# Patient Record
Sex: FEMALE | Race: BLACK OR AFRICAN AMERICAN | NOT HISPANIC OR LATINO | Employment: UNEMPLOYED | URBAN - METROPOLITAN AREA
[De-identification: names, ages, dates, MRNs, and addresses within clinical notes are randomized per-mention and may not be internally consistent; named-entity substitution may affect disease eponyms.]

---

## 2018-06-23 ENCOUNTER — HOSPITAL ENCOUNTER (EMERGENCY)
Facility: HOSPITAL | Age: 33
Discharge: HOME | End: 2018-06-23
Payer: MEDICAID

## 2018-06-23 VITALS
OXYGEN SATURATION: 99 % | BODY MASS INDEX: 31.29 KG/M2 | RESPIRATION RATE: 18 BRPM | WEIGHT: 176.56 LBS | HEART RATE: 77 BPM | DIASTOLIC BLOOD PRESSURE: 87 MMHG | HEIGHT: 63 IN | TEMPERATURE: 99 F | SYSTOLIC BLOOD PRESSURE: 120 MMHG

## 2018-06-23 DIAGNOSIS — N76.4 ABSCESS OF LABIA MAJORA: Primary | ICD-10-CM

## 2018-06-23 DIAGNOSIS — N94.89 LABIAL PAIN: ICD-10-CM

## 2018-06-23 PROCEDURE — 25000003 PHARM REV CODE 250: Performed by: REGISTERED NURSE

## 2018-06-23 PROCEDURE — 56405 I&D VULVA/PERINEAL ABSCESS: CPT

## 2018-06-23 PROCEDURE — 99284 EMERGENCY DEPT VISIT MOD MDM: CPT | Mod: 25

## 2018-06-23 RX ORDER — TRAMADOL HYDROCHLORIDE 50 MG/1
50 TABLET ORAL EVERY 6 HOURS PRN
Qty: 12 TABLET | Refills: 0 | Status: SHIPPED | OUTPATIENT
Start: 2018-06-23 | End: 2018-07-03

## 2018-06-23 RX ORDER — LIDOCAINE HYDROCHLORIDE 10 MG/ML
10 INJECTION, SOLUTION EPIDURAL; INFILTRATION; INTRACAUDAL; PERINEURAL
Status: COMPLETED | OUTPATIENT
Start: 2018-06-23 | End: 2018-06-23

## 2018-06-23 RX ORDER — CLINDAMYCIN HYDROCHLORIDE 300 MG/1
300 CAPSULE ORAL EVERY 6 HOURS
Qty: 28 CAPSULE | Refills: 0 | Status: SHIPPED | OUTPATIENT
Start: 2018-06-23 | End: 2018-06-30

## 2018-06-23 RX ADMIN — LIDOCAINE HYDROCHLORIDE 100 MG: 10 INJECTION, SOLUTION EPIDURAL; INFILTRATION; INTRACAUDAL; PERINEURAL at 08:06

## 2018-06-24 NOTE — ED PROVIDER NOTES
SCRIBE #1 NOTE: I, Manuel Bose, am scribing for, and in the presence of, Cory Grady Jr, NP. I have scribed the entire note.      History      Chief Complaint   Patient presents with    Abscess     vaginal abscess reported       Review of patient's allergies indicates:  No Known Allergies     HPI   HPI    6/23/2018, 8:08 PM   History obtained from the patient      History of Present Illness: Izzy Horner is a 32 y.o. female patient who presents to the Emergency Department for an evaluation of an abscess to her labia which onset gradually x2 days ago. Pt reports she has had abscesses in the past after being waxed. Symptoms are constant and moderate in severity. Exacerbated by palpation and relieved by nothing. No associated sxs. Patient denies any fever, chills, redness, swelling, warmth, drainage, and all other sxs at this time. Pt denies tx PTA. No further complaints or concerns at this time.         Arrival mode: Personal vehicle    PCP: Vincent Esquivel MD       Past Medical History:  Past Medical History:   Diagnosis Date    Encounter for blood transfusion        Past Surgical History:  Past surgical history reviewed not relevant      Family History:  Family history reviewed not relevant      Social History:  Social History     Social History Main Topics    Smoking status: Never Smoker    Smokeless tobacco: Never Used    Alcohol use No    Drug use: No    Sexual activity: Yes     Partners: Male     Birth control/ protection: None       ROS   Review of Systems   Constitutional: Negative for chills and fever.   HENT: Negative for congestion and sore throat.    Respiratory: Negative for cough and shortness of breath.    Cardiovascular: Negative for chest pain.   Gastrointestinal: Negative for nausea and vomiting.   Genitourinary: Negative for dysuria and hematuria.   Musculoskeletal: Negative for back pain.   Skin: Negative for pallor and rash.        (+) Abscess  (-) Warmth  (-)  Swelling  (-) Redness  (-) Drainage   Neurological: Negative for dizziness, weakness and headaches.   Hematological: Does not bruise/bleed easily.     Physical Exam      Initial Vitals [06/23/18 2002]   BP Pulse Resp Temp SpO2   129/86 66 18 98.3 °F (36.8 °C) 100 %      MAP       --          Physical Exam  Nursing Notes and Vital Signs Reviewed.  Constitutional: Patient is in no acute distress. Well-developed and well-nourished.  Head: Atraumatic. Normocephalic.  Eyes: PERRL. EOM intact. Conjunctivae are not pale. No scleral icterus.  ENT: Mucous membranes are moist. Oropharynx is clear and symmetric.    Neck: Supple. Full ROM. No lymphadenopathy.  Cardiovascular: Regular rate. Regular rhythm. No murmurs, rubs, or gallops. Distal pulses are 2+ and symmetric.  Pulmonary/Chest: No respiratory distress. Clear to auscultation bilaterally. No wheezing or rales.  Abdominal: Soft and non-distended.  There is no tenderness.   Pelvic: A female chaperone was present for this examination. Nl external inspection. x2cm area of erythema and fluctuance noted to the right labia majora, tender to touch  Musculoskeletal: Moves all extremities. No obvious deformities.  Skin: Warm and dry.  Neurological:  Alert, awake, and appropriate.  Normal speech.  No acute focal neurological deficits are appreciated.  Psychiatric: Normal affect. Good eye contact. Appropriate in content.    ED Course    I & D - Incision and Drainage  Date/Time: 6/23/2018 8:31 PM  Performed by: CARISSA PAZ JR  Authorized by: CARISSA PAZ JR   Type: abscess  Body area: anogenital (Right lateral aspect of labia majora)  Anesthesia: local infiltration    Anesthesia:  Local Anesthetic: lidocaine 1% with epinephrine  Patient sedated: no  Scalpel size: 11  Incision type: single straight  Complexity: simple  Drainage: pus  Drainage amount: moderate  Wound treatment: incision,  wound left open,  deloculation,  expression of material and  wound  "packed  Packing material: 1/4 in gauze  Complications: No  Patient tolerance: Patient tolerated the procedure well with no immediate complications  Comments: A maxi pad was placed over site as a dressing.         ED Vital Signs:  Vitals:    06/23/18 2002   BP: 129/86   Pulse: 66   Resp: 18   Temp: 98.3 °F (36.8 °C)   TempSrc: Oral   SpO2: 100%   Weight: 80.1 kg (176 lb 9.4 oz)   Height: 5' 3" (1.6 m)            The Emergency Provider reviewed the vital signs and test results, which are outlined above.    ED Discussion     8:40 PM: Reassessed pt at this time. Pt is awake, alert, and in NAD. Pt states her condition has improved at this time. Discussed with pt all pertinent ED information. Discussed pt dx and plan of tx. Gave pt all f/u and return to the ED instructions. All questions and concerns were addressed at this time. Pt expresses understanding of information and instructions, and is comfortable with plan to discharge. Pt is stable for discharge.    I discussed with patient and/or family/caretaker that evaluation in the ED does not suggest any emergent or life threatening medical conditions requiring immediate intervention beyond what was provided in the ED, and I believe patient is safe for discharge.  Regardless, an unremarkable evaluation in the ED does not preclude the development or presence of a serious of life threatening condition. As such, patient was instructed to return immediately for any worsening or change in current symptoms.    I discussed wound care precautions with patient and/or family/caretaker; specifically that all wounds have risk of infection despite efforts to cleanse and debride the wound; and there is a risk of an occult foreign body (and thus increased risk of infection) despite a negative examination.  I discussed with patient need to return for any signs of infection, specifically redness, increased pain, fever, drainage of pus, or any concern, immediately.      ED " Medication(s):  Medications   lidocaine (PF) 10 mg/ml (1%) injection 100 mg (not administered)       New Prescriptions    CLINDAMYCIN (CLEOCIN) 300 MG CAPSULE    Take 1 capsule (300 mg total) by mouth every 6 (six) hours. for 7 days    TRAMADOL (ULTRAM) 50 MG TABLET    Take 1 tablet (50 mg total) by mouth every 6 (six) hours as needed.       Follow-up Information     Vincent Esquivel MD In 1 week.    Specialty:  Family Medicine  Why:  As needed  Contact information:  [address] [facility] [id number] [phone]             [facility] - .    Specialty:  Emergency Medicine  Why:  If symptoms worsen  Contact information:  [address] [facility] [address] [phone]                   Medical Decision Making              Scribe Attestation:   Scribe #1: I performed the above scribed service and the documentation accurately describes the services I performed. I attest to the accuracy of the note.    Attending:   Physician Attestation Statement for Scribe #1: I, Cory Grady Jr, NP, personally performed the services described in this documentation, as scribed by Manuel Bose, in my presence, and it is both accurate and complete.          Clinical Impression       ICD-10-CM ICD-9-CM   1. Abscess of labia majora N76.4 616.4   2. Labial pain N94.89 625.9       Disposition:   Disposition: Discharged  Condition: Stable         Cory Grady Jr., [facility]  06/23/18 7359

## 2018-10-22 ENCOUNTER — HOSPITAL ENCOUNTER (EMERGENCY)
Facility: HOSPITAL | Age: 33
Discharge: HOME | End: 2018-10-22
Attending: EMERGENCY MEDICINE
Payer: MEDICAID

## 2018-10-22 VITALS
RESPIRATION RATE: 18 BRPM | TEMPERATURE: 98 F | SYSTOLIC BLOOD PRESSURE: 130 MMHG | HEART RATE: 72 BPM | WEIGHT: 177.38 LBS | OXYGEN SATURATION: 99 % | HEIGHT: 63 IN | BODY MASS INDEX: 31.43 KG/M2 | DIASTOLIC BLOOD PRESSURE: 78 MMHG

## 2018-10-22 DIAGNOSIS — L03.115 CELLULITIS OF RIGHT LOWER EXTREMITY: Primary | ICD-10-CM

## 2018-10-22 PROCEDURE — 99284 EMERGENCY DEPT VISIT MOD MDM: CPT | Mod: 25

## 2018-10-22 PROCEDURE — 25000003 PHARM REV CODE 250: Performed by: EMERGENCY MEDICINE

## 2018-10-22 PROCEDURE — 96372 THER/PROPH/DIAG INJ SC/IM: CPT

## 2018-10-22 PROCEDURE — S0077 INJECTION, CLINDAMYCIN PHOSP: HCPCS | Performed by: EMERGENCY MEDICINE

## 2018-10-22 RX ORDER — SULFAMETHOXAZOLE AND TRIMETHOPRIM 800; 160 MG/1; MG/1
2 TABLET ORAL 2 TIMES DAILY
Qty: 40 TABLET | Refills: 0 | Status: SHIPPED | OUTPATIENT
Start: 2018-10-22 | End: 2018-11-01

## 2018-10-22 RX ORDER — CLINDAMYCIN PHOSPHATE 150 MG/ML
600 INJECTION, SOLUTION INTRAVENOUS
Status: COMPLETED | OUTPATIENT
Start: 2018-10-22 | End: 2018-10-22

## 2018-10-22 RX ORDER — CLINDAMYCIN HYDROCHLORIDE 150 MG/1
450 CAPSULE ORAL EVERY 8 HOURS
Qty: 90 CAPSULE | Refills: 0 | Status: SHIPPED | OUTPATIENT
Start: 2018-10-22 | End: 2018-11-01

## 2018-10-22 RX ADMIN — CLINDAMYCIN PHOSPHATE 600 MG: 150 INJECTION, SOLUTION INTRAMUSCULAR; INTRAVENOUS at 09:10

## 2018-10-23 NOTE — ED PROVIDER NOTES
SCRIBE #1 NOTE: I, Grace Keira, am scribing for, and in the presence of, Thor Flores MD. I have scribed the entire note.      History      Chief Complaint   Patient presents with    Insect Bite     insect bite to R ankle with redness and swelling; pt states she was seen at after hours and taking antibiotics with no change for 2 days       Review of patient's allergies indicates:  No Known Allergies     HPI   HPI    10/22/2018, 8:41 PM   History obtained from the patient      History of Present Illness: Izzy Horner is a 33 y.o. female patient who presents to the Emergency Department for erythema and swelling to the R ankle which onset gradually after an insect bite x1 week ago. Pt reports the swelling and erythema began worsening x2 days ago. She was evaluated at urgent care and prescribed an abx, possibly Clindamycin 300mg TID (pt is unsure but states that sounded familiar). She reports no improvement with abx. Symptoms are constant and moderate in severity. No mitigating or exacerbating factors reported. No other associated sxs. Patient denies any fever, chills, extremity weakness/numbness, drainage, falls, trauma, and all other sxs at this time. She has also been applying warm compresses. No further complaints or concerns at this time.       Arrival mode: Personal vehicle      PCP: Vincent Esquivel MD       Past Medical History:  Past Medical History:   Diagnosis Date    Encounter for blood transfusion        Past Surgical History:  History reviewed. No pertinent surgical history.      Family History:  History reviewed. No pertinent family history.    Social History:  Social History     Tobacco Use    Smoking status: Never Smoker    Smokeless tobacco: Never Used   Substance and Sexual Activity    Alcohol use: No    Drug use: No    Sexual activity: Yes     Partners: Male     Birth control/protection: None       ROS   Review of Systems   Constitutional: Negative for chills and fever.   HENT:  Negative for sore throat.    Respiratory: Negative for shortness of breath.    Cardiovascular: Negative for chest pain.   Gastrointestinal: Negative for nausea and vomiting.   Genitourinary: Negative for dysuria.   Musculoskeletal: Positive for joint swelling (R ankle). Negative for back pain.   Skin: Positive for color change (erythema, R ankle).        (-) drainage   Neurological: Negative for weakness and numbness.   Hematological: Does not bruise/bleed easily.   All other systems reviewed and are negative.      Physical Exam      Initial Vitals [10/22/18 1951]   BP Pulse Resp Temp SpO2   124/82 64 20 98.6 °F (37 °C) 100 %      MAP       --          Physical Exam  Nursing Notes and Vital Signs Reviewed.  Constitutional: Patient is in no acute distress. Well-developed and well-nourished.  Head: Atraumatic. Normocephalic.  Eyes: PERRL. EOM intact. Conjunctivae are not pale. No scleral icterus.  ENT: Mucous membranes are moist. Oropharynx is clear and symmetric.    Neck: Supple. Full ROM. No lymphadenopathy.  Cardiovascular: Regular rate. Regular rhythm. No murmurs, rubs, or gallops. Distal pulses are 2+ and symmetric.  Pulmonary/Chest: No respiratory distress. Clear to auscultation bilaterally. No wheezing or rales.  Abdominal: Soft and non-distended.  There is no tenderness.  No rebound, guarding, or rigidity.   Musculoskeletal: Moves all extremities. No obvious deformities.No calf tenderness.  RLE: no evident deformity. 6cm round area of erythema with minimal amount of fluctuance to the center to R ankle. Able to express a small amount of purulent discharge upon exam, no significant induration. ROM is normal. Cap refill distally is <2 seconds. DP and PT pulses are equal and 2+ bilaterally. No motor deficit. No distal sensory deficit. NVI distally.   Skin: Warm and dry.  Neurological:  Alert, awake, and appropriate.  Normal speech.  No acute focal neurological deficits are appreciated.  Psychiatric: Normal affect.  "Good eye contact. Appropriate in content.    ED Course    Procedures  ED Vital Signs:  Vitals:    10/22/18 1951 10/22/18 2143   BP: 124/82 130/78   Pulse: 64 72   Resp: 20 18   Temp: 98.6 °F (37 °C) 98.2 °F (36.8 °C)   TempSrc: Oral Oral   SpO2: 100% 99%   Weight: 80.4 kg (177 lb 5.8 oz)    Height: 5' 3" (1.6 m)             The Emergency Provider reviewed the vital signs and test results, which are outlined above.    ED Discussion     9:11 PM: Initial assessment of pt.  Pt is awake, alert, and in NAD at this time. Discussed with pt all pertinent ED information. Discussed pt dx and plan of tx with Bactrim and Clindamycin. Will give first dose of clindamycin in the ED. Advised pt to return if sxs do not begin improving in 2 days. Gave pt all f/u and return to the ED instructions. All questions and concerns were addressed at this time. Pt expresses understanding of information and instructions, and is comfortable with plan to discharge. Pt is stable for discharge.    I discussed with patient and/or family/caretaker that evaluation in the ED does not suggest any emergent or life threatening medical conditions requiring immediate intervention beyond what was provided in the ED, and I believe patient is safe for discharge.  Regardless, an unremarkable evaluation in the ED does not preclude the development or presence of a serious of life threatening condition. As such, patient was instructed to return immediately for any worsening or change in current symptoms.      ED Medication(s):  Medications   clindamycin injection 600 mg (600 mg Intramuscular Given 10/22/18 2131)     Current Discharge Medication List      START taking these medications    Details   clindamycin (CLEOCIN) 150 MG capsule Take 3 capsules (450 mg total) by mouth every 8 (eight) hours. for 10 days  Qty: 90 capsule, Refills: 0      sulfamethoxazole-trimethoprim 800-160mg (BACTRIM DS) 800-160 mg Tab Take 2 tablets by mouth 2 (two) times daily. for 10 " days  Qty: 40 tablet, Refills: 0             Follow-up Information     Vincent Esquivel MD In 2 days.    Specialty:  Family Medicine  Contact information:  [address] [facility] [address]  [phone]             [facility] - .    Specialty:  Emergency Medicine  Why:  If symptoms worsen  Contact information:  [address]  [facility] [id number] [phone]                   Medical Decision Making              Scribe Attestation:   Scribe #1: I performed the above scribed service and the documentation accurately describes the services I performed. I attest to the accuracy of the note.    Attending:   Physician Attestation Statement for Scribe #1: I, Thor Flores MD, personally performed the services described in this documentation, as scribed by Grace Crockett, in my presence, and it is both accurate and complete.          Clinical Impression       ICD-10-CM ICD-9-CM   1. Cellulitis of right lower extremity L03.115 682.6       Disposition:   Disposition: Discharged  Condition: Stable         Thor Flores MD  10/30/18 9129

## 2024-08-18 ENCOUNTER — HOSPITAL ENCOUNTER (EMERGENCY)
Facility: HOSPITAL | Age: 39
Discharge: LEFT BEFORE DISCHARGE | End: 2024-08-18
Payer: COMMERCIAL

## 2024-08-18 VITALS
HEART RATE: 87 BPM | SYSTOLIC BLOOD PRESSURE: 183 MMHG | TEMPERATURE: 98 F | RESPIRATION RATE: 18 BRPM | HEIGHT: 63 IN | BODY MASS INDEX: 30.72 KG/M2 | DIASTOLIC BLOOD PRESSURE: 98 MMHG | OXYGEN SATURATION: 100 % | WEIGHT: 173.38 LBS

## 2024-08-18 PROCEDURE — 99900041 HC LEFT WITHOUT BEING SEEN- EMERGENCY

## 2024-08-20 ENCOUNTER — OFFICE VISIT (OUTPATIENT)
Dept: INTERNAL MEDICINE | Facility: CLINIC | Age: 39
End: 2024-08-20
Payer: COMMERCIAL

## 2024-08-20 VITALS
WEIGHT: 172.63 LBS | SYSTOLIC BLOOD PRESSURE: 124 MMHG | OXYGEN SATURATION: 99 % | BODY MASS INDEX: 30.59 KG/M2 | TEMPERATURE: 97 F | DIASTOLIC BLOOD PRESSURE: 82 MMHG | HEIGHT: 63 IN | HEART RATE: 86 BPM | RESPIRATION RATE: 20 BRPM

## 2024-08-20 DIAGNOSIS — Z11.4 SCREENING FOR HIV (HUMAN IMMUNODEFICIENCY VIRUS): ICD-10-CM

## 2024-08-20 DIAGNOSIS — Z12.31 ENCOUNTER FOR SCREENING MAMMOGRAM FOR MALIGNANT NEOPLASM OF BREAST: ICD-10-CM

## 2024-08-20 DIAGNOSIS — Z12.4 CERVICAL CANCER SCREENING: ICD-10-CM

## 2024-08-20 DIAGNOSIS — S09.90XA INJURY OF HEAD, INITIAL ENCOUNTER: ICD-10-CM

## 2024-08-20 DIAGNOSIS — Z00.00 ANNUAL PHYSICAL EXAM: Primary | ICD-10-CM

## 2024-08-20 DIAGNOSIS — Z11.59 NEED FOR HEPATITIS C SCREENING TEST: ICD-10-CM

## 2024-08-20 PROCEDURE — 3074F SYST BP LT 130 MM HG: CPT | Mod: CPTII,S$GLB,, | Performed by: INTERNAL MEDICINE

## 2024-08-20 PROCEDURE — 99999 PR PBB SHADOW E&M-EST. PATIENT-LVL V: CPT | Mod: PBBFAC,,, | Performed by: INTERNAL MEDICINE

## 2024-08-20 PROCEDURE — 3008F BODY MASS INDEX DOCD: CPT | Mod: CPTII,S$GLB,, | Performed by: INTERNAL MEDICINE

## 2024-08-20 PROCEDURE — 1160F RVW MEDS BY RX/DR IN RCRD: CPT | Mod: CPTII,S$GLB,, | Performed by: INTERNAL MEDICINE

## 2024-08-20 PROCEDURE — 3079F DIAST BP 80-89 MM HG: CPT | Mod: CPTII,S$GLB,, | Performed by: INTERNAL MEDICINE

## 2024-08-20 PROCEDURE — 1159F MED LIST DOCD IN RCRD: CPT | Mod: CPTII,S$GLB,, | Performed by: INTERNAL MEDICINE

## 2024-08-20 PROCEDURE — 99386 PREV VISIT NEW AGE 40-64: CPT | Mod: S$GLB,,, | Performed by: INTERNAL MEDICINE

## 2024-08-20 PROCEDURE — 3044F HG A1C LEVEL LT 7.0%: CPT | Mod: CPTII,S$GLB,, | Performed by: INTERNAL MEDICINE

## 2024-08-20 RX ORDER — IBUPROFEN 800 MG/1
800 TABLET ORAL EVERY 8 HOURS PRN
COMMUNITY
Start: 2024-07-23

## 2024-08-20 NOTE — PROGRESS NOTES
"Izzy Horner  38 y.o. Black or  female  [id number]    Chief Complaint:  Chief Complaint   Patient presents with    [facility]    Annual Exam       History of Present Illness:  History of Present Illness    CHIEF COMPLAINT:  Ms. Horner presents today to [facility], an annual exam and for evaluation of head injury sustained at work.    HISTORY OF PRESENT ILLNESS:  On July 20th, she sustained a head injury at her [employer] workplace when a coworker pushed a paper roll ("dungeon roll") with excessive force, causing the black knob to strike her head. She lost consciousness briefly and experienced immediate swelling. Since then, she has been experiencing dizzy spells, blurry vision in her left eye, and headaches. Currently, she rates her headache as mild (5/10 severity). She reports increased fatigue, intermittent numbness and tingling in her fingers, and occasional dizziness, particularly when following objects with her eyes. Her symptoms vary day to day. Initially, headaches worsened with light exposure, but this is no longer the case. She denies taking ibuprofen regularly, only using it for severe headaches.    MEDICAL CARE RECEIVED:  She visited [facility] on the day of injury for initial evaluation. Two weeks post-injury, she had a CT at [facility]. A follow-up visit on July 29th resulted in an MRI order.    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 Her employer is accommodating her condition by allowing rest during dizzy spells.    PAST MEDICAL HISTORY:  She denies any significant medical conditions or regular medications prior to this incident.    SOCIAL HISTORY:  She recently started working at [employer], with the injury occurring on her second day of work (July 20th).    GYNECOLOGICAL HISTORY:  She is due for a Pap smear but currently lacks a gynecologist. She previously saw a Chinese-speaking " gynecologist but experienced communication difficulties.    ALLERGIES:  She denies any known allergies.    FAMILY HISTORY:  She denies any relevant family history.    SUBSTANCE USE:  She denies any substance use.    SURGICAL HISTORY:  She denies any surgical history.    ROS:  General: -fever, +fatigue, -weight gain, -weight loss  Eyes: +vision changes, +blurry vision  ENT: -ear pain, -hearing loss  Cardiovascular: -chest pain, -palpitations, -lower extremity edema  Respiratory: -cough, -shortness of breath  Gastrointestinal: -abdominal pain, -diarrhea, -constipation, -blood in stool  Genitourinary: -dysuria, -hematuria, -frequency  Musculoskeletal: -joint pain, -muscle pain, -back pain  Skin: -rash, -lesion  Neurological: +headache, +dizziness, -numbness, -tingling  Psychiatric: -anxiety, -depression, -sleep difficulty       The following were reviewed: Active problem list, medication list, allergies, family history, social history, and Health Maintenance.     History:  Past Medical History:   Diagnosis Date    Anemia     Encounter for blood transfusion      History reviewed. No pertinent surgical history.  Family History   Problem Relation Name Age of Onset    Cancer Neg Hx      Diabetes Neg Hx      Heart disease Neg Hx      Hypertension Neg Hx      Kidney disease Neg Hx      Stroke Neg Hx       Social History     Socioeconomic History    Marital status: Single   Tobacco Use    Smoking status: Never    Smokeless tobacco: Never   Substance and Sexual Activity    Alcohol use: No    Drug use: No    Sexual activity: Yes     Partners: Male     Birth control/protection: None     Patient Active Problem List   Diagnosis   (none) - all problems resolved or deleted     Review of patient's allergies indicates:  No Known Allergies    Health Maintenance  Health Maintenance Topics with due status: Not Due       Topic Last Completion Date    TETANUS VACCINE 10/30/2015    Hemoglobin A1c (Diabetic Prevention Screening) 08/21/2024     Influenza Vaccine Not Due     Health Maintenance Due   Topic Date Due    Cervical Cancer Screening  Never done    Pneumococcal Vaccines (Age 0-64) (1 of 2 - PCV) Never done    Mammogram  Never done    COVID-19 Vaccine (1 - 2023-24 season) Never done       Medications:  Current Outpatient Medications on File Prior to Visit   Medication Sig Dispense Refill    ibuprofen (ADVIL,MOTRIN) 800 MG tablet Take 800 mg by mouth every 8 (eight) hours as needed.       No current facility-administered medications on file prior to visit.       Medications have been reviewed and reconciled with patient at visit today.    Exam:  Vitals:    08/20/24 1048   BP: 124/82   Pulse: 86   Resp: 20   Temp: 96.7 °F (35.9 °C)     Weight: 78.3 kg (172 lb 9.9 oz)   Body mass index is 30.58 kg/m².      Physical Exam    Vitals: Reviewed.  Constitutional: No acute distress. Well-developed. Not ill-appearing.  Eyes: No scleral icterus. PERRL, EOMI  Neck: No cervical lymphadenopathy  Cardiovascular: Normal rate and regular rhythm. Normal heart sounds.  Pulmonary: Pulmonary effort is normal. No respiratory distress. Normal breath sounds.  Abdomen: Soft. Nontender. Nondistended. Normoactive bowel sounds.  Extremities: No edema.  Skin: Warm. Dry.  Neurological: Alert and oriented to person, place, and time. Bilateral upper and lower extremity strength and sensation intact.  Psychiatric: Behavior normal.         Assessment:  The primary encounter diagnosis was Annual physical exam. Diagnoses of Injury of head, initial encounter, Need for hepatitis C screening test, Screening for HIV (human immunodeficiency virus), Encounter for screening mammogram for malignant neoplasm of breast, and Cervical cancer screening were also pertinent to this visit.    Assessment & Plan    ANNUAL PHYSICAL EXAM:  - Counseled regarding age appropriate screenings and immunizations  - Counseled regarding lifestyle modifications  HEAD INJURY:  - Assessed patient with recent head  injury, likely concussion, based on reported symptoms including headaches, dizziness, blurred vision, and increased sleepiness.  - Reviewed history of injury occurring at work approximately 3 weeks ago.  - Performed neurological exam, noting some dizziness with eye movement.  - Determined need to review previous CT results and MRI order from [facility] before considering neurology referral.  - Considered possibility of prolonged concussion symptoms.  - Evaluated patient's ability to return to work, noting current placement in wellness center with accommodations.  - Explained that concussion symptoms can persist for several weeks to months.  - Discussed importance of rest and gradual return to activities.  - Ms. Horner to continue current work arrangement in wellness center with accommodations as needed.  LABS:  - Ordered fasting labs including cholesterol and diabetes screening.  - Ordered HIV and Hepatitis C testing.  - Follow up in the morning for fasting labs.  GYNECOLOGY REFERRAL:  - Will refer patient to gynecologist for Pap smear after obtaining previous medical records.  FOLLOW UP:  - Request medical records from [facility] for CT results and MRI order.         Addendum:   Reviewed CT of Head without contrast done [facility] on 8/7/24. CT of the head is unremarkable.

## 2024-08-21 ENCOUNTER — LAB VISIT (OUTPATIENT)
Dept: LAB | Facility: HOSPITAL | Age: 39
End: 2024-08-21
Attending: INTERNAL MEDICINE
Payer: COMMERCIAL

## 2024-08-21 DIAGNOSIS — Z11.4 SCREENING FOR HIV (HUMAN IMMUNODEFICIENCY VIRUS): ICD-10-CM

## 2024-08-21 DIAGNOSIS — Z11.59 NEED FOR HEPATITIS C SCREENING TEST: ICD-10-CM

## 2024-08-21 DIAGNOSIS — Z00.00 ANNUAL PHYSICAL EXAM: ICD-10-CM

## 2024-08-21 LAB
ALBUMIN SERPL BCP-MCNC: 3.1 G/DL (ref 3.5–5.2)
ALP SERPL-CCNC: 74 U/L (ref 55–135)
ALT SERPL W/O P-5'-P-CCNC: 21 U/L (ref 10–44)
ANION GAP SERPL CALC-SCNC: 5 MMOL/L (ref 8–16)
AST SERPL-CCNC: 19 U/L (ref 10–40)
BASOPHILS # BLD AUTO: 0.04 K/UL (ref 0–0.2)
BASOPHILS NFR BLD: 0.8 % (ref 0–1.9)
BILIRUB SERPL-MCNC: 0.3 MG/DL (ref 0.1–1)
BUN SERPL-MCNC: 11 MG/DL (ref 6–20)
CALCIUM SERPL-MCNC: 9.1 MG/DL (ref 8.7–10.5)
CHLORIDE SERPL-SCNC: 108 MMOL/L (ref 95–110)
CHOLEST SERPL-MCNC: 207 MG/DL (ref 120–199)
CHOLEST/HDLC SERPL: 4.6 {RATIO} (ref 2–5)
CO2 SERPL-SCNC: 25 MMOL/L (ref 23–29)
CREAT SERPL-MCNC: 0.9 MG/DL (ref 0.5–1.4)
DIFFERENTIAL METHOD BLD: ABNORMAL
EOSINOPHIL # BLD AUTO: 0.5 K/UL (ref 0–0.5)
EOSINOPHIL NFR BLD: 9.8 % (ref 0–8)
ERYTHROCYTE [DISTWIDTH] IN BLOOD BY AUTOMATED COUNT: 16.2 % (ref 11.5–14.5)
EST. GFR  (NO RACE VARIABLE): >60 ML/MIN/1.73 M^2
ESTIMATED AVG GLUCOSE: 108 MG/DL (ref 68–131)
GLUCOSE SERPL-MCNC: 93 MG/DL (ref 70–110)
HBA1C MFR BLD: 5.4 % (ref 4–5.6)
HCT VFR BLD AUTO: 30.2 % (ref 37–48.5)
HCV AB SERPL QL IA: NORMAL
HDLC SERPL-MCNC: 45 MG/DL (ref 40–75)
HDLC SERPL: 21.7 % (ref 20–50)
HGB BLD-MCNC: 9.3 G/DL (ref 12–16)
HIV 1+2 AB+HIV1 P24 AG SERPL QL IA: NORMAL
IMM GRANULOCYTES # BLD AUTO: 0.01 K/UL (ref 0–0.04)
IMM GRANULOCYTES NFR BLD AUTO: 0.2 % (ref 0–0.5)
LDLC SERPL CALC-MCNC: 136.6 MG/DL (ref 63–159)
LYMPHOCYTES # BLD AUTO: 1.6 K/UL (ref 1–4.8)
LYMPHOCYTES NFR BLD: 32.6 % (ref 18–48)
MCH RBC QN AUTO: 23.9 PG (ref 27–31)
MCHC RBC AUTO-ENTMCNC: 30.8 G/DL (ref 32–36)
MCV RBC AUTO: 78 FL (ref 82–98)
MONOCYTES # BLD AUTO: 0.7 K/UL (ref 0.3–1)
MONOCYTES NFR BLD: 13.2 % (ref 4–15)
NEUTROPHILS # BLD AUTO: 2.1 K/UL (ref 1.8–7.7)
NEUTROPHILS NFR BLD: 43.4 % (ref 38–73)
NONHDLC SERPL-MCNC: 162 MG/DL
NRBC BLD-RTO: 0 /100 WBC
PLATELET # BLD AUTO: 469 K/UL (ref 150–450)
PMV BLD AUTO: 10.4 FL (ref 9.2–12.9)
POTASSIUM SERPL-SCNC: 4.3 MMOL/L (ref 3.5–5.1)
PROT SERPL-MCNC: 6.1 G/DL (ref 6–8.4)
RBC # BLD AUTO: 3.89 M/UL (ref 4–5.4)
SODIUM SERPL-SCNC: 138 MMOL/L (ref 136–145)
TRIGL SERPL-MCNC: 127 MG/DL (ref 30–150)
TSH SERPL DL<=0.005 MIU/L-ACNC: 0.94 UIU/ML (ref 0.4–4)
WBC # BLD AUTO: 4.91 K/UL (ref 3.9–12.7)

## 2024-08-21 PROCEDURE — 87389 HIV-1 AG W/HIV-1&-2 AB AG IA: CPT | Performed by: INTERNAL MEDICINE

## 2024-08-21 PROCEDURE — 85025 COMPLETE CBC W/AUTO DIFF WBC: CPT | Performed by: INTERNAL MEDICINE

## 2024-08-21 PROCEDURE — 36415 COLL VENOUS BLD VENIPUNCTURE: CPT | Performed by: INTERNAL MEDICINE

## 2024-08-21 PROCEDURE — 83036 HEMOGLOBIN GLYCOSYLATED A1C: CPT | Performed by: INTERNAL MEDICINE

## 2024-08-21 PROCEDURE — 84443 ASSAY THYROID STIM HORMONE: CPT | Performed by: INTERNAL MEDICINE

## 2024-08-21 PROCEDURE — 80053 COMPREHEN METABOLIC PANEL: CPT | Performed by: INTERNAL MEDICINE

## 2024-08-21 PROCEDURE — 80061 LIPID PANEL: CPT | Performed by: INTERNAL MEDICINE

## 2024-08-21 PROCEDURE — 86803 HEPATITIS C AB TEST: CPT | Performed by: INTERNAL MEDICINE

## 2024-08-28 ENCOUNTER — TELEPHONE (OUTPATIENT)
Dept: INTERNAL MEDICINE | Facility: CLINIC | Age: 39
End: 2024-08-28
Payer: COMMERCIAL

## 2024-08-28 DIAGNOSIS — D50.9 MICROCYTIC ANEMIA: Primary | ICD-10-CM

## 2024-08-28 NOTE — PROGRESS NOTES
Discussed with patient and patient verbalized understanding she will come in tomorrow to do additional test

## 2024-08-29 ENCOUNTER — E-VISIT (OUTPATIENT)
Dept: INTERNAL MEDICINE | Facility: CLINIC | Age: 39
End: 2024-08-29
Payer: COMMERCIAL

## 2024-08-29 ENCOUNTER — LAB VISIT (OUTPATIENT)
Dept: LAB | Facility: HOSPITAL | Age: 39
End: 2024-08-29
Attending: INTERNAL MEDICINE
Payer: COMMERCIAL

## 2024-08-29 DIAGNOSIS — R51.9 RECURRENT HEADACHE: Primary | ICD-10-CM

## 2024-08-29 DIAGNOSIS — D50.9 MICROCYTIC ANEMIA: ICD-10-CM

## 2024-08-29 DIAGNOSIS — J06.9 VIRAL URI WITH COUGH: ICD-10-CM

## 2024-08-29 LAB
FERRITIN SERPL-MCNC: 14 NG/ML (ref 20–300)
IRON SERPL-MCNC: 18 UG/DL (ref 30–160)
SATURATED IRON: 4 % (ref 20–50)
TOTAL IRON BINDING CAPACITY: 451 UG/DL (ref 250–450)
TRANSFERRIN SERPL-MCNC: 305 MG/DL (ref 200–375)

## 2024-08-29 PROCEDURE — 83540 ASSAY OF IRON: CPT | Performed by: INTERNAL MEDICINE

## 2024-08-29 PROCEDURE — 36415 COLL VENOUS BLD VENIPUNCTURE: CPT | Performed by: INTERNAL MEDICINE

## 2024-08-29 PROCEDURE — 82728 ASSAY OF FERRITIN: CPT | Performed by: INTERNAL MEDICINE

## 2024-08-29 NOTE — PROGRESS NOTES
Patient ID: Izzy Horner is a 38 y.o. female.    Chief Complaint: General Illness (Entered automatically based on patient selection in [employer].)    The patient initiated a request through [employer] on 8/29/2024 for evaluation and management with a chief complaint of General Illness (Entered automatically based on patient selection in [employer].)     I evaluated the questionnaire submission on 8/29/2024.    [facility] Peq Evisit Supergroup-Cough And Cold    8/29/2024 11:19 AM CDT - Filed by Patient   What do you need help with? Other Concern   Do you agree to participate in an E-Visit? Yes   If you have any of the following symptoms, please present to your local emergency room or call 911:  I acknowledge   Are you pregnant, could you be pregnant, or are you breast feeding? None of the above   What is the main issue you would like addressed today? Nose,sore throat , headache   Please describe your symptoms Coughing ,running nose really bad headache the light nakes it hurt worst   Where is your problem located? Nose chest head   How severe are your symptoms? Moderate   Have you had these symptoms before? Yes   How long have you been having these symptoms? For a few days   Please list any medications or treatments you have used for your condition and indicate if it was effective or not. None   What makes this feel better? Nothing   What makes this feel worse? The light makes my eyes and head hurt worst   Are these symptoms related to a condition that you currently have? I am not sure   What is the condition?    When were you last seen for this condition? 8/20/2024   Please describe any probable cause for these symptoms My headache probably coming from a head injury that happen at work a month ago ... if its not a problem can i please have an work excuse for tiday   Provide any additional information you feel is important.    Please attach any relevant images or files    Are you able to take your vital signs? No          Encounter Diagnoses   Name Primary?    Recurrent headache Yes    Viral URI with cough         No orders of the defined types were placed in this encounter.     Medications Ordered This Encounter   Medications    promethazine-dextromethorphan (PROMETHAZINE-DM) 6.25-15 mg/5 mL Syrp     Sig: Take 5 mLs by mouth every 8 (eight) hours as needed.     Dispense:  118 mL     Refill:  0        No follow-ups on file.      E-Visit Time Tracking:    Day 1 Time (in minutes): 5  Day 2 Time (in minutes): 3    Total Time (in minutes): 8

## 2024-08-29 NOTE — LETTER
August 29, 2024    zIzy Horner  [address]  [place] [address]             [facility]  Internal Medicine  [address] [place] [address] [address] [place] [address]  Phone: [phone]  Fax: [phone]   August 29, 2024     Patient: Izzy Horner   YOB: 1985   Date of Visit: 8/29/2024       To Whom it May Concern:    Izzy Horner was seen in my clinic on 8/29/2024. She may return to work on 8/30/2024 .    Please excuse her from any classes or work missed.    If you have any questions or concerns, please don't hesitate to call.    Sincerely,         Valentine Payne, DO

## 2024-08-30 RX ORDER — PROMETHAZINE HYDROCHLORIDE AND DEXTROMETHORPHAN HYDROBROMIDE 6.25; 15 MG/5ML; MG/5ML
5 SYRUP ORAL EVERY 8 HOURS PRN
Qty: 118 ML | Refills: 0 | Status: SHIPPED | OUTPATIENT
Start: 2024-08-30 | End: 2024-09-09

## 2024-09-05 ENCOUNTER — E-VISIT (OUTPATIENT)
Dept: FAMILY MEDICINE | Facility: CLINIC | Age: 39
End: 2024-09-05
Payer: COMMERCIAL

## 2024-09-05 DIAGNOSIS — G43.001 MIGRAINE WITHOUT AURA AND WITH STATUS MIGRAINOSUS, NOT INTRACTABLE: Primary | ICD-10-CM

## 2024-09-05 RX ORDER — RIZATRIPTAN BENZOATE 10 MG/1
10 TABLET ORAL DAILY PRN
Qty: 4 TABLET | Refills: 0 | Status: SHIPPED | OUTPATIENT
Start: 2024-09-05 | End: 2024-10-05
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
## 2024-09-07 ENCOUNTER — TELEPHONE (OUTPATIENT)
Dept: INTERNAL MEDICINE | Facility: CLINIC | Age: 39
End: 2024-09-07
Payer: COMMERCIAL

## 2024-09-07 DIAGNOSIS — D50.9 IRON DEFICIENCY ANEMIA, UNSPECIFIED IRON DEFICIENCY ANEMIA TYPE: Primary | ICD-10-CM

## 2024-09-07 RX ORDER — FERROUS SULFATE 325(65) MG
325 TABLET, DELAYED RELEASE (ENTERIC COATED) ORAL DAILY
Qty: 30 TABLET | Refills: 5 | Status: SHIPPED | OUTPATIENT
Start: 2024-09-07

## 2024-09-12 ENCOUNTER — E-VISIT (OUTPATIENT)
Dept: INTERNAL MEDICINE | Facility: CLINIC | Age: 39
End: 2024-09-12
Payer: COMMERCIAL

## 2024-09-12 DIAGNOSIS — R51.9 PERSISTENT HEADACHES: Primary | ICD-10-CM

## 2024-09-18 ENCOUNTER — OFFICE VISIT (OUTPATIENT)
Dept: INTERNAL MEDICINE | Facility: CLINIC | Age: 39
End: 2024-09-18
Payer: COMMERCIAL

## 2024-09-18 VITALS
DIASTOLIC BLOOD PRESSURE: 82 MMHG | BODY MASS INDEX: 30.93 KG/M2 | WEIGHT: 174.63 LBS | RESPIRATION RATE: 20 BRPM | SYSTOLIC BLOOD PRESSURE: 126 MMHG | HEART RATE: 90 BPM | TEMPERATURE: 97 F | OXYGEN SATURATION: 99 %

## 2024-09-18 DIAGNOSIS — S09.90XD INJURY OF HEAD, SUBSEQUENT ENCOUNTER: Primary | ICD-10-CM

## 2024-09-18 DIAGNOSIS — R51.9 NONINTRACTABLE HEADACHE, UNSPECIFIED CHRONICITY PATTERN, UNSPECIFIED HEADACHE TYPE: ICD-10-CM

## 2024-09-18 DIAGNOSIS — G43.001 MIGRAINE WITHOUT AURA AND WITH STATUS MIGRAINOSUS, NOT INTRACTABLE: ICD-10-CM

## 2024-09-18 PROCEDURE — G2211 COMPLEX E/M VISIT ADD ON: HCPCS | Mod: S$GLB,,, | Performed by: PHYSICIAN ASSISTANT

## 2024-09-18 PROCEDURE — 3079F DIAST BP 80-89 MM HG: CPT | Mod: CPTII,S$GLB,, | Performed by: PHYSICIAN ASSISTANT

## 2024-09-18 PROCEDURE — 3074F SYST BP LT 130 MM HG: CPT | Mod: CPTII,S$GLB,, | Performed by: PHYSICIAN ASSISTANT

## 2024-09-18 PROCEDURE — 99214 OFFICE O/P EST MOD 30 MIN: CPT | Mod: S$GLB,,, | Performed by: PHYSICIAN ASSISTANT

## 2024-09-18 PROCEDURE — 3044F HG A1C LEVEL LT 7.0%: CPT | Mod: CPTII,S$GLB,, | Performed by: PHYSICIAN ASSISTANT

## 2024-09-18 PROCEDURE — 3008F BODY MASS INDEX DOCD: CPT | Mod: CPTII,S$GLB,, | Performed by: PHYSICIAN ASSISTANT

## 2024-09-18 PROCEDURE — 99999 PR PBB SHADOW E&M-EST. PATIENT-LVL IV: CPT | Mod: PBBFAC,,, | Performed by: PHYSICIAN ASSISTANT

## 2024-09-18 RX ORDER — RIZATRIPTAN BENZOATE 10 MG/1
10 TABLET ORAL DAILY PRN
Qty: 10 TABLET | Refills: 0 | Status: SHIPPED | OUTPATIENT
Start: 2024-09-18 | End: 2024-10-18

## 2024-09-18 NOTE — LETTER
September 19, 2024      [facility]  [address] [place] [address] [place] [address]  Phone: [phone]  Fax: [phone]       Patient: Izzy Horner   YOB: 1985  Date of Visit: 09/19/2024    To Whom It May Concern:    Hali Horner  was at [facility] on 09/19/2024. The patient may return to work/school on 9/19/24 with restrictions (no climbing of stairs or ladders, no operating heavy equipment). Restrictions in place until patient sees specialist 11/4/24, any further recommendations to come from specialist at that time.  If you have any questions or concerns, or if I can be of further assistance, please do not hesitate to contact me.    Sincerely,    Patria Bhakta PA-C
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
## 2024-09-19 ENCOUNTER — PATIENT MESSAGE (OUTPATIENT)
Dept: INTERNAL MEDICINE | Facility: CLINIC | Age: 39
End: 2024-09-19

## 2024-09-19 PROCEDURE — 99499 UNLISTED E&M SERVICE: CPT | Mod: ,,, | Performed by: INTERNAL MEDICINE
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
## 2024-09-30 ENCOUNTER — HOSPITAL ENCOUNTER (OUTPATIENT)
Dept: RADIOLOGY | Facility: HOSPITAL | Age: 39
Discharge: HOME | End: 2024-09-30
Attending: INTERNAL MEDICINE
Payer: COMMERCIAL

## 2024-09-30 DIAGNOSIS — Z12.31 ENCOUNTER FOR SCREENING MAMMOGRAM FOR MALIGNANT NEOPLASM OF BREAST: ICD-10-CM

## 2024-09-30 PROCEDURE — 77067 SCR MAMMO BI INCL CAD: CPT | Mod: 26,,, | Performed by: RADIOLOGY

## 2024-09-30 PROCEDURE — 77067 SCR MAMMO BI INCL CAD: CPT | Mod: TC

## 2024-09-30 PROCEDURE — 77063 BREAST TOMOSYNTHESIS BI: CPT | Mod: 26,,, | Performed by: RADIOLOGY

## 2024-10-01 ENCOUNTER — TELEPHONE (OUTPATIENT)
Dept: RADIOLOGY | Facility: HOSPITAL | Age: 39
End: 2024-10-01
Payer: COMMERCIAL

## 2024-10-08 ENCOUNTER — HOSPITAL ENCOUNTER (OUTPATIENT)
Dept: RADIOLOGY | Facility: HOSPITAL | Age: 39
Discharge: HOME | End: 2024-10-08
Attending: INTERNAL MEDICINE
Payer: COMMERCIAL

## 2024-10-08 ENCOUNTER — TELEPHONE (OUTPATIENT)
Dept: RADIOLOGY | Facility: HOSPITAL | Age: 39
End: 2024-10-08
Payer: COMMERCIAL

## 2024-10-08 DIAGNOSIS — R92.8 ABNORMAL MAMMOGRAM: ICD-10-CM

## 2024-10-08 DIAGNOSIS — R92.8 ABNORMAL MAMMOGRAM: Primary | ICD-10-CM

## 2024-10-08 PROCEDURE — 77061 BREAST TOMOSYNTHESIS UNI: CPT | Mod: 26,RT,, | Performed by: RADIOLOGY

## 2024-10-08 PROCEDURE — 77065 DX MAMMO INCL CAD UNI: CPT | Mod: 26,RT,, | Performed by: RADIOLOGY

## 2024-10-08 PROCEDURE — 76642 ULTRASOUND BREAST LIMITED: CPT | Mod: TC,RT

## 2024-10-08 PROCEDURE — 77065 DX MAMMO INCL CAD UNI: CPT | Mod: TC,RT

## 2024-10-08 PROCEDURE — 77061 BREAST TOMOSYNTHESIS UNI: CPT | Mod: TC,RT

## 2024-10-08 PROCEDURE — 76642 ULTRASOUND BREAST LIMITED: CPT | Mod: 26,RT,, | Performed by: RADIOLOGY

## 2024-10-08 NOTE — TELEPHONE ENCOUNTER
Ultrasound guided biopsy scheduled for 10/10/24 at 8:30, arrival time 8am.  Biopsy instructions given with understandings verbalized. Patient has my contact information.

## 2024-10-10 ENCOUNTER — HOSPITAL ENCOUNTER (OUTPATIENT)
Dept: RADIOLOGY | Facility: HOSPITAL | Age: 39
Discharge: HOME | End: 2024-10-10
Attending: INTERNAL MEDICINE
Payer: COMMERCIAL

## 2024-10-10 DIAGNOSIS — R92.8 ABNORMAL MAMMOGRAM: ICD-10-CM

## 2024-10-10 PROCEDURE — 19083 BX BREAST 1ST LESION US IMAG: CPT | Mod: RT,,, | Performed by: RADIOLOGY

## 2024-10-10 PROCEDURE — 27200940 US BREAST BIOPSY WITH IMAGING 1ST SITE RIGHT

## 2024-10-10 PROCEDURE — 77065 DX MAMMO INCL CAD UNI: CPT | Mod: TC,RT

## 2024-10-10 PROCEDURE — A4648 IMPLANTABLE TISSUE MARKER: HCPCS

## 2024-10-10 PROCEDURE — 19083 BX BREAST 1ST LESION US IMAG: CPT | Mod: RT

## 2024-10-10 PROCEDURE — 77065 DX MAMMO INCL CAD UNI: CPT | Mod: 26,RT,, | Performed by: RADIOLOGY

## 2024-10-16 ENCOUNTER — TELEPHONE (OUTPATIENT)
Dept: SURGICAL ONCOLOGY | Facility: CLINIC | Age: 39
End: 2024-10-16
Payer: COMMERCIAL

## 2024-10-16 NOTE — TELEPHONE ENCOUNTER
Called patient to discuss benign breast biopsy results- we reviewed the pathology report. Pt verbalized understanding of all information. Pt states everything is healing well at this time and denies any further needs.

## 2024-10-18 ENCOUNTER — E-VISIT (OUTPATIENT)
Dept: INTERNAL MEDICINE | Facility: CLINIC | Age: 39
End: 2024-10-18
Payer: COMMERCIAL

## 2024-10-18 DIAGNOSIS — R51.9 PERSISTENT HEADACHES: Primary | ICD-10-CM

## 2024-10-18 PROCEDURE — 99421 OL DIG E/M SVC 5-10 MIN: CPT | Mod: ,,, | Performed by: INTERNAL MEDICINE
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
## 2024-10-31 NOTE — H&P (VIEW-ONLY)
"Subjective:       Patient ID: Izzy Horner is a 39 y.o. female.      Chief Complaint: "Headaches".        HPI    HPI 39 Years old Female with PMHx of anemia  and other medical conditions came for the evaluation and recommendation of "Headaches".      Referral: The patient was referred by Urgent Care and is accompanied by  and Son.    Headache History:    Onset: The patient began experiencing "Headaches" in June 2024 following a head injury sustained at work, where a knob from a paper roll fell off the glider and struck her left supraorbital region per patient. She reports that after the impact, she stumbled to a chair, experiencing dizziness and briefly losing consciousness for a few seconds; however, she managed to sit in the chair. No seizures at the time of head injury. No open wounds or skull fracture at the time of the head injury. No loss of vision or hearing at the time of head injury. No paralysis or weakness at the time of head injury. No vomiting at the time of head injury. The patient was evaluated at [facility], where X-rays and a head CT scan were performed, both of which returned normal results according to her report. Following the incident, she noted residual dizziness and headache pain. One week later, she developed bilateral floaters, bilateral tinnitus, and brain fog per patient.     Description: Headaches are pressure, aching, stabbing, and throbbing-like, building up slowly towards the night and early morning. Headaches do wake them up from sleep. They are progressively worsening and interfering with daily activities.    Timing:  Duration of 3 hours.    Frequency: Intermittent, headaches reported 3 times per week, and 5 per month are migraine-like.    Pain Severity: Increasing in severity, rated 8-10/10, causing significant morbidity.    Location: Bilateral frontal and temporal areas / Usually starts behind left supraorbital region and radiates to left generalized lobes " "ending to occipital area.    Family History: No family history of early dementia or Migraines.     Medications: Advil 800 mg Q8H PRN - not helping     Worsening Factors: None / Denies physical and emotional stressors. No specific food triggers identified.    Alleviating Factors: Dark and quiet room     Associated Symptoms: Bilateral eye pressure, light and sound sensitivity, dizziness she means "light-headed", nausea, does report scalp sensitivity to left occipital area, Neck Pain with radiation to the left upper extremity, bilateral hand numbness / tingling, bilateral blurry vision, double vision, and floaters, bilateral ear ringing, spells of loss of awareness where she stares into space since head injury, brain fog.     Pertinent Negative Symptoms: No other associated neurological deficits, vomiting, balance issues, acute thunderclap onset, focal or bilateral limb weakness    Positional/Behavioral Factors: Headaches are positional and postural, worsened by movement and bending the head downward. Denies worsening with Valsalva maneuver.    Triggers: Bending head in a downward position.     Prodromal Symptoms: No visual aura, irritability, or urinary frequency.    Exclusions: No TMJ issues, seizures, smoking history, caffeine overuse, vertigo, blackouts, fever, chills, or significant memory loss. No history of strokes or falls. No prior history of prior TBI. No premorbid history of anxiety-depression. No history off premorbid alcoholism. No premorbid history of migraine headaches. No premorbid history of neck or back pains.    Ophthalmological History: Last eye clinic appointment on 1 year ago, with normal pressures, no papilledema, and no abnormalities reported.     Sleep History: No symptoms of sleep apnea (e.g., snoring, gasping, frequent nighttime awakening, daytime fatigue).       Abortive therapies (tried and failed):     Advil 800 mg Q8H PRN - not helping     Rizatriptan 10 mg daily p.r.n. Patient reports " she has not started taking medication yet.  She is receptive to trialing therapy.     Preventative therapies (tried and failed): None    Pregnancy and birth control: None       Review of Systems   Constitutional:  Negative for activity change, appetite change, chills, diaphoresis, fatigue, fever and unexpected weight change.   HENT:  Positive for tinnitus. Negative for congestion, dental problem, drooling, ear discharge, ear pain, facial swelling, hearing loss, mouth sores, nosebleeds, postnasal drip, rhinorrhea, sinus pressure, sinus pain, sneezing, sore throat, trouble swallowing and voice change.    Eyes:  Positive for photophobia and visual disturbance. Negative for pain, discharge, redness and itching.        Bilateral eye pressure   Respiratory:  Negative for cough, chest tightness, shortness of breath and wheezing.    Cardiovascular:  Negative for chest pain, palpitations and leg swelling.   Gastrointestinal:  Positive for nausea. Negative for abdominal distention, abdominal pain, blood in stool, constipation, diarrhea and vomiting.   Endocrine: Negative for cold intolerance, heat intolerance, polydipsia, polyphagia and polyuria.   Genitourinary:  Negative for decreased urine volume, difficulty urinating, dysuria, flank pain, frequency, hematuria, pelvic pain, urgency and vaginal discharge.   Musculoskeletal:  Positive for neck pain. Negative for arthralgias, back pain, gait problem, joint swelling, myalgias and neck stiffness.        Neck Pain with radiation to the left upper extremity   Skin:  Negative for color change and rash.   Allergic/Immunologic: Negative for immunocompromised state.   Neurological:  Positive for dizziness, light-headedness, numbness and headaches. Negative for tremors, seizures, syncope, facial asymmetry, speech difficulty and weakness.        Patient does report scalp sensitivity to left occipital area    Patient spells of loss of awareness where she stares into space since head  injury   Hematological:  Negative for adenopathy. Does not bruise/bleed easily.   Psychiatric/Behavioral:  Negative for agitation, behavioral problems, confusion, decreased concentration, dysphoric mood, hallucinations, self-injury, sleep disturbance and suicidal ideas. The patient is not nervous/anxious and is not hyperactive.    All other systems reviewed and are negative.                Current Outpatient Medications:     ferrous sulfate 325 (65 FE) MG EC tablet, Take 1 tablet (325 mg total) by mouth once daily. (Patient not taking: Reported on 9/18/2024), Disp: 30 tablet, Rfl: 5    ibuprofen (ADVIL,MOTRIN) 800 MG tablet, Take 800 mg by mouth every 8 (eight) hours as needed., Disp: , Rfl:     rizatriptan (MAXALT) 10 MG tablet, Take 1 tablet (10 mg total) by mouth daily as needed for Migraine. If symptoms persist or return, may repeat dose after 2 hours. Do not exceed more than 2 tablets in 24hr period, Disp: 10 tablet, Rfl: 0    Past Medical History:   Diagnosis Date    Anemia     Encounter for blood transfusion        No past surgical history on file.    Social History     Socioeconomic History    Marital status: Single   Tobacco Use    Smoking status: Never    Smokeless tobacco: Never   Substance and Sexual Activity    Alcohol use: No    Drug use: No    Sexual activity: Yes     Partners: Male     Birth control/protection: None         Past/Current Medical/Surgical History, Past/Current Social History, Past/Current Family History and Past/Current Medications were reviewed in detail.    Objective:           VITAL SIGNS WERE REVIEWED      GENERAL APPEARANCE:     The patient looks comfortable.    BMI    No signs of respiratory distress.    Normal breathing pattern.    No dysmorphic features    Normal eye contact.       GENERAL MEDICAL EXAM:    HEENT:  Head is atraumatic normocephalic.     FUNDUSCOPIC (OPHTHALMOSCOPIC) EXAMINATION showed no disc edema (papilledema).      NECK: No JVD. No visible lesions or goiters.      CHEST-CARDIOPULMONARY: No cyanosis. No tachypnea. Normal respiratory effort.    [id number]: No jaundice. No stomas or lesions. No visible hernias. No catheters.     SKIN, HAIR, NAILS: No pathognomonic skin rash.No neurofibromatosis. No visible lesions.No stigmata of autoimmune disease. No clubbing.    LIMBS: No varicose veins. No visible swelling.    MUSCULOSKELETAL: No visible deformities.No visible lesions.             Neurological Exam  Mental Status  Awake, alert and oriented to person, place and time. Oriented to person, place, time and situation. Recent and remote memory are intact. At 5 minutes recalls 3 of 3 objects. Speech is normal. Language is fluent with no aphasia. Attention and concentration are normal. Fund of knowledge is appropriate for level of education. Apraxia absent.    Cranial Nerves  CN I: Sense of smell is normal.  CN II: Visual acuity is normal. Visual fields full to confrontation. Right funduscopic exam: disc intact. Left funduscopic exam: disc intact.  CN III, IV, VI: Extraocular movements intact bilaterally. Normal lids and orbits bilaterally. Pupils equal round and reactive to light bilaterally.  CN V: Facial sensation is normal.  CN VII: Full and symmetric facial movement.  CN VIII: Hearing is normal.  CN IX, X: Palate elevates symmetrically. Normal gag reflex.  CN XI: Shoulder shrug strength is normal.  CN XII: Tongue midline without atrophy or fasciculations.    Motor  Normal muscle bulk throughout. No fasciculations present. Normal muscle tone. No abnormal involuntary movements. Strength is 5/5 throughout all four extremities.    Sensory  Sensation is intact to light touch, pinprick, vibration and proprioception in all four extremities.    Reflexes                                            Right                      Left  Brachioradialis                    2+                         2+  Biceps                                 2+                          2+  Triceps                                2+                         2+  Finger flex                           2+                         2+  Hamstring                            2+                         2+  Patellar                                2+                         2+  Achilles                                2+                         2+    Coordination  Right: Finger-to-nose normal. Rapid alternating movement normal. Heel-to-shin normal.Left: Finger-to-nose normal. Rapid alternating movement normal. Heel-to-shin normal.    Gait  Casual gait is normal including stance, stride, and arm swing.Normal toe walking. Normal heel walking. Normal tandem gait. Romberg is absent. Normal pull test. Able to rise from chair without using arms.        Lab Results   Component Value Date    WBC 4.91 08/21/2024    HGB 9.3 (L) 08/21/2024    HCT 30.2 (L) 08/21/2024    MCV 78 (L) 08/21/2024     (H) 08/21/2024       Sodium   Date Value Ref Range Status   08/21/2024 138 136 - 145 mmol/L Final     Potassium   Date Value Ref Range Status   08/21/2024 4.3 3.5 - 5.1 mmol/L Final     Chloride   Date Value Ref Range Status   08/21/2024 108 95 - 110 mmol/L Final     CO2   Date Value Ref Range Status   08/21/2024 25 23 - 29 mmol/L Final     Glucose   Date Value Ref Range Status   08/21/2024 93 70 - 110 mg/dL Final     BUN   Date Value Ref Range Status   08/21/2024 11 6 - 20 mg/dL Final     Creatinine   Date Value Ref Range Status   08/21/2024 0.9 0.5 - 1.4 mg/dL Final     Calcium   Date Value Ref Range Status   08/21/2024 9.1 8.7 - 10.5 mg/dL Final     Total Protein   Date Value Ref Range Status   08/21/2024 6.1 6.0 - 8.4 g/dL Final     Albumin   Date Value Ref Range Status   08/21/2024 3.1 (L) 3.5 - 5.2 g/dL Final     Total Bilirubin   Date Value Ref Range Status   08/21/2024 0.3 0.1 - 1.0 mg/dL Final     Comment:     For infants and newborns, interpretation of results should be based  on gestational age, weight and in  "agreement with clinical  observations.    Premature Infant recommended reference ranges:  Up to 24 hours.............<8.0 mg/dL  Up to 48 hours............<12.0 mg/dL  3-5 days..................<15.0 mg/dL  6-29 days.................<15.0 mg/dL       Alkaline Phosphatase   Date Value Ref Range Status   08/21/2024 74 55 - 135 U/L Final     AST   Date Value Ref Range Status   08/21/2024 19 10 - 40 U/L Final     ALT   Date Value Ref Range Status   08/21/2024 21 10 - 44 U/L Final     Anion Gap   Date Value Ref Range Status   08/21/2024 5 (L) 8 - 16 mmol/L Final       No results found for: "[id number]"    Lab Results   Component Value Date    TSH 0.935 08/21/2024       No results found in the last 24 hours.    No results found in the last 24 hours.    Reviewed the neuroimaging independently       Assessment:   39 Years old Female with PMH as above came for an evaluation of "Headaches".    Nonintractable headache, unspecified chronicity pattern, unspecified headache type     Post-concussion headache     Injury of head, subsequent encounter     Spell of loss of consciousness     Double vision with both eyes open     Blurry vision, bilateral     Neck pain, bilateral     Cervical radiculopathy     Dizziness and giddiness     Nausea     Tinnitus of both ears     Other amnesia     Plan:   Patient Neurological Assessment is non-focal.  Patient's history and physical exam point to rule out seizures, postconcussion headache, migraines, vestibular migraine, BPPV, cervical stenosis.       Nonintractable headache, unspecified chronicity pattern, unspecified headache type / Post-concussion headache / Injury of head, subsequent encounter     -Order brain MRI without contrast to rule out structural abnormalities contributing to symptoms.    -Start Topamax 25 mg PO BID for headache prevention therapy and seizure prophylaxis.  Side effects discussed.  Patient verbalized understanding.  No past medical history of kidney stones or glaucoma " per patient.    -Continue rizatriptan 10 mg daily p.r.n. for headache abortive therapy as previously prescribed.  Side effects discussed.  Patient verbalized understanding.  Patient reports she has not started taking medication yet.  She is receptive to trialing therapy.    -Continue ibuprofen 800 mg p.o. every 8 hours p.r.n. for headache abortive therapy.  Side effects discussed.  Patient verbalized understanding.    -Ordered PT therapy for migraine prevention therapy / vestibular therapy / neck therapy    -Order MAGGY / ESR / CRP / HCG / FA / Homocysteine / RPR / B1 / B12 / for neurologic evaluation.    Spell of loss of consciousness     -Order routine EEG to rule out seizure activity.    -No driving policies discussed until seizures are ruled out.  Patient verbalized full understanding.    Double vision with both eyes open / Blurry vision, bilateral     -Ordered ophthalmology referral for further evaluation and recommendation of patient's visual symptoms.    Neck pain, bilateral / Cervical radiculopathy / Dizziness and giddiness     -Ordered MRI cervical spine without contrast to rule out structural abnormalities contributing to symptoms.     Nausea     -Offered patient pharmacological therapy for nausea.  She declines at this time and reports it is manageable with lifestyle modifications.    Tinnitus of both ears     -Offered ENT referral for evaluation of tinnitus, but patient declines at this time.              LABORATORY EVALUATION    Labs: (2024) TSH / CBC / CMP / A1C / HIV / Lipid Panel / Hepatitis C Ab   -personally reviewed -non-significant abnormalities except     H&H (9.3 / 30.2) Iron & TIBC (18 / 451 ) - Followed by PCP      RADIOLOGY EVALUATION     None        NEUROPHYSIOLOGY EVALUATION       PATHOLOGY EVALUATION        NEUROCOGNITIVE AND NEUROPSYCHOLOGY EVALUATION                        Concussion occurs at roughly 90 to 100 g-force, which equates to smashing your skull against a wall at 20  mph.    Cognitive and Physical Rest.    Minimize Stimulation.        If symptoms worsen, new symptoms show up or symptoms persist beyond 2 weeks will get Brain MRI (ASL)    If headache continues beyond 2 weeks start: Amitriptyline/Elavil and monitor for sedation.     If cognitive symptoms continue beyond 2 weeks start: Amantadine 100 mg BID.         RETURN TO SCHOOL/SPORT/EXCERCISE AS LONG AS SYMPTOM FREE     MOST CRITICAL IS THE FIRST 3 MONTHS AND ESPECIALLY FIRST 7-10 DAYS       SCHOOL:     Rest for 7 Days     Half Day School for 3 Days         SPORT/EXERCISE:       Rest for 10 Days     Minimal Aerobic Exercise for 3 Days    Moderate Aerobic Exercise for 3 Days    Non-contact Training for 3 Days     Intermittent Contact for 3 Days    Full Contact          MEDICAL/SURGICAL COMORBIDITIES     All relevant medical comorbidities noted and managed by primary care physician and medical care team.          HEALTHY LIFESTYLE AND PREVENTATIVE CARE    The patient to adhere to the age-appropriate health maintenance guidelines including screening tests and vaccinations. The patient to adhere to  healthy lifestyle, optimal weight, exercise, healthy diet, good sleep hygiene and avoiding drugs including smoking, alcohol and recreational drugs.    I spent a total of 75 minutes on the day of the visit.This includes face to face time and non-face to face time preparing to see the patient (eg, review of tests), obtaining and/or reviewing separately obtained history, documenting clinical information in the electronic or other health record, independently interpreting results and communicating results to the patient/family/caregiver, or care coordinator.     Please do not hesitate to contact me with any updates, questions or concerns.    No follow-ups on file.    Willie Berumen, MSN, FNP-C    General Neurology

## 2024-10-31 NOTE — PROGRESS NOTES
"Subjective:       Patient ID: Izzy Horner is a 39 y.o. female.      Chief Complaint: "Headaches".        HPI    HPI 39 Years old Female with PMHx of anemia  and other medical conditions came for the evaluation and recommendation of "Headaches".      Referral: The patient was referred by Urgent Care and is accompanied by  and Son.    Headache History:    Onset: The patient began experiencing "Headaches" in June 2024 following a head injury sustained at work, where a knob from a paper roll fell off the glider and struck her left supraorbital region per patient. She reports that after the impact, she stumbled to a chair, experiencing dizziness and briefly losing consciousness for a few seconds; however, she managed to sit in the chair. No seizures at the time of head injury. No open wounds or skull fracture at the time of the head injury. No loss of vision or hearing at the time of head injury. No paralysis or weakness at the time of head injury. No vomiting at the time of head injury. The patient was evaluated at [facility], where X-rays and a head CT scan were performed, both of which returned normal results according to her report. Following the incident, she noted residual dizziness and headache pain. One week later, she developed bilateral floaters, bilateral tinnitus, and brain fog per patient.     Description: Headaches are pressure, aching, stabbing, and throbbing-like, building up slowly towards the night and early morning. Headaches do wake them up from sleep. They are progressively worsening and interfering with daily activities.    Timing:  Duration of 3 hours.    Frequency: Intermittent, headaches reported 3 times per week, and 5 per month are migraine-like.    Pain Severity: Increasing in severity, rated 8-10/10, causing significant morbidity.    Location: Bilateral frontal and temporal areas / Usually starts behind left supraorbital region and radiates to left generalized lobes " "ending to occipital area.    Family History: No family history of early dementia or Migraines.     Medications: Advil 800 mg Q8H PRN - not helping     Worsening Factors: None / Denies physical and emotional stressors. No specific food triggers identified.    Alleviating Factors: Dark and quiet room     Associated Symptoms: Bilateral eye pressure, light and sound sensitivity, dizziness she means "light-headed", nausea, does report scalp sensitivity to left occipital area, Neck Pain with radiation to the left upper extremity, bilateral hand numbness / tingling, bilateral blurry vision, double vision, and floaters, bilateral ear ringing, spells of loss of awareness where she stares into space since head injury, brain fog.     Pertinent Negative Symptoms: No other associated neurological deficits, vomiting, balance issues, acute thunderclap onset, focal or bilateral limb weakness    Positional/Behavioral Factors: Headaches are positional and postural, worsened by movement and bending the head downward. Denies worsening with Valsalva maneuver.    Triggers: Bending head in a downward position.     Prodromal Symptoms: No visual aura, irritability, or urinary frequency.    Exclusions: No TMJ issues, seizures, smoking history, caffeine overuse, vertigo, blackouts, fever, chills, or significant memory loss. No history of strokes or falls. No prior history of prior TBI. No premorbid history of anxiety-depression. No history off premorbid alcoholism. No premorbid history of migraine headaches. No premorbid history of neck or back pains.    Ophthalmological History: Last eye clinic appointment on 1 year ago, with normal pressures, no papilledema, and no abnormalities reported.     Sleep History: No symptoms of sleep apnea (e.g., snoring, gasping, frequent nighttime awakening, daytime fatigue).       Abortive therapies (tried and failed):     Advil 800 mg Q8H PRN - not helping     Rizatriptan 10 mg daily p.r.n. Patient reports " she has not started taking medication yet.  She is receptive to trialing therapy.     Preventative therapies (tried and failed): None    Pregnancy and birth control: None       Review of Systems   Constitutional:  Negative for activity change, appetite change, chills, diaphoresis, fatigue, fever and unexpected weight change.   HENT:  Positive for tinnitus. Negative for congestion, dental problem, drooling, ear discharge, ear pain, facial swelling, hearing loss, mouth sores, nosebleeds, postnasal drip, rhinorrhea, sinus pressure, sinus pain, sneezing, sore throat, trouble swallowing and voice change.    Eyes:  Positive for photophobia and visual disturbance. Negative for pain, discharge, redness and itching.        Bilateral eye pressure   Respiratory:  Negative for cough, chest tightness, shortness of breath and wheezing.    Cardiovascular:  Negative for chest pain, palpitations and leg swelling.   Gastrointestinal:  Positive for nausea. Negative for abdominal distention, abdominal pain, blood in stool, constipation, diarrhea and vomiting.   Endocrine: Negative for cold intolerance, heat intolerance, polydipsia, polyphagia and polyuria.   Genitourinary:  Negative for decreased urine volume, difficulty urinating, dysuria, flank pain, frequency, hematuria, pelvic pain, urgency and vaginal discharge.   Musculoskeletal:  Positive for neck pain. Negative for arthralgias, back pain, gait problem, joint swelling, myalgias and neck stiffness.        Neck Pain with radiation to the left upper extremity   Skin:  Negative for color change and rash.   Allergic/Immunologic: Negative for immunocompromised state.   Neurological:  Positive for dizziness, light-headedness, numbness and headaches. Negative for tremors, seizures, syncope, facial asymmetry, speech difficulty and weakness.        Patient does report scalp sensitivity to left occipital area    Patient spells of loss of awareness where she stares into space since head  injury   Hematological:  Negative for adenopathy. Does not bruise/bleed easily.   Psychiatric/Behavioral:  Negative for agitation, behavioral problems, confusion, decreased concentration, dysphoric mood, hallucinations, self-injury, sleep disturbance and suicidal ideas. The patient is not nervous/anxious and is not hyperactive.    All other systems reviewed and are negative.                Current Outpatient Medications:     ferrous sulfate 325 (65 FE) MG EC tablet, Take 1 tablet (325 mg total) by mouth once daily. (Patient not taking: Reported on 9/18/2024), Disp: 30 tablet, Rfl: 5    ibuprofen (ADVIL,MOTRIN) 800 MG tablet, Take 800 mg by mouth every 8 (eight) hours as needed., Disp: , Rfl:     rizatriptan (MAXALT) 10 MG tablet, Take 1 tablet (10 mg total) by mouth daily as needed for Migraine. If symptoms persist or return, may repeat dose after 2 hours. Do not exceed more than 2 tablets in 24hr period, Disp: 10 tablet, Rfl: 0    Past Medical History:   Diagnosis Date    Anemia     Encounter for blood transfusion        No past surgical history on file.    Social History     Socioeconomic History    Marital status: Single   Tobacco Use    Smoking status: Never    Smokeless tobacco: Never   Substance and Sexual Activity    Alcohol use: No    Drug use: No    Sexual activity: Yes     Partners: Male     Birth control/protection: None         Past/Current Medical/Surgical History, Past/Current Social History, Past/Current Family History and Past/Current Medications were reviewed in detail.    Objective:           VITAL SIGNS WERE REVIEWED      GENERAL APPEARANCE:     The patient looks comfortable.    BMI    No signs of respiratory distress.    Normal breathing pattern.    No dysmorphic features    Normal eye contact.       GENERAL MEDICAL EXAM:    HEENT:  Head is atraumatic normocephalic.     FUNDUSCOPIC (OPHTHALMOSCOPIC) EXAMINATION showed no disc edema (papilledema).      NECK: No JVD. No visible lesions or goiters.      CHEST-CARDIOPULMONARY: No cyanosis. No tachypnea. Normal respiratory effort.    [id number]-[id number]: No jaundice. No stomas or lesions. No visible hernias. No catheters.     SKIN, HAIR, NAILS: No pathognomonic skin rash.No neurofibromatosis. No visible lesions.No stigmata of autoimmune disease. No clubbing.    LIMBS: No varicose veins. No visible swelling.    MUSCULOSKELETAL: No visible deformities.No visible lesions.             Neurological Exam  Mental Status  Awake, alert and oriented to person, place and time. Oriented to person, place, time and situation. Recent and remote memory are intact. At 5 minutes recalls 3 of 3 objects. Speech is normal. Language is fluent with no aphasia. Attention and concentration are normal. Fund of knowledge is appropriate for level of education. Apraxia absent.    Cranial Nerves  CN I: Sense of smell is normal.  CN II: Visual acuity is normal. Visual fields full to confrontation. Right funduscopic exam: disc intact. Left funduscopic exam: disc intact.  CN III, IV, VI: Extraocular movements intact bilaterally. Normal lids and orbits bilaterally. Pupils equal round and reactive to light bilaterally.  CN V: Facial sensation is normal.  CN VII: Full and symmetric facial movement.  CN VIII: Hearing is normal.  CN IX, X: Palate elevates symmetrically. Normal gag reflex.  CN XI: Shoulder shrug strength is normal.  CN XII: Tongue midline without atrophy or fasciculations.    Motor  Normal muscle bulk throughout. No fasciculations present. Normal muscle tone. No abnormal involuntary movements. Strength is 5/5 throughout all four extremities.    Sensory  Sensation is intact to light touch, pinprick, vibration and proprioception in all four extremities.    Reflexes                                            Right                      Left  Brachioradialis                    2+                         2+  Biceps                                 2+                          2+  Triceps                                2+                         2+  Finger flex                           2+                         2+  Hamstring                            2+                         2+  Patellar                                2+                         2+  Achilles                                2+                         2+    Coordination  Right: Finger-to-nose normal. Rapid alternating movement normal. Heel-to-shin normal.Left: Finger-to-nose normal. Rapid alternating movement normal. Heel-to-shin normal.    Gait  Casual gait is normal including stance, stride, and arm swing.Normal toe walking. Normal heel walking. Normal tandem gait. Romberg is absent. Normal pull test. Able to rise from chair without using arms.        Lab Results   Component Value Date    WBC 4.91 08/21/2024    HGB 9.3 (L) 08/21/2024    HCT 30.2 (L) 08/21/2024    MCV 78 (L) 08/21/2024     (H) 08/21/2024       Sodium   Date Value Ref Range Status   08/21/2024 138 136 - 145 mmol/L Final     Potassium   Date Value Ref Range Status   08/21/2024 4.3 3.5 - 5.1 mmol/L Final     Chloride   Date Value Ref Range Status   08/21/2024 108 95 - 110 mmol/L Final     CO2   Date Value Ref Range Status   08/21/2024 25 23 - 29 mmol/L Final     Glucose   Date Value Ref Range Status   08/21/2024 93 70 - 110 mg/dL Final     BUN   Date Value Ref Range Status   08/21/2024 11 6 - 20 mg/dL Final     Creatinine   Date Value Ref Range Status   08/21/2024 0.9 0.5 - 1.4 mg/dL Final     Calcium   Date Value Ref Range Status   08/21/2024 9.1 8.7 - 10.5 mg/dL Final     Total Protein   Date Value Ref Range Status   08/21/2024 6.1 6.0 - 8.4 g/dL Final     Albumin   Date Value Ref Range Status   08/21/2024 3.1 (L) 3.5 - 5.2 g/dL Final     Total Bilirubin   Date Value Ref Range Status   08/21/2024 0.3 0.1 - 1.0 mg/dL Final     Comment:     For infants and newborns, interpretation of results should be based  on gestational age, weight and in  "agreement with clinical  observations.    Premature Infant recommended reference ranges:  Up to 24 hours.............<8.0 mg/dL  Up to 48 hours............<12.0 mg/dL  3-5 days..................<15.0 mg/dL  6-29 days.................<15.0 mg/dL       Alkaline Phosphatase   Date Value Ref Range Status   08/21/2024 74 55 - 135 U/L Final     AST   Date Value Ref Range Status   08/21/2024 19 10 - 40 U/L Final     ALT   Date Value Ref Range Status   08/21/2024 21 10 - 44 U/L Final     Anion Gap   Date Value Ref Range Status   08/21/2024 5 (L) 8 - 16 mmol/L Final       No results found for: "ACJSTNMY52"    Lab Results   Component Value Date    TSH 0.935 08/21/2024       No results found in the last 24 hours.    No results found in the last 24 hours.    Reviewed the neuroimaging independently       Assessment:   39 Years old Female with PMH as above came for an evaluation of "Headaches".    Nonintractable headache, unspecified chronicity pattern, unspecified headache type     Post-concussion headache     Injury of head, subsequent encounter     Spell of loss of consciousness     Double vision with both eyes open     Blurry vision, bilateral     Neck pain, bilateral     Cervical radiculopathy     Dizziness and giddiness     Nausea     Tinnitus of both ears     Other amnesia     Plan:   Patient Neurological Assessment is non-focal.  Patient's history and physical exam point to rule out seizures, postconcussion headache, migraines, vestibular migraine, BPPV, cervical stenosis.       Nonintractable headache, unspecified chronicity pattern, unspecified headache type / Post-concussion headache / Injury of head, subsequent encounter     -Order brain MRI without contrast to rule out structural abnormalities contributing to symptoms.    -Start Topamax 25 mg PO BID for headache prevention therapy and seizure prophylaxis.  Side effects discussed.  Patient verbalized understanding.  No past medical history of kidney stones or glaucoma " per patient.    -Continue rizatriptan 10 mg daily p.r.n. for headache abortive therapy as previously prescribed.  Side effects discussed.  Patient verbalized understanding.  Patient reports she has not started taking medication yet.  She is receptive to trialing therapy.    -Continue ibuprofen 800 mg p.o. every 8 hours p.r.n. for headache abortive therapy.  Side effects discussed.  Patient verbalized understanding.    -Ordered PT therapy for migraine prevention therapy / vestibular therapy / neck therapy    -Order MAGGY / ESR / CRP / HCG / FA / Homocysteine / RPR / B1 / B12 / for neurologic evaluation.    Spell of loss of consciousness     -Order routine EEG to rule out seizure activity.    -No driving policies discussed until seizures are ruled out.  Patient verbalized full understanding.    Double vision with both eyes open / Blurry vision, bilateral     -Ordered ophthalmology referral for further evaluation and recommendation of patient's visual symptoms.    Neck pain, bilateral / Cervical radiculopathy / Dizziness and giddiness     -Ordered MRI cervical spine without contrast to rule out structural abnormalities contributing to symptoms.     Nausea     -Offered patient pharmacological therapy for nausea.  She declines at this time and reports it is manageable with lifestyle modifications.    Tinnitus of both ears     -Offered ENT referral for evaluation of tinnitus, but patient declines at this time.              LABORATORY EVALUATION    Labs: (2024) TSH / CBC / CMP / A1C / HIV / Lipid Panel / Hepatitis C Ab   -personally reviewed -non-significant abnormalities except     H&H (9.3 / 30.2) Iron & TIBC (18 / 451 ) - Followed by PCP      RADIOLOGY EVALUATION     None        NEUROPHYSIOLOGY EVALUATION       PATHOLOGY EVALUATION        NEUROCOGNITIVE AND NEUROPSYCHOLOGY EVALUATION                        Concussion occurs at roughly 90 to 100 g-force, which equates to smashing your skull against a wall at 20  mph.    Cognitive and Physical Rest.    Minimize Stimulation.        If symptoms worsen, new symptoms show up or symptoms persist beyond 2 weeks will get Brain MRI (ASL)    If headache continues beyond 2 weeks start: Amitriptyline/Elavil and monitor for sedation.     If cognitive symptoms continue beyond 2 weeks start: Amantadine 100 mg BID.         RETURN TO SCHOOL/SPORT/EXCERCISE AS LONG AS SYMPTOM FREE     MOST CRITICAL IS THE FIRST 3 MONTHS AND ESPECIALLY FIRST 7-10 DAYS       SCHOOL:     Rest for 7 Days     Half Day School for 3 Days         SPORT/EXERCISE:       Rest for 10 Days     Minimal Aerobic Exercise for 3 Days    Moderate Aerobic Exercise for 3 Days    Non-contact Training for 3 Days     Intermittent Contact for 3 Days    Full Contact          MEDICAL/SURGICAL COMORBIDITIES     All relevant medical comorbidities noted and managed by primary care physician and medical care team.          HEALTHY LIFESTYLE AND PREVENTATIVE CARE    The patient to adhere to the age-appropriate health maintenance guidelines including screening tests and vaccinations. The patient to adhere to  healthy lifestyle, optimal weight, exercise, healthy diet, good sleep hygiene and avoiding drugs including smoking, alcohol and recreational drugs.    I spent a total of 75 minutes on the day of the visit.This includes face to face time and non-face to face time preparing to see the patient (eg, review of tests), obtaining and/or reviewing separately obtained history, documenting clinical information in the electronic or other health record, independently interpreting results and communicating results to the patient/family/caregiver, or care coordinator.     Please do not hesitate to contact me with any updates, questions or concerns.    No follow-ups on file.    Willie Berumen, MSN, FNP-C    General Neurology

## 2024-11-04 ENCOUNTER — PATIENT MESSAGE (OUTPATIENT)
Dept: NEUROLOGY | Facility: CLINIC | Age: 39
End: 2024-11-04

## 2024-11-04 ENCOUNTER — OFFICE VISIT (OUTPATIENT)
Dept: NEUROLOGY | Facility: CLINIC | Age: 39
End: 2024-11-04
Payer: COMMERCIAL

## 2024-11-04 ENCOUNTER — LAB VISIT (OUTPATIENT)
Dept: LAB | Facility: HOSPITAL | Age: 39
End: 2024-11-04
Payer: COMMERCIAL

## 2024-11-04 VITALS
OXYGEN SATURATION: 99 % | DIASTOLIC BLOOD PRESSURE: 90 MMHG | WEIGHT: 172.63 LBS | RESPIRATION RATE: 16 BRPM | BODY MASS INDEX: 30.59 KG/M2 | SYSTOLIC BLOOD PRESSURE: 147 MMHG | HEART RATE: 62 BPM | HEIGHT: 63 IN

## 2024-11-04 DIAGNOSIS — R55 SPELL OF LOSS OF CONSCIOUSNESS: ICD-10-CM

## 2024-11-04 DIAGNOSIS — H93.13 TINNITUS OF BOTH EARS: ICD-10-CM

## 2024-11-04 DIAGNOSIS — G44.309 POST-CONCUSSION HEADACHE: ICD-10-CM

## 2024-11-04 DIAGNOSIS — M54.2 NECK PAIN, BILATERAL: ICD-10-CM

## 2024-11-04 DIAGNOSIS — M54.12 CERVICAL RADICULOPATHY: ICD-10-CM

## 2024-11-04 DIAGNOSIS — R42 DIZZINESS AND GIDDINESS: ICD-10-CM

## 2024-11-04 DIAGNOSIS — H53.8 BLURRY VISION, BILATERAL: ICD-10-CM

## 2024-11-04 DIAGNOSIS — R51.9 NONINTRACTABLE HEADACHE, UNSPECIFIED CHRONICITY PATTERN, UNSPECIFIED HEADACHE TYPE: Primary | ICD-10-CM

## 2024-11-04 DIAGNOSIS — H53.2 DOUBLE VISION WITH BOTH EYES OPEN: ICD-10-CM

## 2024-11-04 DIAGNOSIS — R11.0 NAUSEA: ICD-10-CM

## 2024-11-04 DIAGNOSIS — R41.3 OTHER AMNESIA: ICD-10-CM

## 2024-11-04 DIAGNOSIS — S09.90XD INJURY OF HEAD, SUBSEQUENT ENCOUNTER: ICD-10-CM

## 2024-11-04 DIAGNOSIS — R51.9 NONINTRACTABLE HEADACHE, UNSPECIFIED CHRONICITY PATTERN, UNSPECIFIED HEADACHE TYPE: ICD-10-CM

## 2024-11-04 LAB
CRP SERPL-MCNC: 0.6 MG/L (ref 0–8.2)
ERYTHROCYTE [SEDIMENTATION RATE] IN BLOOD BY PHOTOMETRIC METHOD: 23 MM/HR (ref 0–36)
FOLATE SERPL-MCNC: 7.2 NG/ML (ref 4–24)
HCG INTACT+B SERPL-ACNC: <2.4 MIU/ML
HCYS SERPL-SCNC: 9.8 UMOL/L (ref 4–15.5)
TREPONEMA PALLIDUM IGG+IGM AB [PRESENCE] IN SERUM OR PLASMA BY IMMUNOASSAY: REACTIVE
VIT B12 SERPL-MCNC: 582 PG/ML (ref 210–950)

## 2024-11-04 PROCEDURE — 36415 COLL VENOUS BLD VENIPUNCTURE: CPT

## 2024-11-04 PROCEDURE — 83090 ASSAY OF HOMOCYSTEINE: CPT

## 2024-11-04 PROCEDURE — 86592 SYPHILIS TEST NON-TREP QUAL: CPT

## 2024-11-04 PROCEDURE — 84425 ASSAY OF VITAMIN B-1: CPT

## 2024-11-04 PROCEDURE — 85652 RBC SED RATE AUTOMATED: CPT

## 2024-11-04 PROCEDURE — 84702 CHORIONIC GONADOTROPIN TEST: CPT

## 2024-11-04 PROCEDURE — 82607 VITAMIN B-12: CPT

## 2024-11-04 PROCEDURE — 86140 C-REACTIVE PROTEIN: CPT

## 2024-11-04 PROCEDURE — 99999 PR PBB SHADOW E&M-EST. PATIENT-LVL V: CPT | Mod: PBBFAC,,,

## 2024-11-04 PROCEDURE — 86780 TREPONEMA PALLIDUM: CPT

## 2024-11-04 PROCEDURE — 86593 SYPHILIS TEST NON-TREP QUANT: CPT

## 2024-11-04 PROCEDURE — 82746 ASSAY OF FOLIC ACID SERUM: CPT

## 2024-11-04 PROCEDURE — 86038 ANTINUCLEAR ANTIBODIES: CPT

## 2024-11-04 RX ORDER — TOPIRAMATE 25 MG/1
25 TABLET ORAL 2 TIMES DAILY
Qty: 60 TABLET | Refills: 0 | Status: SHIPPED | OUTPATIENT
Start: 2024-11-04 | End: 2024-12-04

## 2024-11-05 ENCOUNTER — TELEPHONE (OUTPATIENT)
Dept: NEUROLOGY | Facility: CLINIC | Age: 39
End: 2024-11-05
Payer: COMMERCIAL

## 2024-11-05 DIAGNOSIS — A53.9 SERUM POSITIVE FOR TREPONEMA PALLIDUM BY PCR: Primary | ICD-10-CM

## 2024-11-05 LAB
ANA SER QL IF: NORMAL
RPR SER QL: NORMAL

## 2024-11-05 NOTE — PROGRESS NOTES
Good-morning Mrs. Horner,     Your lab results are in.    Treponema Pallidum Antibodies (IgG, IgM) is reactive.  I will send a referral into Infectious Disease for further evaluation and recommendation of this finding.     Folate is normal (7.2), but on the low end of normal. Normal levels are (4-24).      I recommend that you start taking a daily vitamin with 400 mcg of folic acid in it.      Vitamin B-12 is normal (582), but on the low end of normal. Normal levels are (210-950).    The recommended intake for vitamin B12 is 2.4 mcg per day for adults. I recommend that you start taking a daily vitamin with 2.4 mcg of B-12 in it.     Here are a few examples of foods that contain Vitamin B-12 that you can consume: Beef liver, Clams, Oysters, [place], Tuna, Ground Beef, 2% milk, Fat free plain yogurt, fortified breakfast cereals, cheddar cheese, eggs, turkey breast, etc.     Homocystine levels are normal.    HCG pregnancy is negative.    CRP levels are normal.    Sed rate levels are normal.    Still waiting on RPR, MAGGY screen, and vitamin B1.    We will discuss results further at your next follow up visit.   Please let me know if you have any questions or concerns.  Have a great day!    NATHAN Tapia

## 2024-11-05 NOTE — TELEPHONE ENCOUNTER
----- Message from Willie Berumen NP sent at 11/5/2024  8:52 AM CST -----  Good-morning Mrs. Horner,     Your lab results are in.    Treponema Pallidum Antibodies (IgG, IgM) is reactive.  I will send a referral into Infectious Disease for further evaluation and recommendation of this finding.     Folate is normal (7.2), but on the low end of normal. Normal levels are (4-24).      I recommend that you start taking a daily vitamin with 400 mcg of folic acid in it.      Vitamin B-12 is normal (582), but on the low end of normal. Normal levels are (210-950).    The recommended intake for vitamin B12 is 2.4 mcg per day for adults. I recommend that you start taking a daily vitamin with 2.4 mcg of B-12 in it.     Here are a few examples of foods that contain Vitamin B-12 that you can consume: Beef liver, Clams, Oysters, [place], Tuna, Ground Beef, 2% milk, Fat free plain yogurt, fortified breakfast cereals, cheddar cheese, eggs, turkey breast, etc.     Homocystine levels are normal.    HCG pregnancy is negative.    CRP levels are normal.    Sed rate levels are normal.    Still waiting on RPR, MAGGY screen, and vitamin B1.    We will discuss results further at your next follow up visit.   Please let me know if you have any questions or concerns.  Have a great day!    NATHAN Tapia

## 2024-11-05 NOTE — PROGRESS NOTES
Good-afternoon Mrs. Horner,     RPR came back nonreactive.  I recommend that you continue following with infectious disease referral for further evaluation and recommendation of reactive Treponema Pallidum Antibodies (IgG, IgM).    MAGGY screen is negative.    We will discuss results further at your next follow up visit.   Please let me know if you have any questions or concerns.  Have a great day!    NATHAN Tapia

## 2024-11-07 LAB — T PALLIDUM AB SER QL IF: REACTIVE

## 2024-11-08 LAB — VIT B1 BLD-MCNC: 36 UG/L (ref 38–122)

## 2024-11-08 NOTE — PROGRESS NOTES
Good-afternoon Mrs. Horner,     Your vitamin B1 (thiamine) levels are slightly low (36). I recommend starting an over-the-counter vitamin B1 supplement, with a suggested intake of 1.1 mg/day for females. In addition to supplementation, vitamin B1 can be obtained from a variety of dietary sources, including:  · Whole grains and fortified cereals  · Legumes (such as beans and lentils)  · Nuts and seeds  · Pork and fish  · Eggs  · Green vegetables (including spinach and asparagus)    Incorporating these foods into your diet may help improve your vitamin B1 levels. Please feel free to reach out if you have any questions or need further guidance.    We will discuss results further at your next follow up visit.   Please let me know if you have any questions or concerns.  Have a great day!    NATHAN Tapia

## 2024-11-15 ENCOUNTER — CLINICAL SUPPORT (OUTPATIENT)
Dept: REHABILITATION | Facility: HOSPITAL | Age: 39
End: 2024-11-15
Payer: COMMERCIAL

## 2024-11-15 DIAGNOSIS — Z74.09 DECREASED FUNCTIONAL MOBILITY AND ENDURANCE: Primary | ICD-10-CM

## 2024-11-15 DIAGNOSIS — R51.9 NONINTRACTABLE HEADACHE, UNSPECIFIED CHRONICITY PATTERN, UNSPECIFIED HEADACHE TYPE: ICD-10-CM

## 2024-11-15 DIAGNOSIS — M62.81 PROXIMAL MUSCLE WEAKNESS: ICD-10-CM

## 2024-11-15 DIAGNOSIS — R29.898 DECREASED RANGE OF MOTION OF NECK: ICD-10-CM

## 2024-11-15 DIAGNOSIS — M54.2 NECK PAIN, BILATERAL: ICD-10-CM

## 2024-11-15 DIAGNOSIS — R29.3 POOR POSTURE: ICD-10-CM

## 2024-11-15 PROCEDURE — 97140 MANUAL THERAPY 1/> REGIONS: CPT

## 2024-11-15 PROCEDURE — 97535 SELF CARE MNGMENT TRAINING: CPT

## 2024-11-15 PROCEDURE — 97162 PT EVAL MOD COMPLEX 30 MIN: CPT

## 2024-11-15 NOTE — PLAN OF CARE
[facility]   Physical Therapy Initial Evaluation      Date: 11/15/2024   Name: Izzy Horner  [facility] Number: [id number]    Therapy Diagnosis:    Encounter Diagnoses   Name Primary?    Nonintractable headache, unspecified chronicity pattern, unspecified headache type     Neck pain, bilateral     Decreased functional mobility and endurance Yes    Decreased range of motion of neck     Poor posture     Proximal muscle weakness       Physician: Willie Berumen NP     Physician Orders: PT Eval and Treat  Medical Diagnosis from Referral: Nonintractable headache, unspecified chronicity pattern, unspecified headache type [R51.9], Neck pain, bilateral [M54.2]   Evaluation Date: 11/15/2024  Authorization Period Expiration: 12/31/2024   Plan of Care Expiration: 2/15/2025  Progress Note Due: 12/15/2024  Visit # / Visits authorized: 1/1   FOTO: 1/3 (last performed on 11/15/2024)    Precautions: Standard    Time In: 1100  Time Out: 1159  Total Billable Time (timed & untimed codes): 54 minutes    Subjective     Date of onset: July 2024     History of current condition - 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 States she instantly started to fall down and caught herself on a shelf. Was able to make it to a chair and was eventually brought to the wellness center. States she went to the MD later that day and was diagnosed with a mild concussion. She has been having headaches ~4 times per week since this time. Gets occasional ringing of the ears, dizziness,double vision auras and states that sometimes the pain is unbearable. Struggling to look at the computer for school for long periods of time. Symptoms are mostly on the left but sometimes shoots to the right. Headaches typically last 3 hours. Denies any headaches prior to this accident. Is not allowed to work right now so she has to sit in  the wellness center at work.     Imaging: [] Xray [] MRI [] CT: Performed on: no relevant imaging on file     Pain:  Current 5/10, worst 10/10, best 0/10   Location: [] Right   [x] Left:  head  Description: pressure, dizziness, headache   Aggravating Factors: lights, loud noise, turning her neck, holding her head down looking at her phone or computer for too long.   Easing Factors: activity avoidance, rest, sleep, dark room, ibuprofen     Prior Therapy:   [x] N/A    [] Yes:   Social History: Pt lives with their family  Occupation: Pt works in the [employer] at [employer]   Prior Level of Function: Independent and pain free with all ADL, IADL, community mobility and functional activities.   Current Level of Function: Independent with all ADL, IADL, community mobility and functional activities with reports of increased pain and need for increased time and frequent breaks.      Dominant Extremity:    [x] Right    [] Left    Pts goals: Pt reported goals are to decrease overall pain levels in order to return to prior functional level.     Medical History:   Past Medical History:   Diagnosis Date    Anemia     Encounter for blood transfusion        Surgical History:   Izzy Horner  has no past surgical history on file.    Medications:   Izzy has a current medication list which includes the following prescription(s): ferrous sulfate, ibuprofen, rizatriptan, and topiramate.    Allergies:   Review of patient's allergies indicates:  No Known Allergies     Objective        RANGE OF MOTION:   Cervical Right   (spine) Left    Pain/Dysfunction with Movement Goal   Cervical Flexion (60º) 40 ---  60   Cervical Extension (80º) 18 ---  45   Cervical Side Bending (45º) 18 22  45   Cervical Rotation (75º) 25 35  60        STRENGTH: (* denotes pain)  U/E MMT Right Left Dysfunction with Movement Goal   Shoulder Flexion 4-/5 4-/5  4+/5 B   Shoulder Extension 4/5 4/5  4+/5 B   Shoulder Abduction 4-/5 4-/5  4+/5 B   Shoulder IR  4/5 4/5  4+/5 B   Shoulder ER 4-/5 4-/5  4+/5 B   Serratus Anterior    4+/5 B   Middle Trapezius    4+/5 B   Lower Trapezius    4+/5 B   Elbow Flexion     5/5 B   Elbow Extension    5/5 B   Wrist Flexion    5/5 B   Wrist Extension    5/5 B       SENSATION:  Intact to Light Touch       PALPATION: Muscles: Increased tone and tenderness to palpation of: bilateral suboccipitals, paraspinals, upper trapezius, temporalis, .       POSTURE:   Pt presents with postural abnormalities which include: forward head, rounded shoulders , and increased thoracic kyphosis         Function:     Intake Outcome Measure for FOTO Orofacial Survey    Therapist reviewed FOTO scores for Izzy on 11/15/2024.   FOTO report - see Media section or FOTO account for episode details    Intake Score: 41%         Treatment     Total Treatment time (time-based codes) separate from Evaluation: (25) minutes     Izzy received the treatments listed below:      Intervention Performed   Today Duration / Intensity   MT Soft tissue mobilization  x B suboccipitals and temporalis    TE           Cervical AROM - 6 ways  x 10x each direction             NMR                             TA                                                                               PLAN UBE   Upper trapezius stretch   Cervical isometrics   Chin tucks   Supine cervical rotations   Cervical retractions   B shoulder ER   Rows   Shoulder extensions                 CPT Codes available for Billing:   (10) minutes of Manual therapy (MT) to improve pain and ROM.  (05) minutes of Therapeutic Exercise (TE) to develop strength, endurance, range of motion, and flexibility.  (00) minutes of Neuromuscular Re-Education (NMR)  to improve: Balance, Coordination, Kinesthetic, Sense, Proprioception, and Posture.  (00) minutes of Therapeutic Activities (TA) to improve functional performance.  Vasopneumatic Device Therapy () for management of swelling/edema. ([id number])  Unattended Electrical  Stimulation (ES) for muscle performance or pain modulation.  BFR: Blood flow restriction applied during exercise    Patient Education and Home Exercises     Education provided: (10) minutes  PURPOSE: Patient educated on the impairments noted above and the effects of physical therapy intervention to improve overall condition and QOL.   EXERCISE: Patient was educated on all the above exercise prior/during/after for proper posture, positioning, and execution for safe performance with home exercise program.   STRENGTH: Patient educated on the importance of improved core and extremity strength in order to improve alignment of the spine and extremities with static positions and dynamic movement.   POSTURE: Patient educated on postural awareness to reduce stress and maintain optimal alignment of the spine with static positions and dynamic movement     Written Home Exercises Provided: yes.  Exercises were reviewed and Izzy was able to demonstrate them prior to the end of the session.  Izzy demonstrated good  understanding of the education provided. See EMR under Patient Instructions for exercises provided during therapy sessions.    Assessment     Izzy is a 39 y.o. female referred to outpatient Physical Therapy with a medical diagnosis of Nonintractable headache, Neck pain, bilateral. Pt presents with impairments in the following categories: IMPAIRMENTS: ROM, strength, posture, and core strength and stability    Pt prognosis is Good  Pt will benefit from skilled outpatient Physical Therapy to address the deficits stated above and in the chart below, provide pt/family education, and to maximize pt's level of independence.     Plan of care discussed with patient: Yes  Pt's spiritual, cultural and educational needs considered and patient is agreeable to the plan of care and goals as stated below:     Anticipated Barriers for therapy: chronicity of condition, adherence to treatment plan, and occupation    Medical  "Necessity is demonstrated by the following  History  Co-morbidities and personal factors that may impact the plan of care [] LOW: no personal factors / co-morbidities  [x] MODERATE: 1-2 personal factors / co-morbidities  [] HIGH: 3+ personal factors / co-morbidities    Moderate / High Support Documentation: chronicity of condition  Past Medical History:   Diagnosis Date    Anemia     Encounter for blood transfusion         Examination  Body Structures and Functions, activity limitations and participation restrictions that may impact the plan of care [] LOW: addressing 1-2 elements  [x] MODERATE: 3+ elements  [] HIGH: 4+ elements (please support below)    Moderate / High Support Documentation: See above in "Current Level of Function"      Clinical Presentation [] LOW: stable  [x] MODERATE: Evolving  [] HIGH: Unstable     Decision Making/ Complexity Score: moderate         Short Term Goals:  6 weeks Status  Date Met   PAIN: Pt will report worst pain of 7/10 in order to progress toward max functional ability and improve quality of life. [x] Progressing  [] Met  [] Not Met    FUNCTION: Patient will demonstrate improved function as indicated by a score of greater than or equal to 46 out of 100 on FOTO. [x] Progressing  [] Met  [] Not Met    MOBILITY: Patient will improve AROM to 50% of stated goals, listed in objective measures above, in order to progress towards independence with functional activities.  [x] Progressing  [] Met  [] Not Met    STRENGTH: Patient will improve strength to 50% of stated goals, listed in objective measures above, in order to progress towards independence with functional activities. [x] Progressing  [] Met  [] Not Met    POSTURE: Patient will correct postural deviations in sitting and standing, to decrease pain and promote long term stability.  [x] Progressing  [] Met  [] Not Met    HEP: Patient will demonstrate independence with HEP in order to progress toward functional independence. [x] " Progressing  [] Met  [] Not Met      Long Term Goals:  12 weeks Status Date Met   PAIN: Pt will report worst pain of 4/10 in order to progress toward max functional ability and improve quality of life [x] Progressing  [] Met  [] Not Met    FUNCTION: Patient will demonstrate improved function as indicated by a score of greater than or equal to 51 out of 100 on FOTO. [x] Progressing  [] Met  [] Not Met    MOBILITY: Patient will improve AROM to stated goals, listed in objective measures above, in order to return to maximal functional potential and improve quality of life.  [x] Progressing  [] Met  [] Not Met    STRENGTH: Patient will improve strength to stated goals, listed in objective measures above, in order to improve functional independence and quality of life.  [x] Progressing  [] Met  [] Not Met    Patient will return to normal ADL's, IADL's, community involvement, recreational activities, and work-related activities with less than or equal to 4/10 pain and maximal function.  [x] Progressing  [] Met  [] Not Met      Plan     Plan of care Certification: 11/15/2024 to 2/15/2025.    Outpatient Physical Therapy 2 times weekly for 12 weeks to include any combination of the following interventions: virtual visits, dry needling, modalities, electrical stimulation (IFC, Pre-Mod, Attended with Functional Dry Needling), Cervical/Lumbar Traction, Gait Training, Manual Therapy, Neuromuscular Re-ed, Patient Education, Self Care, Therapeutic Exercise, and Therapeutic Activites     Randa Brady, PT, DPT

## 2024-11-18 ENCOUNTER — CLINICAL SUPPORT (OUTPATIENT)
Dept: REHABILITATION | Facility: HOSPITAL | Age: 39
End: 2024-11-18
Payer: COMMERCIAL

## 2024-11-18 DIAGNOSIS — M62.81 PROXIMAL MUSCLE WEAKNESS: ICD-10-CM

## 2024-11-18 DIAGNOSIS — Z74.09 DECREASED FUNCTIONAL MOBILITY AND ENDURANCE: Primary | ICD-10-CM

## 2024-11-18 DIAGNOSIS — R29.3 POOR POSTURE: ICD-10-CM

## 2024-11-18 DIAGNOSIS — R29.898 DECREASED RANGE OF MOTION OF NECK: ICD-10-CM

## 2024-11-18 PROCEDURE — 97140 MANUAL THERAPY 1/> REGIONS: CPT

## 2024-11-18 PROCEDURE — 97112 NEUROMUSCULAR REEDUCATION: CPT

## 2024-11-18 PROCEDURE — 97110 THERAPEUTIC EXERCISES: CPT

## 2024-11-18 NOTE — PROGRESS NOTES
[facility]   Physical Therapy Treatment Note      Name: Izzy Horner  [facility] Number: [id number]    Therapy Diagnosis:   Encounter Diagnoses   Name Primary?    Decreased functional mobility and endurance Yes    Decreased range of motion of neck     Poor posture     Proximal muscle weakness      Physician: Willie Berumen NP    Visit Date: 11/18/2024    Physician Orders: PT Eval and Treat  Medical Diagnosis from Referral: Nonintractable headache, unspecified chronicity pattern, unspecified headache type [R51.9], Neck pain, bilateral [M54.2]   Evaluation Date: 11/15/2024  Authorization Period Expiration: 12/31/2024   Plan of Care Expiration: 2/15/2025  Progress Note Due: 12/15/2024  Visit # / Visits authorized: 1/25 (+1 Evaluation)  FOTO: 1/3 (last performed on 11/15/2024)     Precautions: Standard    Time In: 9:00 AM  Time Out: 9:45 AM  Total Billable Time: 45 minutes    SUBJECTIVE     Patient reports: she is actually not having a headache today. Patient states she is willing to do whatever she needs to do to get better. Patient also reports she has an MRI scheduled for after today's appointment as well.  She was compliant with home exercise program.  Response to previous treatment: good  Functional change: decreased intensity of headaches since initial evaluation     Pain: 0/10  Location: left head    OBJECTIVE     Objective Measures updated at progress report unless specified.     TREATMENT     CPT Intervention Performed  11/18/2024  Duration / Intensity     TE  UPPER BODY ERGOMETER  x  3 minutes forward, 3 minutes backwards    Upper Trapezius Stretch x 3 x 30 seconds left                      NMR  Supine Chin Tucks x  2 minutes 3 second holds      Scapular Retractions  x  3 x 10 green band      Shoulder Extensions x  3 x 10 red band     Seated Bilateral EXTERNAL ROTATION  x  3 x 10 red band      supine scapular retractions x 3 minutes 5 second holds, one second off             TA                                                         MT  seated left upper trapezial x  8 minutes   PLAN  UBE   Upper trapezius stretch   Cervical isometrics   Chin tucks   Supine cervical rotations   Cervical retractions   B shoulder ER   Rows   Shoulder extensions             CPT Codes available for Billing:   (08) minutes of Manual therapy (MT) to improve pain and ROM.  (09) minutes of Therapeutic Exercise (TE) to develop strength, endurance, range of motion, and flexibility.  (28) minutes of Neuromuscular Re-Education (NMR)  to improve: Balance, Coordination, Kinesthetic, Sense, Proprioception, and Posture.  (00) minutes of Therapeutic Activities (TA) to improve functional performance.  Unattended Electrical Stimulation (ES) for muscle performance or pain modulation.  BFR: Blood flow restriction applied during exercise  NP or (-): Not Performed    PATIENT EDUCATION AND HOME EXERCISES     Home Exercises Provided and Patient Education Provided     Education provided:   - PURPOSE: Patient educated on the impairments noted above and the effects of physical therapy intervention to improve overall condition and QOL.   EXERCISE: Patient was educated on all the above exercise prior/during/after for proper posture, positioning, and execution for safe performance with home exercise program.   STRENGTH: Patient educated on the importance of improved core and extremity strength in order to improve alignment of the spine and extremities with static positions and dynamic movement.     Written Home Exercises Provided: Patient instructed to cont prior HEP. Exercises were reviewed and Izzy was able to demonstrate them prior to the end of the session.  Izzy demonstrated good  understanding of the education provided. See EMR under Patient Instructions for exercises provided during therapy sessions      ASSESSMENT   Patient tolerated first treatment session fairly well. Patient noted to have some familiar headache  symptoms with chin tucks, therefore patient educated to perform these with less overall effort to which this did help decrease familiar symptoms. Patient noted to have extra tension in left upper trapezial area that was tolerable to SOFT TISSUE MOBILIZATION. Plan to continue monitoring symptoms and progressing patient as tolerable.     Izzy Is progressing well towards her goals.   Patient prognosis is Good.     Patient will continue to benefit from skilled outpatient physical therapy to address the deficits listed in the problem list box on initial evaluation, provide patient/family education and to maximize patient's level of independence in the home and community environment.     Patient's spiritual, cultural and educational needs considered and patient agreeable to plan of care and goals.     Anticipated barriers to physical therapy: chronicity of condition, adherence to treatment plan, and occupation     Goals:   Reviewed: 11/18/2024      Short Term Goals:  6 weeks Status  Date Met   PAIN: Pt will report worst pain of 7/10 in order to progress toward max functional ability and improve quality of life. [x] Progressing  [] Met  [] Not Met     FUNCTION: Patient will demonstrate improved function as indicated by a score of greater than or equal to 46 out of 100 on FOTO. [x] Progressing  [] Met  [] Not Met     MOBILITY: Patient will improve AROM to 50% of stated goals, listed in objective measures above, in order to progress towards independence with functional activities.  [x] Progressing  [] Met  [] Not Met     STRENGTH: Patient will improve strength to 50% of stated goals, listed in objective measures above, in order to progress towards independence with functional activities. [x] Progressing  [] Met  [] Not Met     POSTURE: Patient will correct postural deviations in sitting and standing, to decrease pain and promote long term stability.  [x] Progressing  [] Met  [] Not Met     HEP: Patient will demonstrate  independence with HEP in order to progress toward functional independence. [x] Progressing  [] Met  [] Not Met        Long Term Goals:  12 weeks Status Date Met   PAIN: Pt will report worst pain of 4/10 in order to progress toward max functional ability and improve quality of life [x] Progressing  [] Met  [] Not Met     FUNCTION: Patient will demonstrate improved function as indicated by a score of greater than or equal to 51 out of 100 on FOTO. [x] Progressing  [] Met  [] Not Met     MOBILITY: Patient will improve AROM to stated goals, listed in objective measures above, in order to return to maximal functional potential and improve quality of life.  [x] Progressing  [] Met  [] Not Met     STRENGTH: Patient will improve strength to stated goals, listed in objective measures above, in order to improve functional independence and quality of life.  [x] Progressing  [] Met  [] Not Met     Patient will return to normal ADL's, IADL's, community involvement, recreational activities, and work-related activities with less than or equal to 4/10 pain and maximal function.  [x] Progressing  [] Met  [] Not Met          PLAN     Monitor response to today's treatment session and progress with Physical Therapy plan of care as indicated.    Harika Drake, PT

## 2024-11-19 ENCOUNTER — HOSPITAL ENCOUNTER (OUTPATIENT)
Dept: RADIOLOGY | Facility: HOSPITAL | Age: 39
Discharge: HOME | End: 2024-11-19
Payer: COMMERCIAL

## 2024-11-19 ENCOUNTER — PATIENT MESSAGE (OUTPATIENT)
Dept: RESEARCH | Facility: HOSPITAL | Age: 39
End: 2024-11-19
Payer: COMMERCIAL

## 2024-11-19 DIAGNOSIS — M54.2 NECK PAIN, BILATERAL: ICD-10-CM

## 2024-11-19 DIAGNOSIS — R42 DIZZINESS AND GIDDINESS: ICD-10-CM

## 2024-11-19 DIAGNOSIS — S09.90XD INJURY OF HEAD, SUBSEQUENT ENCOUNTER: ICD-10-CM

## 2024-11-19 DIAGNOSIS — R51.9 NONINTRACTABLE HEADACHE, UNSPECIFIED CHRONICITY PATTERN, UNSPECIFIED HEADACHE TYPE: ICD-10-CM

## 2024-11-19 DIAGNOSIS — R41.3 OTHER AMNESIA: ICD-10-CM

## 2024-11-19 PROCEDURE — 70551 MRI BRAIN STEM W/O DYE: CPT | Mod: TC

## 2024-11-19 PROCEDURE — 72141 MRI NECK SPINE W/O DYE: CPT | Mod: 26,,, | Performed by: RADIOLOGY

## 2024-11-19 PROCEDURE — 72141 MRI NECK SPINE W/O DYE: CPT | Mod: TC

## 2024-11-19 PROCEDURE — 70551 MRI BRAIN STEM W/O DYE: CPT | Mod: 26,,, | Performed by: RADIOLOGY

## 2024-11-20 ENCOUNTER — TELEPHONE (OUTPATIENT)
Dept: NEUROLOGY | Facility: CLINIC | Age: 39
End: 2024-11-20
Payer: COMMERCIAL

## 2024-11-20 ENCOUNTER — TELEPHONE (OUTPATIENT)
Dept: INFECTIOUS DISEASES | Facility: CLINIC | Age: 39
End: 2024-11-20
Payer: COMMERCIAL

## 2024-11-20 ENCOUNTER — TELEPHONE (OUTPATIENT)
Dept: PHYSICAL MEDICINE AND REHAB | Facility: CLINIC | Age: 39
End: 2024-11-20
Payer: COMMERCIAL

## 2024-11-20 ENCOUNTER — CLINICAL SUPPORT (OUTPATIENT)
Dept: REHABILITATION | Facility: HOSPITAL | Age: 39
End: 2024-11-20
Payer: COMMERCIAL

## 2024-11-20 ENCOUNTER — OFFICE VISIT (OUTPATIENT)
Dept: INFECTIOUS DISEASES | Facility: CLINIC | Age: 39
End: 2024-11-20
Payer: COMMERCIAL

## 2024-11-20 VITALS
DIASTOLIC BLOOD PRESSURE: 84 MMHG | HEART RATE: 84 BPM | SYSTOLIC BLOOD PRESSURE: 136 MMHG | OXYGEN SATURATION: 100 % | WEIGHT: 171.75 LBS | BODY MASS INDEX: 30.43 KG/M2 | HEIGHT: 63 IN | RESPIRATION RATE: 18 BRPM

## 2024-11-20 DIAGNOSIS — M62.81 PROXIMAL MUSCLE WEAKNESS: ICD-10-CM

## 2024-11-20 DIAGNOSIS — Z74.09 DECREASED FUNCTIONAL MOBILITY AND ENDURANCE: Primary | ICD-10-CM

## 2024-11-20 DIAGNOSIS — A53.9 SERUM POSITIVE FOR TREPONEMA PALLIDUM BY PCR: ICD-10-CM

## 2024-11-20 DIAGNOSIS — M54.12 CERVICAL RADICULOPATHY: Primary | ICD-10-CM

## 2024-11-20 DIAGNOSIS — R29.3 POOR POSTURE: ICD-10-CM

## 2024-11-20 DIAGNOSIS — R29.898 DECREASED RANGE OF MOTION OF NECK: ICD-10-CM

## 2024-11-20 PROCEDURE — 97140 MANUAL THERAPY 1/> REGIONS: CPT

## 2024-11-20 PROCEDURE — 3075F SYST BP GE 130 - 139MM HG: CPT | Mod: CPTII,S$GLB,, | Performed by: STUDENT IN AN ORGANIZED HEALTH CARE EDUCATION/TRAINING PROGRAM

## 2024-11-20 PROCEDURE — 99999 PR PBB SHADOW E&M-EST. PATIENT-LVL IV: CPT | Mod: PBBFAC,,, | Performed by: STUDENT IN AN ORGANIZED HEALTH CARE EDUCATION/TRAINING PROGRAM

## 2024-11-20 PROCEDURE — 3079F DIAST BP 80-89 MM HG: CPT | Mod: CPTII,S$GLB,, | Performed by: STUDENT IN AN ORGANIZED HEALTH CARE EDUCATION/TRAINING PROGRAM

## 2024-11-20 PROCEDURE — 99203 OFFICE O/P NEW LOW 30 MIN: CPT | Mod: S$GLB,,, | Performed by: STUDENT IN AN ORGANIZED HEALTH CARE EDUCATION/TRAINING PROGRAM

## 2024-11-20 PROCEDURE — 1159F MED LIST DOCD IN RCRD: CPT | Mod: CPTII,S$GLB,, | Performed by: STUDENT IN AN ORGANIZED HEALTH CARE EDUCATION/TRAINING PROGRAM

## 2024-11-20 PROCEDURE — 3008F BODY MASS INDEX DOCD: CPT | Mod: CPTII,S$GLB,, | Performed by: STUDENT IN AN ORGANIZED HEALTH CARE EDUCATION/TRAINING PROGRAM

## 2024-11-20 PROCEDURE — 3044F HG A1C LEVEL LT 7.0%: CPT | Mod: CPTII,S$GLB,, | Performed by: STUDENT IN AN ORGANIZED HEALTH CARE EDUCATION/TRAINING PROGRAM

## 2024-11-20 PROCEDURE — 97112 NEUROMUSCULAR REEDUCATION: CPT

## 2024-11-20 PROCEDURE — 97110 THERAPEUTIC EXERCISES: CPT

## 2024-11-20 NOTE — PROGRESS NOTES
"Infectious Disease Clinic Note    Patient Name: Izzy Horner  YOB: 1985    PRESENTING HISTORY       History of Present Illness:  Ms. Izzy Horner is a 39 y.o. female w/ significant PMHx of anemia who presents as referral from neurology due to positive Treponema and FTA with nonreactive RPR. Was seen for headaches. Hit in head by a "machine" at work the size of a long metal pole. Suffered trauma to left side of head and head jerked. Ever since has had chronic headache. Can be as severe as 10/10 on pain scale. Does not occur often. Makes it difficult to be in light. With regard to syphilis, patient does not recall ever receiving IM penicillin. Did receive PO penicillin in the past. Has only lived in [place]. No symptoms including chancre, rashes, fever, or meningismus. Explained that discordant results may be false positives. Would expect high RPR titer if never treated. Will discuss with [place] Dept of Health. For now will rule out neurosyphilis with LP. Patient voiced understanding and is in agreement.     Treponema Pallidum Antibodies (IgG, IgM) Nonreactive Reactive Abnormal    Comment: RPR has been ordered to provide a titer value and  help distinguish between infection with T. pallidum  (syphilis) versus a falsely reactive treponemal  antibody result.   Resulting Agency  [facility]              Narrative  Performed by: [facility]  Release to patient->Immediate  Send normal result to authorizing provider's In Basket if  patient is active on MyChart:->Yes   Specimen Collected: 11/04/24 12:47 CST Last Resulted: 11/04/24 23:10 CST     FTA-ABS Non-reactive Reactive Abnormal    Resulting Agency  [facility]              Narrative  Performed by: [facility]  Release to patient->Immediate  Send normal result to authorizing provider's In Basket if  patient is active on MyChart:->Yes   Specimen Collected: 11/04/24 12:47 CST Last Resulted: 11/07/24 13:19 CST           RPR Non-reactive Non-reactive   Comment: All " reactive syphilis antibody samples automatically undergo an  additional  RPR test in order to enhance diagnostic accuracy.  Discrepancies  between  syphilis antibody and RPR do occur and are resolved by a second  treponemal  test such as FTA.  This is automatically added by the laboratory and  will be  resulted within one week.   Resulting Agency  [facility]              Narrative  Performed by: [facility]  Release to patient->Immediate  Send normal result to authorizing provider's In Basket if  patient is active on MyChart:->Yes   Specimen Collected: 11/04/24 12:47 CST Last Resulted: 11/05/24 13:19 CST       Review of Systems:  Constitutional: no fever or chills  Eyes: no visual changes  ENT: no nasal congestion or sore throat  Respiratory: no cough or shortness of breath  Cardiovascular: no chest pain  Gastrointestinal: no nausea or vomiting, no abdominal pain, no constipation, no diarrhea  Genitourinary: no hematuria or dysuria  Musculoskeletal: no arthralgias or myalgias  Skin: no rash  Neurological: +headaches, can be severe with migraine/aura     The following portions of the patient's history were reviewed and updated as appropriate: allergies, current medications, past family history, past medical history, past social history, past surgical history, and problem list.    PAST HISTORY:     Immunization History   Administered Date(s) Administered    DTP 1985, 07/17/1984, 09/22/1988, 10/02/1989    HIB 09/24/1987    Hepatitis B, Adolescent/High Risk Infant 06/23/1997, 06/29/1998    MMR 09/27/1988, 06/23/1997    OPV 1983, 07/17/1984, 09/27/1988    Td (ADULT) 10/22/2004    Tdap 10/30/2015       Past Medical History:   Diagnosis Date    Anemia     Encounter for blood transfusion        No past surgical history on file.    Family History   Problem Relation Name Age of Onset    Cancer Neg Hx      Diabetes Neg Hx      Heart disease Neg Hx      Hypertension Neg Hx      Kidney disease Neg Hx      Stroke Neg Hx    "      Social History     Socioeconomic History    Marital status: Single   Tobacco Use    Smoking status: Never    Smokeless tobacco: Never   Substance and Sexual Activity    Alcohol use: No    Drug use: No    Sexual activity: Yes     Partners: Male     Birth control/protection: None       MEDICATIONS & ALLERGIES:     Current Outpatient Medications on File Prior to Visit   Medication Sig    ferrous sulfate 325 (65 FE) MG EC tablet Take 1 tablet (325 mg total) by mouth once daily.    ibuprofen (ADVIL,MOTRIN) 800 MG tablet Take 800 mg by mouth every 8 (eight) hours as needed.    topiramate (TOPAMAX) 25 MG tablet Take 1 tablet (25 mg total) by mouth 2 (two) times daily. Week 1: Take 25 mg PO every evening / Week 2: Take 25 mg PO BID as prescribed.    rizatriptan (MAXALT) 10 MG tablet Take 1 tablet (10 mg total) by mouth daily as needed for Migraine. If symptoms persist or return, may repeat dose after 2 hours. Do not exceed more than 2 tablets in 24hr period (Patient not taking: Reported on 11/4/2024)     No current facility-administered medications on file prior to visit.       Review of patient's allergies indicates:  No Known Allergies    OBJECTIVE:   Vital Signs:  Vitals:    11/20/24 0810   BP: 136/84   Pulse: 84   Resp: 18   SpO2: 100%   Weight: 77.9 kg (171 lb 11.8 oz)   Height: 5' 3" (1.6 m)       No results found for this or any previous visit (from the past 24 hours).      Physical Exam:   General:  Well developed, well nourished, no acute distress  HEENT:  Normocephalic, atraumatic, EOMI, clear sclera, throat clear without erythema or exudates  CVS:  RRR, S1 and S2 normal, no murmurs, rubs, gallops  Resp:  Lungs clear to auscultation, no wheezes, rales, rhonchi  MSK:  No muscle atrophy, peripheral edema, full range of motion  Skin:  No rashes, ulcers, erythema  Psych:  Alert and oriented to person, place, and time    ASSESSMENT:     Positive FTA/Treponema   --RPR nonreactive  --Patient denies previous IM " penicillin therapy   --Not familiar with syphilis until this new test result  --Calls into question the accuracy of these results   --Will discuss further with Dept of Health  --For now will rule out neurosyphilis with LP (studies ordered) in setting of severe headache   --Follow up with me based on results       PLAN:     Izzy was seen today for std check.    Diagnoses and all orders for this visit:    Serum positive for Treponema pallidum by PCR  -     Ambulatory consult to Infectious Disease  -     [place] Lumbar Puncture (xpd); Future  -     VDRL, CSF; Future  -     Gram stain; Future  -     CSF culture; Future  -     Protein, CSF; Future  -     Glucose, CSF; Future  -     CSF cell count with differential; Future  -     Meningitis - Encephalitis Panel, CSF; Future          The total time for evaluation and management services performed on 11/20/24 was greater than 35 minutes.        Juvencio Irving, DO   Infectious Diseases

## 2024-11-20 NOTE — PROGRESS NOTES
Good-afternoon Mrs. Horner,     Your Cervical Spine MRI results have been reviewed in detail, and no acute abnormalities were identified. However, the imaging did reveal a disc bulge at the C5-6 level, along with mild narrowing of the right neural foramen. Given your symptoms of neck pain radiating to the left upper extremity, I recommend proceeding with a nerve conduction study. This diagnostic test will help assess for potential nerve involvement and provide further insight into your condition, enabling us to formulate a more effective treatment plan. I will have my nurses help schedule this test for you.     We will discuss results further at your next follow up visit.   Please let me know if you have any questions or concerns.  Have a great day!    NATHAN Tapia

## 2024-11-20 NOTE — TELEPHONE ENCOUNTER
----- Message from Willie Berumen NP sent at 11/20/2024  1:06 PM CST -----  Good-afternoon Mrs. Horner,     Your Cervical Spine MRI results have been reviewed in detail, and no acute abnormalities were identified. However, the imaging did reveal a disc bulge at the C5-6 level, along with mild narrowing of the right neural foramen. Given your symptoms of neck pain radiating to the left upper extremity, I recommend proceeding with a nerve conduction study. This diagnostic test will help assess for potential nerve involvement and provide further insight into your condition, enabling us to formulate a more effective treatment plan. I will have my nurses help schedule this test for you.     We will discuss results further at your next follow up visit.   Please let me know if you have any questions or concerns.  Have a great day!    NATHAN Tapia

## 2024-11-20 NOTE — PROGRESS NOTES
[facility]   Physical Therapy Treatment Note      Name: Izzy Horner  [employer] Number: [id number]    Therapy Diagnosis:   Encounter Diagnoses   Name Primary?    Decreased functional mobility and endurance Yes    Decreased range of motion of neck     Poor posture     Proximal muscle weakness      Physician: Willie Berumen NP    Visit Date: 11/20/2024    Physician Orders: PT Eval and Treat  Medical Diagnosis from Referral: Nonintractable headache, unspecified chronicity pattern, unspecified headache type [R51.9], Neck pain, bilateral [M54.2]   Evaluation Date: 11/15/2024  Authorization Period Expiration: 12/31/2024   Plan of Care Expiration: 2/15/2025  Progress Note Due: 12/15/2024  Visit # / Visits authorized: 2/25 (+1 Evaluation)  FOTO: 1/3 (last performed on 11/15/2024)     Precautions: Standard    Time In: 9:00 AM  Time Out: 9:45 AM  Total Billable Time: 45 minutes    SUBJECTIVE     Patient reports: after last therapy session she felt fine, however, after going to do her MRI patient states she began to have a bad headache again and had to go to sleep early. Patient states today her headache pain is back down.  She was compliant with home exercise program.  Response to previous treatment: good  Functional change: decreased intensity of headaches since initial evaluation     Pain: 0/10  Location: left head    OBJECTIVE     Objective Measures updated at progress report unless specified.     TREATMENT     CPT Intervention Performed  11/20/2024  Duration / Intensity     TE  UPPER BODY ERGOMETER  x  3 minutes forward, 3 minutes backwards    Upper Trapezius Stretch x 3 x 30 seconds bilateral                      NMR  Supine Chin Tucks   2 minutes 3 second holds      Scapular Retractions  x  3 x 10 blue band      Shoulder Extensions x  3 x 10 red band     Seated Bilateral EXTERNAL ROTATION  x  3 x 10 red band      supine scapular retractions  3 minutes 5 second holds, one second off       series 6  Chest stretch  Swimmers  Backwards shoulder rolls  Snow angels   X  X  X  x  2 minutes each    Open books x 2 minutes each bilateral   TA                                                         MT  seated left upper trapezial x  8 minutes   PLAN  UBE   Upper trapezius stretch   Cervical isometrics   Chin tucks   Supine cervical rotations   Cervical retractions   B shoulder ER   Rows   Shoulder extensions             CPT Codes available for Billing:   (08) minutes of Manual therapy (MT) to improve pain and ROM.  (09) minutes of Therapeutic Exercise (TE) to develop strength, endurance, range of motion, and flexibility.  (28) minutes of Neuromuscular Re-Education (NMR)  to improve: Balance, Coordination, Kinesthetic, Sense, Proprioception, and Posture.  (00) minutes of Therapeutic Activities (TA) to improve functional performance.  Unattended Electrical Stimulation (ES) for muscle performance or pain modulation.  BFR: Blood flow restriction applied during exercise  NP or (-): Not Performed    PATIENT EDUCATION AND HOME EXERCISES     Home Exercises Provided and Patient Education Provided     Education provided:    PURPOSE: Patient educated on the impairments noted above and the effects of physical therapy intervention to improve overall condition and QOL.   EXERCISE: Patient was educated on all the above exercise prior/during/after for proper posture, positioning, and execution for safe performance with home exercise program.   STRENGTH: Patient educated on the importance of improved core and extremity strength in order to improve alignment of the spine and extremities with static positions and dynamic movement.     Written Home Exercises Provided: Patient instructed to cont prior HEP. Exercises were reviewed and Izzy was able to demonstrate them prior to the end of the session.  Izzy demonstrated good  understanding of the education provided. See EMR under Patient Instructions for exercises  provided during therapy sessions      ASSESSMENT   Due to patient experiencing some discomfort with chin tucks last visit, these were deferred and thoracic opening exercises were added to which patient tolerated very well. Patient still noted to have palpable trigger points in left upper trapezius that responded well to manual therapy.    Izzy Is progressing well towards her goals.   Patient prognosis is Good.     Patient will continue to benefit from skilled outpatient physical therapy to address the deficits listed in the problem list box on initial evaluation, provide patient/family education and to maximize patient's level of independence in the home and community environment.     Patient's spiritual, cultural and educational needs considered and patient agreeable to plan of care and goals.     Anticipated barriers to physical therapy: chronicity of condition, adherence to treatment plan, and occupation     Goals:   Reviewed: 11/20/2024      Short Term Goals:  6 weeks Status  Date Met   PAIN: Pt will report worst pain of 7/10 in order to progress toward max functional ability and improve quality of life. [x] Progressing  [] Met  [] Not Met     FUNCTION: Patient will demonstrate improved function as indicated by a score of greater than or equal to 46 out of 100 on FOTO. [x] Progressing  [] Met  [] Not Met     MOBILITY: Patient will improve AROM to 50% of stated goals, listed in objective measures above, in order to progress towards independence with functional activities.  [x] Progressing  [] Met  [] Not Met     STRENGTH: Patient will improve strength to 50% of stated goals, listed in objective measures above, in order to progress towards independence with functional activities. [x] Progressing  [] Met  [] Not Met     POSTURE: Patient will correct postural deviations in sitting and standing, to decrease pain and promote long term stability.  [x] Progressing  [] Met  [] Not Met     HEP: Patient will demonstrate  independence with HEP in order to progress toward functional independence. [x] Progressing  [] Met  [] Not Met        Long Term Goals:  12 weeks Status Date Met   PAIN: Pt will report worst pain of 4/10 in order to progress toward max functional ability and improve quality of life [x] Progressing  [] Met  [] Not Met     FUNCTION: Patient will demonstrate improved function as indicated by a score of greater than or equal to 51 out of 100 on FOTO. [x] Progressing  [] Met  [] Not Met     MOBILITY: Patient will improve AROM to stated goals, listed in objective measures above, in order to return to maximal functional potential and improve quality of life.  [x] Progressing  [] Met  [] Not Met     STRENGTH: Patient will improve strength to stated goals, listed in objective measures above, in order to improve functional independence and quality of life.  [x] Progressing  [] Met  [] Not Met     Patient will return to normal ADL's, IADL's, community involvement, recreational activities, and work-related activities with less than or equal to 4/10 pain and maximal function.  [x] Progressing  [] Met  [] Not Met          PLAN     Monitor response to today's treatment session and progress with Physical Therapy plan of care as indicated.    Harika Drake, PT

## 2024-11-20 NOTE — TELEPHONE ENCOUNTER
Spoke with the pt. Notified of appt at [facility] for lumbar puncture at 9am. Informed to fast 8 hours before and she will need a  as well. Pt said thank you and voiced understand. She repeated back as well.

## 2024-11-22 ENCOUNTER — HOSPITAL ENCOUNTER (OUTPATIENT)
Dept: RADIOLOGY | Facility: HOSPITAL | Age: 39
Discharge: HOME | End: 2024-11-22
Attending: STUDENT IN AN ORGANIZED HEALTH CARE EDUCATION/TRAINING PROGRAM
Payer: COMMERCIAL

## 2024-11-22 ENCOUNTER — PATIENT MESSAGE (OUTPATIENT)
Dept: INFECTIOUS DISEASES | Facility: CLINIC | Age: 39
End: 2024-11-22
Payer: COMMERCIAL

## 2024-11-22 DIAGNOSIS — A53.9 SERUM POSITIVE FOR TREPONEMA PALLIDUM BY PCR: ICD-10-CM

## 2024-11-22 DIAGNOSIS — A53.9 SERUM POSITIVE FOR TREPONEMA PALLIDUM BY PCR: Primary | ICD-10-CM

## 2024-11-22 LAB
CLARITY CSF: ABNORMAL
COLOR CSF: COLORLESS
CSF TUBE NUMBER: 2
CSF TUBE NUMBER: 2
GLUCOSE CSF-MCNC: 60 MG/DL (ref 40–70)
PROT CSF-MCNC: 41 MG/DL (ref 15–40)
RBC # CSF: 1262 /CU MM
SPECIMEN VOL CSF: 3 ML
WBC # CSF: 3 /CU MM (ref 0–5)

## 2024-11-22 PROCEDURE — 62328 DX LMBR SPI PNXR W/FLUOR/CT: CPT | Mod: ,,, | Performed by: RADIOLOGY

## 2024-11-22 PROCEDURE — 84157 ASSAY OF PROTEIN OTHER: CPT | Performed by: STUDENT IN AN ORGANIZED HEALTH CARE EDUCATION/TRAINING PROGRAM

## 2024-11-22 PROCEDURE — 62328 DX LMBR SPI PNXR W/FLUOR/CT: CPT

## 2024-11-22 PROCEDURE — 89051 BODY FLUID CELL COUNT: CPT | Performed by: STUDENT IN AN ORGANIZED HEALTH CARE EDUCATION/TRAINING PROGRAM

## 2024-11-22 PROCEDURE — 87070 CULTURE OTHR SPECIMN AEROBIC: CPT | Performed by: STUDENT IN AN ORGANIZED HEALTH CARE EDUCATION/TRAINING PROGRAM

## 2024-11-22 PROCEDURE — 87205 SMEAR GRAM STAIN: CPT | Performed by: STUDENT IN AN ORGANIZED HEALTH CARE EDUCATION/TRAINING PROGRAM

## 2024-11-22 PROCEDURE — 82945 GLUCOSE OTHER FLUID: CPT | Performed by: STUDENT IN AN ORGANIZED HEALTH CARE EDUCATION/TRAINING PROGRAM

## 2024-11-22 PROCEDURE — 86592 SYPHILIS TEST NON-TREP QUAL: CPT | Performed by: STUDENT IN AN ORGANIZED HEALTH CARE EDUCATION/TRAINING PROGRAM

## 2024-11-22 PROCEDURE — 87483 CNS DNA AMP PROBE TYPE 12-25: CPT | Performed by: STUDENT IN AN ORGANIZED HEALTH CARE EDUCATION/TRAINING PROGRAM

## 2024-11-22 NOTE — DISCHARGE SUMMARY
[facility] - Lab [facility] (Hospital)  Discharge Note  Short Stay    FL LUMBAR PUNCTURE DIAGNOSTIC WITH IMAGING      OUTCOME: Patient tolerated treatment/procedure well without complication and is now ready for discharge.    DISPOSITION: Home or Self Care    FINAL DIAGNOSIS:  <principal problem not specified>    FOLLOWUP: In clinic    DISCHARGE INSTRUCTIONS:  No discharge procedures on file.     TIME SPENT ON DISCHARGE: 15 minutes    Pre Op Diagnosis: h/a     Post Op Diagnosis: same     Procedure:  LP     Procedure performed by: Andre CURRY, Dayan CHAVEZ     Written Informed Consent Obtained: Yes     Specimen Removed:  yes     Estimated Blood Loss:  minimal     Findings: Local anesthesia     Sedation:  no     The patient tolerated the procedure well and there were no complications.      Disposition:  F/U in clinic or with ordering physician    Discharge instructions:  Light activity for 24 hours.  Remove band aid in 24 hours.  No baths (showers are appropriate).      Sterile technique was performed in the lower back, lidocaine was used as a local anesthetic.  OP 15cm of h2o, 10 ccs of clear csf to lab (traumatic tap which cleared).  Pt tolerated the procedure well without immediate complications.  Please see radiologist report for details. F/u with PCP and/or ordering physician.

## 2024-11-22 NOTE — LETTER
[facility] ([facility])  Radiology  [id number] [place] [id number]  Phone: [phone]  Fax: [phone]           Patient: Izzy Horner   YOB: 1985   Today's Date: November 22, 2024       To Whom It May Concern:    This notice verifies that Izzy Horner was seen in Radiology on 11/22/2024.         Sincerely,            Jacy Manning SC

## 2024-11-23 LAB
BACTERIA CSF CULT: NORMAL
C GATTII+NEOFOR DNA CSF QL NAA+NON-PROBE: NOT DETECTED
CMV DNA CSF QL NAA+NON-PROBE: NOT DETECTED
E COLI K1 DNA CSF QL NAA+NON-PROBE: NOT DETECTED
EV RNA CSF QL NAA+NON-PROBE: NOT DETECTED
GP B STREP DNA CSF QL NAA+NON-PROBE: NOT DETECTED
GRAM STN SPEC: NORMAL
GRAM STN SPEC: NORMAL
HAEM INFLU DNA CSF QL NAA+NON-PROBE: NOT DETECTED
HAEM INFLU DNA CSF QL NAA+NON-PROBE: NOT DETECTED
HHV6 DNA CSF QL NAA+NON-PROBE: NOT DETECTED
HSV1 DNA CSF QL NAA+NON-PROBE: NOT DETECTED
HSV2 DNA CSF QL NAA+NON-PROBE: NOT DETECTED
N MEN DNA CSF QL NAA+NON-PROBE: NOT DETECTED
PARECHOVIRUS A RNA CSF QL NAA+NON-PROBE: NOT DETECTED
S PNEUM DNA CSF QL NAA+NON-PROBE: NOT DETECTED
VZV DNA CSF QL NAA+NON-PROBE: NOT DETECTED

## 2024-11-25 LAB — VDRL CSF QL: NEGATIVE

## 2024-11-27 ENCOUNTER — CLINICAL SUPPORT (OUTPATIENT)
Dept: REHABILITATION | Facility: HOSPITAL | Age: 39
End: 2024-11-27
Payer: COMMERCIAL

## 2024-11-27 ENCOUNTER — CLINICAL SUPPORT (OUTPATIENT)
Dept: INFECTIOUS DISEASES | Facility: CLINIC | Age: 39
End: 2024-11-27
Payer: COMMERCIAL

## 2024-11-27 DIAGNOSIS — R29.898 DECREASED RANGE OF MOTION OF NECK: ICD-10-CM

## 2024-11-27 DIAGNOSIS — M62.81 PROXIMAL MUSCLE WEAKNESS: ICD-10-CM

## 2024-11-27 DIAGNOSIS — Z74.09 DECREASED FUNCTIONAL MOBILITY AND ENDURANCE: Primary | ICD-10-CM

## 2024-11-27 DIAGNOSIS — R29.3 POOR POSTURE: ICD-10-CM

## 2024-11-27 DIAGNOSIS — A53.9 SERUM POSITIVE FOR TREPONEMA PALLIDUM BY PCR: Primary | ICD-10-CM

## 2024-11-27 LAB
BACTERIA CSF CULT: NO GROWTH
GRAM STN SPEC: NORMAL
GRAM STN SPEC: NORMAL

## 2024-11-27 PROCEDURE — 97112 NEUROMUSCULAR REEDUCATION: CPT

## 2024-11-27 PROCEDURE — 97140 MANUAL THERAPY 1/> REGIONS: CPT

## 2024-11-27 NOTE — PROGRESS NOTES
Patient present in the clinic for Bicillin 2.4 million units per Dr. Irving. This is her first dose out of 3. She tolerated well. Patient waited 15 minutes in the clinic. She was very emotional. I offered patient emotional support and recommend to follow up with her providers and directed. She was discharge without without any complaints.

## 2024-11-27 NOTE — PROGRESS NOTES
[facility]   Physical Therapy Treatment Note      Name: Izzy Horner  [facility] Number: [id number]    Therapy Diagnosis:   Encounter Diagnoses   Name Primary?    Decreased functional mobility and endurance Yes    Decreased range of motion of neck     Poor posture     Proximal muscle weakness      Physician: Willie Berumen NP    Visit Date: 11/27/2024    Physician Orders: PT Eval and Treat  Medical Diagnosis from Referral: Nonintractable headache, unspecified chronicity pattern, unspecified headache type [R51.9], Neck pain, bilateral [M54.2]   Evaluation Date: 11/15/2024  Authorization Period Expiration: 12/31/2024   Plan of Care Expiration: 2/15/2025  Progress Note Due: 12/15/2024  Visit # / Visits authorized: 3/25 (+1 Evaluation)  FOTO: 1/3 (last performed on 11/15/2024)     Precautions: Standard    Time In: 9:00 AM  Time Out: 9:46 AM  Total Billable Time: 46 minutes    SUBJECTIVE     Patient reports: she continues to feel good during her therapy sessions as well as afterwards, however, patient states the other day while she was at work they had her on the floor where there was a lot of noise and patient stated she started to have an intense headache and ended up going to sleep very early into the night.  She was compliant with home exercise program.  Response to previous treatment: good  Functional change: decreased intensity of headaches since initial evaluation     Pain: 0/10  Location: left head    OBJECTIVE     Objective Measures updated at progress report unless specified.     TREATMENT     CPT Intervention Performed  11/27/2024  Duration / Intensity     TE  UPPER BODY ERGOMETER  x  3 minutes forward, 3 minutes backwards    Upper Trapezius Stretch  3 x 30 seconds bilateral                      NMR  Supine Chin Tucks   2 minutes 3 second holds      Scapular Retractions  x  3 x 10 blue band      Shoulder Extensions x  3 x 10 green band     Seated Bilateral EXTERNAL  ROTATION  x  3 x 10 green band      supine scapular retractions  3 minutes 5 second holds, one second off      series 6  Chest stretch  Swimmers  Backwards shoulder rolls  Snow angels   X  X  X  x  2 minutes each    Open books x 2 minutes each bilateral   TA                                                         MT  seated left upper trapezial x  12 minutes   PLAN  UBE   Upper trapezius stretch   Cervical isometrics   Chin tucks   Supine cervical rotations   Cervical retractions   B shoulder ER   Rows   Shoulder extensions             CPT Codes available for Billing:   (12) minutes of Manual therapy (MT) to improve pain and ROM.  (06) minutes of Therapeutic Exercise (TE) to develop strength, endurance, range of motion, and flexibility.  (28) minutes of Neuromuscular Re-Education (NMR)  to improve: Balance, Coordination, Kinesthetic, Sense, Proprioception, and Posture.  (00) minutes of Therapeutic Activities (TA) to improve functional performance.  Unattended Electrical Stimulation (ES) for muscle performance or pain modulation.  BFR: Blood flow restriction applied during exercise  NP or (-): Not Performed    PATIENT EDUCATION AND HOME EXERCISES     Home Exercises Provided and Patient Education Provided     Education provided:    PURPOSE: Patient educated on the impairments noted above and the effects of physical therapy intervention to improve overall condition and QOL.   EXERCISE: Patient was educated on all the above exercise prior/during/after for proper posture, positioning, and execution for safe performance with home exercise program.   STRENGTH: Patient educated on the importance of improved core and extremity strength in order to improve alignment of the spine and extremities with static positions and dynamic movement.     Written Home Exercises Provided: Patient instructed to cont prior HEP. Exercises were reviewed and Izzy was able to demonstrate them prior to the end of the session.  Izzy  demonstrated good  understanding of the education provided. See EMR under Patient Instructions for exercises provided during therapy sessions      ASSESSMENT   Patient continues to tolerate therapy sessions well without any reports of headaches throughout session. Patient encouraged to monitor what may trigger her headaches and to try to tale rest breaks when she feels a headache coming on; patient verbalized understanding.    Izzy Is progressing well towards her goals.   Patient prognosis is Good.     Patient will continue to benefit from skilled outpatient physical therapy to address the deficits listed in the problem list box on initial evaluation, provide patient/family education and to maximize patient's level of independence in the home and community environment.     Patient's spiritual, cultural and educational needs considered and patient agreeable to plan of care and goals.     Anticipated barriers to physical therapy: chronicity of condition, adherence to treatment plan, and occupation     Goals:   Reviewed: 11/27/2024      Short Term Goals:  6 weeks Status  Date Met   PAIN: Pt will report worst pain of 7/10 in order to progress toward max functional ability and improve quality of life. [x] Progressing  [] Met  [] Not Met     FUNCTION: Patient will demonstrate improved function as indicated by a score of greater than or equal to 46 out of 100 on FOTO. [x] Progressing  [] Met  [] Not Met     MOBILITY: Patient will improve AROM to 50% of stated goals, listed in objective measures above, in order to progress towards independence with functional activities.  [x] Progressing  [] Met  [] Not Met     STRENGTH: Patient will improve strength to 50% of stated goals, listed in objective measures above, in order to progress towards independence with functional activities. [x] Progressing  [] Met  [] Not Met     POSTURE: Patient will correct postural deviations in sitting and standing, to decrease pain and promote  long term stability.  [x] Progressing  [] Met  [] Not Met     HEP: Patient will demonstrate independence with HEP in order to progress toward functional independence. [x] Progressing  [] Met  [] Not Met        Long Term Goals:  12 weeks Status Date Met   PAIN: Pt will report worst pain of 4/10 in order to progress toward max functional ability and improve quality of life [x] Progressing  [] Met  [] Not Met     FUNCTION: Patient will demonstrate improved function as indicated by a score of greater than or equal to 51 out of 100 on FOTO. [x] Progressing  [] Met  [] Not Met     MOBILITY: Patient will improve AROM to stated goals, listed in objective measures above, in order to return to maximal functional potential and improve quality of life.  [x] Progressing  [] Met  [] Not Met     STRENGTH: Patient will improve strength to stated goals, listed in objective measures above, in order to improve functional independence and quality of life.  [x] Progressing  [] Met  [] Not Met     Patient will return to normal ADL's, IADL's, community involvement, recreational activities, and work-related activities with less than or equal to 4/10 pain and maximal function.  [x] Progressing  [] Met  [] Not Met          PLAN     Monitor response to today's treatment session and progress with Physical Therapy plan of care as indicated.    Harika Drake, PT

## 2024-12-03 ENCOUNTER — PATIENT MESSAGE (OUTPATIENT)
Dept: NEUROLOGY | Facility: CLINIC | Age: 39
End: 2024-12-03

## 2024-12-03 ENCOUNTER — OFFICE VISIT (OUTPATIENT)
Dept: NEUROLOGY | Facility: CLINIC | Age: 39
End: 2024-12-03
Payer: COMMERCIAL

## 2024-12-03 ENCOUNTER — TELEPHONE (OUTPATIENT)
Dept: NEUROSURGERY | Facility: CLINIC | Age: 39
End: 2024-12-03
Payer: COMMERCIAL

## 2024-12-03 VITALS
OXYGEN SATURATION: 99 % | DIASTOLIC BLOOD PRESSURE: 92 MMHG | HEIGHT: 63 IN | RESPIRATION RATE: 16 BRPM | SYSTOLIC BLOOD PRESSURE: 131 MMHG | BODY MASS INDEX: 30.82 KG/M2 | WEIGHT: 173.94 LBS | HEART RATE: 75 BPM

## 2024-12-03 DIAGNOSIS — M54.12 CERVICAL RADICULOPATHY: ICD-10-CM

## 2024-12-03 DIAGNOSIS — G44.309 POST-CONCUSSION HEADACHE: ICD-10-CM

## 2024-12-03 DIAGNOSIS — F41.9 ANXIETY: ICD-10-CM

## 2024-12-03 DIAGNOSIS — A53.9 SERUM POSITIVE FOR TREPONEMA PALLIDUM BY PCR: ICD-10-CM

## 2024-12-03 DIAGNOSIS — M48.02 NEURAL FORAMINAL STENOSIS OF CERVICAL SPINE: ICD-10-CM

## 2024-12-03 DIAGNOSIS — R41.3 OTHER AMNESIA: ICD-10-CM

## 2024-12-03 DIAGNOSIS — H53.8 BLURRY VISION, BILATERAL: ICD-10-CM

## 2024-12-03 DIAGNOSIS — R55 SPELL OF LOSS OF CONSCIOUSNESS: ICD-10-CM

## 2024-12-03 DIAGNOSIS — M54.2 NECK PAIN, BILATERAL: ICD-10-CM

## 2024-12-03 DIAGNOSIS — S09.90XD INJURY OF HEAD, SUBSEQUENT ENCOUNTER: ICD-10-CM

## 2024-12-03 DIAGNOSIS — H93.13 TINNITUS OF BOTH EARS: ICD-10-CM

## 2024-12-03 DIAGNOSIS — M50.30 BULGING OF CERVICAL INTERVERTEBRAL DISC: ICD-10-CM

## 2024-12-03 DIAGNOSIS — R42 DIZZINESS AND GIDDINESS: ICD-10-CM

## 2024-12-03 DIAGNOSIS — E51.9 VITAMIN B1 DEFICIENCY: ICD-10-CM

## 2024-12-03 DIAGNOSIS — R11.0 NAUSEA: ICD-10-CM

## 2024-12-03 DIAGNOSIS — H53.2 DOUBLE VISION WITH BOTH EYES OPEN: ICD-10-CM

## 2024-12-03 DIAGNOSIS — G43.719 INTRACTABLE CHRONIC MIGRAINE WITHOUT AURA AND WITHOUT STATUS MIGRAINOSUS: Primary | ICD-10-CM

## 2024-12-03 PROCEDURE — 99999 PR PBB SHADOW E&M-EST. PATIENT-LVL V: CPT | Mod: PBBFAC,,,

## 2024-12-03 RX ORDER — IBUPROFEN 800 MG/1
800 TABLET ORAL EVERY 8 HOURS PRN
Qty: 30 TABLET | Refills: 0 | Status: SHIPPED | OUTPATIENT
Start: 2024-12-03 | End: 2025-01-02

## 2024-12-03 NOTE — PROGRESS NOTES
"Subjective:       Patient ID: Izzy Horner is a 39 y.o. female.      Chief Complaint: "Headaches".        HPI    HPI 39 Years old Female with PMHx of anemia  and other medical conditions came for the follow-up evaluation and recommendation of "Headaches".      Interval History: (11/04/2024) - Ordered ENT referral / ophthalmology referral / routine EEG / brain MRI without contrast / MRI cervical spine without contrast / PT therapy / MAGGY / ESR / CRP / HCG / FA / Homocysteine / RPR / B1 / B12. Started Topamax 25 mg PO BID. Continued rizatriptan 10 mg daily p.r.n and ibuprofen 800 mg p.o. every 8 hours p.r.n.       Headache History:    Onset: The patient began experiencing "Headaches" in June 2024 following a head injury sustained at work, where a knob from a paper roll fell off the glider and struck her left supraorbital region per patient. She reports that after the impact, she stumbled to a chair, experiencing dizziness and briefly losing consciousness for a few seconds; however, she managed to sit in the chair. No seizures at the time of head injury. No open wounds or skull fracture at the time of the head injury. No loss of vision or hearing at the time of head injury. No paralysis or weakness at the time of head injury. No vomiting at the time of head injury. The patient was evaluated at [facility], where X-rays and a head CT scan were performed, both of which returned normal results according to her report. Following the incident, she noted residual dizziness and headache pain. One week later, she developed bilateral floaters, bilateral tinnitus, and brain fog per patient.     Description: Headaches are pressure, aching, stabbing, and throbbing-like, building up slowly towards the night and early morning. Headaches do wake them up from sleep. They are progressively worsening and interfering with daily activities.    Timing:  Duration of 3 hours.    Frequency: Intermittent, headaches reported 3 " "times per week, and 5 per month are migraine-like.    Pain Severity: Increasing in severity, rated 8-10/10, causing significant morbidity.    Location: Bilateral frontal and temporal areas / Usually starts behind left supraorbital region and radiates to left generalized lobes ending to occipital area.    Family History: No family history of early dementia or Migraines.     Medications: Advil 800 mg Q8H PRN - not helping     Worsening Factors: None / Denies physical and emotional stressors. No specific food triggers identified.    Alleviating Factors: Dark and quiet room     Associated Symptoms: Bilateral eye pressure, light and sound sensitivity, dizziness she means "light-headed", nausea, does report scalp sensitivity to left occipital area, Neck Pain with radiation to the left upper extremity, bilateral hand numbness / tingling, bilateral blurry vision, double vision, and floaters, bilateral ear ringing, spells of loss of awareness where she stares into space since head injury, brain fog.     Pertinent Negative Symptoms: No other associated neurological deficits    Positional/Behavioral Factors: Headaches are positional and postural, worsened by movement and bending the head downward. Denies worsening with Valsalva maneuver.    Triggers: Bending head in a downward position.     Prodromal Symptoms: No visual aura, irritability, or urinary frequency.    Exclusions: No TMJ issues, seizures, smoking history, caffeine overuse, vertigo, blackouts, fever, chills, or significant memory loss. No history of strokes or falls. No prior history of prior TBI. No premorbid history of anxiety-depression. No history off premorbid alcoholism. No premorbid history of migraine headaches. No premorbid history of neck or back pains.  No symptoms of sleep apnea (e.g., snoring, gasping, frequent nighttime awakening, daytime fatigue).               New Issues: (12/03/2024) -     No new issues per patient.     Medications:  Topamax 25 mg " "PO BID: Patient reports that she has not started taking the medication yet due to a lack of understanding of the directions. The directions were thoroughly discussed with the patient, and she verbalized understanding.  Rizatriptan 10 mg daily p.r.n.: Patient has not taken this medication yet and reports that she never received it from the pharmacy. It was confirmed that the medication was received by the pharmacy, and the patient was informed that the medication is ready to be picked up.  Ibuprofen 800 mg PO every 8 hours p.r.n.: Patient is tolerating this medication well without side effects. She reports taking it as prescribed.    Headaches have slightly improved per patient since last visit. She reports a reduced frequency due to taking Ibuprofen PRN.   Location is Bilateral frontal and temporal areas / Usually starts behind left supraorbital region and radiates to left generalized lobes ending to occipital area.  No Headache auras   Frequency of tension type is 3 times per week and migraine type is 4-5 per month.   Duration of tension type is 3 hours and migraine type is 12 hours.   Still "pressure, aching, stabbing, and throbbing-like" in nature  Pain intensity is still 8-10/10  Worsened by loud noises  Triggered by Bending head in a downward position / loud noises  Alleviated with Dark and quiet room   Associated symptoms include Bilateral eye pressure, light and sound sensitivity, dizziness she means "light-headed", nausea, does report scalp sensitivity to left occipital area, Neck Pain with radiation to the left upper extremity, bilateral hand numbness / tingling, bilateral blurry vision, double vision, and floaters, bilateral ear ringing, spells of loss of awareness where she stares into space since head injury, brain fog, Anxiety.     Patient is requesting a Psychiatry referral for Anxiety management. She denies SI or HI.     Pertinent Negative Symptoms: No other associated neurological deficits, vomiting, " balance issues, acute thunderclap onset, focal or bilateral limb weakness    Patient does report changes in vision. Last eye clinic appointment on 1 year ago, with normal pressures, no papilledema, and no abnormalities reported.     Referrals:     ENT Referral: Patient needs an appointment.  Ophthalmology Referral: Patient needs an appointment.  Physical Therapy (PT): Patient reports that physical therapy is significantly helping to reduce neck tightness and headaches.        Abortive therapies (tried and failed): Ibuprofen 800 mg PO every 8 hours p.r.n. - Current     Advil 800 mg Q8H PRN - not helping     Rizatriptan 10 mg daily p.r.n. Patient reports she has not started taking medication yet.  She is receptive to trialing therapy.     Preventative therapies (tried and failed): None - Patient has not started Topamax yet. She is receptive to trialing therapy.    Pregnancy and birth control: None       Review of Systems   Constitutional:  Negative for activity change, appetite change, chills, diaphoresis, fatigue, fever and unexpected weight change.   HENT:  Positive for tinnitus. Negative for congestion, dental problem, drooling, ear discharge, ear pain, facial swelling, hearing loss, mouth sores, nosebleeds, postnasal drip, rhinorrhea, sinus pressure, sinus pain, sneezing, sore throat, trouble swallowing and voice change.    Eyes:  Positive for photophobia and visual disturbance. Negative for pain, discharge, redness and itching.        Bilateral eye pressure   Respiratory:  Negative for cough, chest tightness, shortness of breath and wheezing.    Cardiovascular:  Negative for chest pain, palpitations and leg swelling.   Gastrointestinal:  Positive for nausea. Negative for abdominal distention, abdominal pain, blood in stool, constipation, diarrhea and vomiting.   Endocrine: Negative for cold intolerance, heat intolerance, polydipsia, polyphagia and polyuria.   Genitourinary:  Negative for decreased urine volume,  difficulty urinating, dysuria, flank pain, frequency, hematuria, pelvic pain, urgency and vaginal discharge.   Musculoskeletal:  Positive for neck pain. Negative for arthralgias, back pain, gait problem, joint swelling, myalgias and neck stiffness.        Neck Pain with radiation to the left upper extremity   Skin:  Negative for color change and rash.   Allergic/Immunologic: Negative for immunocompromised state.   Neurological:  Positive for dizziness, light-headedness, numbness and headaches. Negative for tremors, seizures, syncope, facial asymmetry, speech difficulty and weakness.        Patient does report scalp sensitivity to left occipital area    Patient spells of loss of awareness where she stares into space since head injury   Hematological:  Negative for adenopathy. Does not bruise/bleed easily.   Psychiatric/Behavioral:  Negative for agitation, behavioral problems, confusion, decreased concentration, dysphoric mood, hallucinations, self-injury, sleep disturbance and suicidal ideas. The patient is nervous/anxious. The patient is not hyperactive.    All other systems reviewed and are negative.                Current Outpatient Medications:     ferrous sulfate 325 (65 FE) MG EC tablet, Take 1 tablet (325 mg total) by mouth once daily., Disp: 30 tablet, Rfl: 5    ibuprofen (ADVIL,MOTRIN) 800 MG tablet, Take 800 mg by mouth every 8 (eight) hours as needed., Disp: , Rfl:     rizatriptan (MAXALT) 10 MG tablet, Take 1 tablet (10 mg total) by mouth daily as needed for Migraine. If symptoms persist or return, may repeat dose after 2 hours. Do not exceed more than 2 tablets in 24hr period (Patient not taking: Reported on 11/4/2024), Disp: 10 tablet, Rfl: 0    topiramate (TOPAMAX) 25 MG tablet, Take 1 tablet (25 mg total) by mouth 2 (two) times daily. Week 1: Take 25 mg PO every evening / Week 2: Take 25 mg PO BID as prescribed., Disp: 60 tablet, Rfl: 0    Current Facility-Administered Medications:     penicillin G  benzathine (BICILLIN LA) injection 2.4 Million Units, 2.4 Million Units, Intramuscular, Weekly, , 2.4 Million Units at 11/27/24 1056    Past Medical History:   Diagnosis Date    Anemia     Encounter for blood transfusion        No past surgical history on file.    Social History     Socioeconomic History    Marital status: Single   Tobacco Use    Smoking status: Never    Smokeless tobacco: Never   Substance and Sexual Activity    Alcohol use: No    Drug use: No    Sexual activity: Yes     Partners: Male     Birth control/protection: None         Past/Current Medical/Surgical History, Past/Current Social History, Past/Current Family History and Past/Current Medications were reviewed in detail.    Objective:           VITAL SIGNS WERE REVIEWED      GENERAL APPEARANCE:     The patient looks comfortable.    BMI    No signs of respiratory distress.    Normal breathing pattern.    No dysmorphic features    Normal eye contact.       GENERAL MEDICAL EXAM:    HEENT:  Head is atraumatic normocephalic.     FUNDUSCOPIC (OPHTHALMOSCOPIC) EXAMINATION showed no disc edema (papilledema).      NECK: No JVD. No visible lesions or goiters.     CHEST-CARDIOPULMONARY: No cyanosis. No tachypnea. Normal respiratory effort.    [id number]: No jaundice. No stomas or lesions. No visible hernias. No catheters.     SKIN, HAIR, NAILS: No pathognomonic skin rash.No neurofibromatosis. No visible lesions.No stigmata of autoimmune disease. No clubbing.    LIMBS: No varicose veins. No visible swelling.    MUSCULOSKELETAL: No visible deformities.No visible lesions.             Neurological Exam  Mental Status  Awake, alert and oriented to person, place and time. Oriented to person, place, time and situation. Recent and remote memory are intact. At 5 minutes recalls 3 of 3 objects. Speech is normal. Language is fluent with no aphasia. Attention and concentration are normal. Fund of knowledge is appropriate for level of  education. Apraxia absent.    Cranial Nerves  CN I: Sense of smell is normal.  CN II: Visual acuity is normal. Visual fields full to confrontation. Right funduscopic exam: disc intact. Left funduscopic exam: disc intact.  CN III, IV, VI: Extraocular movements intact bilaterally. Normal lids and orbits bilaterally. Pupils equal round and reactive to light bilaterally.  CN V: Facial sensation is normal.  CN VII: Full and symmetric facial movement.  CN VIII: Hearing is normal.  CN IX, X: Palate elevates symmetrically. Normal gag reflex.  CN XI: Shoulder shrug strength is normal.  CN XII: Tongue midline without atrophy or fasciculations.    Motor  Normal muscle bulk throughout. No fasciculations present. Normal muscle tone. No abnormal involuntary movements. Strength is 5/5 throughout all four extremities.    Sensory  Sensation is intact to light touch, pinprick, vibration and proprioception in all four extremities.    Reflexes                                            Right                      Left  Brachioradialis                    2+                         2+  Biceps                                 2+                         2+  Triceps                                2+                         2+  Finger flex                           2+                         2+  Hamstring                            2+                         2+  Patellar                                2+                         2+  Achilles                                2+                         2+    Coordination  Right: Finger-to-nose normal. Rapid alternating movement normal. Heel-to-shin normal.Left: Finger-to-nose normal. Rapid alternating movement normal. Heel-to-shin normal.    Gait  Casual gait is normal including stance, stride, and arm swing.Normal toe walking. Normal heel walking. Normal tandem gait. Romberg is absent. Normal pull test. Able to rise from chair without using arms.        Lab Results   Component Value Date    WBC  4.91 08/21/2024    HGB 9.3 (L) 08/21/2024    HCT 30.2 (L) 08/21/2024    MCV 78 (L) 08/21/2024     (H) 08/21/2024       Sodium   Date Value Ref Range Status   08/21/2024 138 136 - 145 mmol/L Final     Potassium   Date Value Ref Range Status   08/21/2024 4.3 3.5 - 5.1 mmol/L Final     Chloride   Date Value Ref Range Status   08/21/2024 108 95 - 110 mmol/L Final     CO2   Date Value Ref Range Status   08/21/2024 25 23 - 29 mmol/L Final     Glucose   Date Value Ref Range Status   08/21/2024 93 70 - 110 mg/dL Final     BUN   Date Value Ref Range Status   08/21/2024 11 6 - 20 mg/dL Final     Creatinine   Date Value Ref Range Status   08/21/2024 0.9 0.5 - 1.4 mg/dL Final     Calcium   Date Value Ref Range Status   08/21/2024 9.1 8.7 - 10.5 mg/dL Final     Total Protein   Date Value Ref Range Status   08/21/2024 6.1 6.0 - 8.4 g/dL Final     Albumin   Date Value Ref Range Status   08/21/2024 3.1 (L) 3.5 - 5.2 g/dL Final     Total Bilirubin   Date Value Ref Range Status   08/21/2024 0.3 0.1 - 1.0 mg/dL Final     Comment:     For infants and newborns, interpretation of results should be based  on gestational age, weight and in agreement with clinical  observations.    Premature Infant recommended reference ranges:  Up to 24 hours.............<8.0 mg/dL  Up to 48 hours............<12.0 mg/dL  3-5 days..................<15.0 mg/dL  6-29 days.................<15.0 mg/dL       Alkaline Phosphatase   Date Value Ref Range Status   08/21/2024 74 55 - 135 U/L Final     AST   Date Value Ref Range Status   08/21/2024 19 10 - 40 U/L Final     ALT   Date Value Ref Range Status   08/21/2024 21 10 - 44 U/L Final     Anion Gap   Date Value Ref Range Status   08/21/2024 5 (L) 8 - 16 mmol/L Final       Lab Results   Component Value Date    [id number] 582 11/04/2024       Lab Results   Component Value Date    TSH 0.935 08/21/2024       No results found in the last 24 hours.    No results found in the last 24 hours.    Reviewed the  "neuroimaging independently       Assessment:   39 Years old Female with PMH as above came for an evaluation of "Headaches".    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  Patient's history and physical exam point to rule out seizures, postconcussion headache, migraines, vestibular migraine, BPPV, cervical stenosis.       Intractable chronic migraine without aura and without status migrainosus  / Post-concussion headache / Injury of head, subsequent encounter     -Continue Topamax 25 mg PO BID for headache prevention therapy and seizure prophylaxis.  Side effects discussed.  Patient verbalized understanding.  No past medical history of kidney stones or glaucoma per patient. Patient is receptive to starting medication. No refills needed.     -Continue rizatriptan 10 mg daily p.r.n. for headache abortive therapy as previously prescribed.  Side effects discussed.  Patient verbalized understanding.  Patient reports she has not started taking medication yet.  She is receptive to trialing therapy. No refills needed.     -Continue ibuprofen 800 mg p.o. every 8 hours p.r.n. for headache abortive therapy.  Side effects discussed.  Patient verbalized understanding. Refills sent.     -Continue PT therapy for migraine prevention therapy / vestibular therapy / neck therapy    -Reviewed results of brain MRI without contrast / MAGGY / ESR / CRP / HCG / FA / Homocysteine " / RPR / B1 / B12. Patient verbalized understanding.       Spell of loss of consciousness     -Continue routine EEG to rule out seizure activity.    -No driving policies discussed until seizures are ruled out.  Patient verbalized full understanding.      Double vision with both eyes open / Blurry vision, bilateral     -Continue ophthalmology referral for further evaluation and recommendation of patient's visual symptoms.      Neck pain, bilateral / Cervical radiculopathy / Dizziness and giddiness     -Reviewed results of MRI cervical spine without contrast.  Patient verbalized understanding.      -Continue EMG-NCS - ordered 11/2024 - pending       Nausea     -Offered patient pharmacological therapy for nausea.  She declines at this time and reports it is manageable with lifestyle modifications.    Tinnitus of both ears     -Ordered ENT referral for evaluation of tinnitus today - patient now receptive.       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        1. Nonintractable headache, unspecified chronicity pattern, unspecified headache type  - ibuprofen (ADVIL,MOTRIN) 800 MG tablet; Take 1 tablet (800 mg total) by mouth every 8 (eight) hours as needed for Pain (Headache).  Dispense: 30 tablet; Refill: 0    2. Post-concussion headache    3. Injury of head, subsequent encounter    4. Spell of loss of consciousness    5. Double vision with both eyes open    6. Blurry vision, bilateral    7. Neck pain, bilateral    8. Cervical radiculopathy  - Ambulatory consult to Neurosurgery; Future    9. Dizziness and giddiness    10. Nausea    11. Tinnitus of both ears  - Ambulatory referral/consult  to ENT; Future    12. Other amnesia    13. 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 Bulging of cervical intervertebral disc  - Ambulatory consult to Neurosurgery; Future    15. Neural foraminal stenosis of cervical spine  - Ambulatory consult to Neurosurgery; Future    16. Vitamin B1 deficiency    17. Anxiety  - Ambulatory referral/consult to Psychiatry; Future             LABORATORY EVALUATION    Labs: (2024)  MAGGY / ESR / CRP / HCG / FA / Homocysteine / RPR / B1 / B12 / TSH / CBC / CMP / A1C / HIV / Lipid Panel / Hepatitis C Ab   -personally reviewed -non-significant abnormalities except     Treponema Pallidum Antibodies (IgG, IgM) / FTA-ABS  (Reactive) - Ordered infectious disease referral     B1 (36) - decreased -  Patient started taking last week will re-check levels at next follow up visit.      recommend starting an over-the-counter vitamin B1 supplement, with a suggested intake of 1.1 mg/day for females. In addition to supplementation, discussed that vitamin B1 can be obtained from a variety of dietary sources, including:  · Whole grains and fortified cereals  · Legumes (such as beans and lentils)  · Nuts and seeds  · Pork and fish  · Eggs  · Green vegetables (including spinach and asparagus)    H&H (9.3 / 30.2) Iron & TIBC (18 / 451 ) - Followed by PCP      RADIOLOGY EVALUATION     EMG-NCS - ordered 11/2024 - pending     Routine EEG - ordered 11/2024 - pending     Personally reviewed brain MRI without contrast - done 11/2024 - no acute abnormalities    Personally reviewed MRI cervical spine without contrast - done 11/2024 - C5-6 disc bulge mildly indenting the ventral thecal sac and mild right neural foraminal narrowing.  -Ordered Neurosurgery Referral for further evaluation and recommendation.             NEUROPHYSIOLOGY EVALUATION       PATHOLOGY EVALUATION        NEUROCOGNITIVE AND NEUROPSYCHOLOGY EVALUATION                  MIGRAINE, COMMON, WITHOUT AURA, EPISODIC, HIGH FREQUENCY      EVALUATION     OPHTHALMOLOGY EVALUATION    BRAIN MRI WO FOLLOWED BY LP IF INDICATED         MANAGEMENT       HEADACHE DIARY     DISCUSSED THE THREE-FOLD MANAGEMENT OF MIGRAINE:      LIFESTYLE CHANGES:       Good sleep hygiene  Avoid general triggers like lack of sleep/too much sleep, prolonged sun exposure, excessive screen time and specific triggers based on you own diary   Minimize physical and emotional stress  Smoking avoidance and cessation  Limit caffeine drinks to 1-2 a day   Good hydration   Small frequent meals and avoid skipping meals   Moderate 30-minute-long aerobic exercises 3 times/week. Avoid strenuous exercise         ABORTIVE MEDICATIONS (ACUTE-RESCUE MEDICATIONS):     Should only be taken 2-3 times/week to avoid rebound and overuse headaches.    I-explained to the patient that pain meds especially triptans should NOT be taken daily to avoid Rebound Headache and Overuse Headache.    Take at the ONSET of the headache sumatriptan 100 mg PO  (or other Triptan) in combination with naproxen 500 mg PO or Ibuprofen 800 mg PO for headache without nausea or vomiting.  This regimen can be repeated only once in 24 hours after 2 hours.    Side effects of triptans were discussed and include rare cardiac and cerebral ischemia and cannot be used with migraine associate with focal neurological deficits (complicated migraine) in addition to drowsiness and potential impairment of driving ability. The patient verbalized understanding.    NSAIDs can cause peptic ulcers, renal insufficiency and may increase the risk of cardiovascular diseases.  SEs were discussed with the patient. The patient verbalized understanding.    Triptans have shown to be more effective than Gepatns with more SE/AE.      AVOID NARCOTICS (OPIATES)      1. No randomized controlled study shows pain-free results with opioids in the treatment of migraine.     2. The physiologic consequences of opioid use are adverse, occur quickly, and can be  permanent. Decreased gray matter, release of calcitonin gene-related peptide, dynorphin, and pro-inflammatory peptides, and activation of excitatory glutamate receptors are all associated with opioid exposure.     3. Opioids are pro-nociceptive, prevent reversal of migraine central sensitization, and interfere with triptan effectiveness.     4.Opioids precipitate bad clinical outcomes, especially transformation to daily headache.     5. They cause disease progression, comorbidity, and excessive health care consumption.           NEXT OPTIONS:    Triptans: Sumatriptan (Imitrex), Rizatriptan (Maxalt).    Gepants: Nuretc (rimegepant)75 mg >Ubrelvy (ubrogepant) 100 mg    Ditans: Reyvow (lasmiditan) 100 mg (No driving due to sedation)    Fioricet without codeine with Reglan.      Prednisone with Reglan.      LAST RESORT:     DHE NS Trudhesa (Max 2 a week)     C/I: concomitant use of vasoconstrictors like Triptans, strong CY inhibitors such as HAART PIs (eg, ritonavir, nelfinavir, or indinavir) and Macrolides (eg, erythromycin or clarithromycin), CAD, PVD, Stroke/TIA and Uncontrolled HTN.  Serious SEs include Vasospasm and Fibrosis (chronic use).       IMPENDING STATUS: Prednisone and Vistaril.    STATUS MIGRAINOSUS: ED-Infusion for Status Protocol.        PREVENTATIVE (MORE ACCURATELY MIGRAINE REDUCTION) MEDICATIONS:           Since the patient's headache is very frequent a lengthy discussion about preventative medications was carried out.The patient understands that prevention means DECREASING frequency and severity and NOT elimination.The patient was made aware that any new medication can cause serious allergic reaction.The medication is considered failure only if a therapeutic dose reached and maintained for 6-8 weeks.        HELPFUL SUPPLEMENTS:     Helpful supplements include Co-Q 10, B2, Mg, Feverfew (Dolovent combination) and butterbur (Petadolex)        NEUROPHARMACOLOGY     NEXT OPTIONS:      Topiramate/Topamax (TPM) slow titration to 50 mg BID which can cause mental slowing, transient tingling, kidney stones, weight loss, cleft lip and palate and rarely glaucoma and visual field defects . The patient was encouraged to drink a lot of fluids.     Zonisamide/Zonegran (ZNS) 100-400 mg QHS is a good alternative to TPM in case of SE/AE.     Amitriptyline/Elavil (TCA) slow titration to 100-Age which can cause sleepiness, dry eyes, dry mouth, urinary retention, and rarely cardiac arrhythmias    Propranolol/Inderal  (BB)slow titration to 80 mg BID which can cause low blood pressure, slow heart rate, erectile dysfunction, depression, airway obstruction and heart failure exacerbations. Cannot be used with migraine associate with focal neurological deficits.    Lamotrigine/Lamictal  (LTG)slow titration to 100 mg BID which can cause serious skin rash and rare cardiac arrhythmias. LTG is superior to other therapies for specifically reducing migraine aura.     ANTI-CGRP AGENTS: Qulipta (alogepant) 60 mg QD, Erenumab (Aimovig) 140 mg SQ Pen monthly (Reported cases of Constipation and BP elevation) , Galcanezumab (Emgality) 120 mg SQ Pen monthly after a loading dose of 240 mg  and Fremnezumab (Ajovy) (Ligand Blocker): 225 mg SQ monthly or 675 mg every 3 months     Botox 200 units every 3 months.         LAST RESORT OPTIONS:      Namenda 10 mg BID     Valproic acid/ Depakote         NEUROMODULATION     Cefaly, Relivion, Nerivio and GammaCore (VNS)               WOMEN IN CHILD BEARING PERIOD     All migraine medications are not safe during pregnancy and the patient was made aware of this fact. Any pregnancy should be planned, and medications should be stopped PRIOR to pregnancy planning. Folic acid 1 mg daily was recommended. However, hormonal birth control complicates the management of migraine and can exacerbate migraine. If possible, mechanical contraception should be a better option.         PREGNANCY ISSUES          We had a lengthy discussion about managing migraine in patients who are trying to get pregnant or pregnant.    First, I recommend FA 1 mg QD     PRN medications are not safe. The safest option is Reglan PRN and not to be taken more than 2-3 times/week. Fioricet PRN is an option.     Traditional preventative options are not safe including Botox. Cefaly and Relivion are considered safe, but insurance does not cover it.        SCHOOL AND WORK ACCOMMODATIONS        Allow the patient to wear sunglasses or a cap and switch out fluorescent bulbs.    Allow the patient to arrive 5 minutes later and leave 5 minutes earlier to avoid noisy traffic.    Allow the patient to carry a water bottle and refill as needed.    Allow the patient to snack whenever is needed.    Allow the patient to decrease the computer brightness.    Allow the patient to take breaks as needed and extra time for assignments and deadlines.    Allow the patient to avoid strenuous activity as needed.         CONCUSSION EDUCATION:       Concussion occurs at roughly 90 to 100 g-force, which equates to smashing your skull against a wall at 20 mph.    Cognitive and Physical Rest.    Minimize Stimulation.        If symptoms worsen, new symptoms show up or symptoms persist beyond 2 weeks will get Brain MRI (ASL)    If headache continues beyond 2 weeks start: Amitriptyline/Elavil and monitor for sedation.     If cognitive symptoms continue beyond 2 weeks start: Amantadine 100 mg BID.         RETURN TO SCHOOL/SPORT/EXCERCISE AS LONG AS SYMPTOM FREE     MOST CRITICAL IS THE FIRST 3 MONTHS AND ESPECIALLY FIRST 7-10 DAYS       SCHOOL:     Rest for 7 Days     Half Day School for 3 Days         SPORT/EXERCISE:       Rest for 10 Days     Minimal Aerobic Exercise for 3 Days    Moderate Aerobic Exercise for 3 Days    Non-contact Training for 3 Days     Intermittent Contact for 3 Days    Full Contact          MEDICAL/SURGICAL COMORBIDITIES     All relevant medical  comorbidities noted and managed by primary care physician and medical care team.          HEALTHY LIFESTYLE AND PREVENTATIVE CARE    The patient to adhere to the age-appropriate health maintenance guidelines including screening tests and vaccinations. The patient to adhere to  healthy lifestyle, optimal weight, exercise, healthy diet, good sleep hygiene and avoiding drugs including smoking, alcohol and recreational drugs.    I spent a total of 74 minutes on the day of the visit.This includes face to face time and non-face to face time preparing to see the patient (eg, review of tests), obtaining and/or reviewing separately obtained history, documenting clinical information in the electronic or other health record, independently interpreting results and communicating results to the patient/family/caregiver, or care coordinator.     Please do not hesitate to contact me with any updates, questions or concerns.    No follow-ups on file.    Willie Berumen, MSN, FNP-C    General Neurology

## 2024-12-04 ENCOUNTER — CLINICAL SUPPORT (OUTPATIENT)
Dept: INFECTIOUS DISEASES | Facility: CLINIC | Age: 39
End: 2024-12-04
Payer: COMMERCIAL

## 2024-12-04 ENCOUNTER — CLINICAL SUPPORT (OUTPATIENT)
Dept: REHABILITATION | Facility: HOSPITAL | Age: 39
End: 2024-12-04
Payer: COMMERCIAL

## 2024-12-04 ENCOUNTER — TELEPHONE (OUTPATIENT)
Dept: NEUROSURGERY | Facility: CLINIC | Age: 39
End: 2024-12-04
Payer: COMMERCIAL

## 2024-12-04 VITALS — BODY MASS INDEX: 30.81 KG/M2 | WEIGHT: 173.94 LBS

## 2024-12-04 DIAGNOSIS — A53.0 LATENT SYPHILIS: Primary | ICD-10-CM

## 2024-12-04 DIAGNOSIS — M62.81 PROXIMAL MUSCLE WEAKNESS: ICD-10-CM

## 2024-12-04 DIAGNOSIS — Z74.09 DECREASED FUNCTIONAL MOBILITY AND ENDURANCE: Primary | ICD-10-CM

## 2024-12-04 DIAGNOSIS — R29.898 DECREASED RANGE OF MOTION OF NECK: ICD-10-CM

## 2024-12-04 DIAGNOSIS — R29.3 POOR POSTURE: ICD-10-CM

## 2024-12-04 PROCEDURE — 97110 THERAPEUTIC EXERCISES: CPT

## 2024-12-04 PROCEDURE — 97112 NEUROMUSCULAR REEDUCATION: CPT

## 2024-12-04 PROCEDURE — 99999 PR PBB SHADOW E&M-EST. PATIENT-LVL I: CPT | Mod: PBBFAC,,,

## 2024-12-04 PROCEDURE — 97140 MANUAL THERAPY 1/> REGIONS: CPT

## 2024-12-04 NOTE — TELEPHONE ENCOUNTER
Called patient from [facility] for scheduling. Patient was in PT and states she will call back. No date or time given.  RD

## 2024-12-04 NOTE — PROGRESS NOTES
Pt presented today for number 2 injection of pnc g 2.4 mllion units deep I'm . Given one in each dorsalgluteal muscle and tolerated well. Advised to wait 15 minutes in lobby to ensure no immediate  reaction. Documented administration. Pt thanked me and left.

## 2024-12-06 ENCOUNTER — CLINICAL SUPPORT (OUTPATIENT)
Dept: REHABILITATION | Facility: HOSPITAL | Age: 39
End: 2024-12-06
Payer: COMMERCIAL

## 2024-12-06 DIAGNOSIS — R29.3 POOR POSTURE: ICD-10-CM

## 2024-12-06 DIAGNOSIS — M62.81 PROXIMAL MUSCLE WEAKNESS: ICD-10-CM

## 2024-12-06 DIAGNOSIS — R29.898 DECREASED RANGE OF MOTION OF NECK: ICD-10-CM

## 2024-12-06 DIAGNOSIS — Z74.09 DECREASED FUNCTIONAL MOBILITY AND ENDURANCE: Primary | ICD-10-CM

## 2024-12-06 PROCEDURE — 97140 MANUAL THERAPY 1/> REGIONS: CPT

## 2024-12-06 PROCEDURE — 97110 THERAPEUTIC EXERCISES: CPT

## 2024-12-06 PROCEDURE — 97112 NEUROMUSCULAR REEDUCATION: CPT

## 2024-12-09 NOTE — PROGRESS NOTES
[facility]   Physical Therapy Treatment Note      Name: Izzy Horner  [facility] Number: [id number]    Therapy Diagnosis:   Encounter Diagnoses   Name Primary?    Decreased functional mobility and endurance Yes    Decreased range of motion of neck     Poor posture     Proximal muscle weakness      Physician: Willie Berumen NP    Visit Date: 12/6/2024    Physician Orders: PT Eval and Treat  Medical Diagnosis from Referral: Nonintractable headache, unspecified chronicity pattern, unspecified headache type [R51.9], Neck pain, bilateral [M54.2]   Evaluation Date: 11/15/2024  Authorization Period Expiration: 12/31/2024   Plan of Care Expiration: 2/15/2025  Progress Note Due: 12/15/2024  Visit # / Visits authorized: 5/25 (+1 Evaluation)  FOTO: 1/3 (last performed on 11/15/2024)     Precautions: Standard    Time In: 0900  Time Out: 1003  Total Billable Time: 56 minutes    SUBJECTIVE     Patient reports: She had to take some meds this morning for a headache. States her neck has been feeling much looser overall.     She was compliant with home exercise program.  Response to previous treatment: good  Functional change: decreased intensity of headaches since initial evaluation     Pain: 0/10  Location: left head    OBJECTIVE     Objective Measures updated at progress report unless specified.     TREATMENT     CPT Intervention Performed  Today Duration / Intensity      TE  UPPER BODY ERGOMETER  x  3 minutes forward, 3 minutes backwards     Upper Trapezius Stretch  x 3 x 30 seconds bilateral    NMR  Supine Chin Tucks  x  2 minutes 3 second holds      Scapular Retractions  x  3 x 10 blue band      Shoulder Extensions x  3 x 10 green band      Seated Bilateral EXTERNAL ROTATION  x  3 x 10 green band      supine scapular retractions   3 minutes 5 second holds, one second off      series 6  Chest stretch  Swimmers  Backwards shoulder rolls  Snow angels    X  X  X  x  2 minutes each     Open  books x 2 minutes each bilateral   TA          MT  seated left upper trapezial x  12 minutes   PLAN  UBE   Upper trapezius stretch   Cervical isometrics   Chin tucks   Supine cervical rotations   Cervical retractions   B shoulder ER   Rows   Shoulder extensions              CPT Codes available for Billing:   (17) minutes of Manual therapy (MT) to improve pain and ROM.  (10) minutes of Therapeutic Exercise (TE) to develop strength, endurance, range of motion, and flexibility.  (29) minutes of Neuromuscular Re-Education (NMR)  to improve: Balance, Coordination, Kinesthetic, Sense, Proprioception, and Posture.  (00) minutes of Therapeutic Activities (TA) to improve functional performance.  Unattended Electrical Stimulation (ES) for muscle performance or pain modulation.  BFR: Blood flow restriction applied during exercise  NP or (-): Not Performed              PATIENT EDUCATION AND HOME EXERCISES     Home Exercises Provided and Patient Education Provided     Education provided:    PURPOSE: Patient educated on the impairments noted above and the effects of physical therapy intervention to improve overall condition and QOL.   EXERCISE: Patient was educated on all the above exercise prior/during/after for proper posture, positioning, and execution for safe performance with home exercise program.   STRENGTH: Patient educated on the importance of improved core and extremity strength in order to improve alignment of the spine and extremities with static positions and dynamic movement.     Written Home Exercises Provided: Patient instructed to cont prior HEP. Exercises were reviewed and Izzy was able to demonstrate them prior to the end of the session.  Izzy demonstrated good  understanding of the education provided. See EMR under Patient Instructions for exercises provided during therapy sessions      ASSESSMENT   Patient tolerated session well today.  Decreased muscle tension and tenderness to palpation noted in the  cervical region with manual interventions. Continues to require mild cueing to reduce shoulder hiking with band strengthening interventions    Izzy Is progressing well towards her goals.   Patient prognosis is Good.     Patient will continue to benefit from skilled outpatient physical therapy to address the deficits listed in the problem list box on initial evaluation, provide patient/family education and to maximize patient's level of independence in the home and community environment.     Patient's spiritual, cultural and educational needs considered and patient agreeable to plan of care and goals.     Anticipated barriers to physical therapy: chronicity of condition, adherence to treatment plan, and occupation     Goals:   Reviewed: 12/6/2024      Short Term Goals:  6 weeks Status  Date Met   PAIN: Pt will report worst pain of 7/10 in order to progress toward max functional ability and improve quality of life. [x] Progressing  [] Met  [] Not Met     FUNCTION: Patient will demonstrate improved function as indicated by a score of greater than or equal to 46 out of 100 on FOTO. [x] Progressing  [] Met  [] Not Met     MOBILITY: Patient will improve AROM to 50% of stated goals, listed in objective measures above, in order to progress towards independence with functional activities.  [x] Progressing  [] Met  [] Not Met     STRENGTH: Patient will improve strength to 50% of stated goals, listed in objective measures above, in order to progress towards independence with functional activities. [x] Progressing  [] Met  [] Not Met     POSTURE: Patient will correct postural deviations in sitting and standing, to decrease pain and promote long term stability.  [x] Progressing  [] Met  [] Not Met     HEP: Patient will demonstrate independence with HEP in order to progress toward functional independence. [x] Progressing  [] Met  [] Not Met        Long Term Goals:  12 weeks Status Date Met   PAIN: Pt will report worst pain of  4/10 in order to progress toward max functional ability and improve quality of life [x] Progressing  [] Met  [] Not Met     FUNCTION: Patient will demonstrate improved function as indicated by a score of greater than or equal to 51 out of 100 on FOTO. [x] Progressing  [] Met  [] Not Met     MOBILITY: Patient will improve AROM to stated goals, listed in objective measures above, in order to return to maximal functional potential and improve quality of life.  [x] Progressing  [] Met  [] Not Met     STRENGTH: Patient will improve strength to stated goals, listed in objective measures above, in order to improve functional independence and quality of life.  [x] Progressing  [] Met  [] Not Met     Patient will return to normal ADL's, IADL's, community involvement, recreational activities, and work-related activities with less than or equal to 4/10 pain and maximal function.  [x] Progressing  [] Met  [] Not Met          PLAN     Monitor response to today's treatment session and progress with Physical Therapy plan of care as indicated.    Randa Brady, PT

## 2024-12-09 NOTE — PROGRESS NOTES
[facility]   Physical Therapy Treatment Note      Name: Izzy Horner  [facility] Number: [id number]    Therapy Diagnosis:   Encounter Diagnoses   Name Primary?    Decreased functional mobility and endurance Yes    Decreased range of motion of neck     Poor posture     Proximal muscle weakness      Physician: Willie Berumen NP    Visit Date: 12/4/2024    Physician Orders: PT Eval and Treat  Medical Diagnosis from Referral: Nonintractable headache, unspecified chronicity pattern, unspecified headache type [R51.9], Neck pain, bilateral [M54.2]   Evaluation Date: 11/15/2024  Authorization Period Expiration: 12/31/2024   Plan of Care Expiration: 2/15/2025  Progress Note Due: 12/15/2024  Visit # / Visits authorized: 3/25 (+1 Evaluation)  FOTO: 1/3 (last performed on 11/15/2024)     Precautions: Standard    Time In: 0930  Time Out: 1032  Total Billable Time: 54 minutes    SUBJECTIVE     Patient reports: She is feeling better overall with decreased frequency and intensity of headaches     She was compliant with home exercise program.  Response to previous treatment: good  Functional change: decreased intensity of headaches since initial evaluation     Pain: 0/10  Location: left head    OBJECTIVE     Objective Measures updated at progress report unless specified.     TREATMENT     CPT Intervention Performed  Today Duration / Intensity      TE  UPPER BODY ERGOMETER  x  3 minutes forward, 3 minutes backwards     Upper Trapezius Stretch  x 3 x 30 seconds bilateral    NMR  Supine Chin Tucks  x  2 minutes 3 second holds      Scapular Retractions  x  3 x 10 blue band      Shoulder Extensions x  3 x 10 green band      Seated Bilateral EXTERNAL ROTATION  x  3 x 10 green band      supine scapular retractions   3 minutes 5 second holds, one second off      series 6  Chest stretch  Swimmers  Backwards shoulder rolls  Snow angels    X  X  X  x  2 minutes each     Open books x 2 minutes each  bilateral   TA          MT  seated left upper trapezial x  12 minutes   PLAN  UBE   Upper trapezius stretch   Cervical isometrics   Chin tucks   Supine cervical rotations   Cervical retractions   B shoulder ER   Rows   Shoulder extensions              CPT Codes available for Billing:   (15) minutes of Manual therapy (MT) to improve pain and ROM.  (10) minutes of Therapeutic Exercise (TE) to develop strength, endurance, range of motion, and flexibility.  (29) minutes of Neuromuscular Re-Education (NMR)  to improve: Balance, Coordination, Kinesthetic, Sense, Proprioception, and Posture.  (00) minutes of Therapeutic Activities (TA) to improve functional performance.  Unattended Electrical Stimulation (ES) for muscle performance or pain modulation.  BFR: Blood flow restriction applied during exercise  NP or (-): Not Performed      PATIENT EDUCATION AND HOME EXERCISES     Home Exercises Provided and Patient Education Provided     Education provided:    PURPOSE: Patient educated on the impairments noted above and the effects of physical therapy intervention to improve overall condition and QOL.   EXERCISE: Patient was educated on all the above exercise prior/during/after for proper posture, positioning, and execution for safe performance with home exercise program.   STRENGTH: Patient educated on the importance of improved core and extremity strength in order to improve alignment of the spine and extremities with static positions and dynamic movement.     Written Home Exercises Provided: Patient instructed to cont prior HEP. Exercises were reviewed and Izzy was able to demonstrate them prior to the end of the session.  Izzy demonstrated good  understanding of the education provided. See EMR under Patient Instructions for exercises provided during therapy sessions      ASSESSMENT   Patient tolerated session well today.  Pt notes significant relief of pain and muscle tension following manual interventions. Continue to  incorporated series 6 and open books to improve postural mobility. Mild cueing required to decrease shoulder hiking with scapular strengthening.     Izzy Is progressing well towards her goals.   Patient prognosis is Good.     Patient will continue to benefit from skilled outpatient physical therapy to address the deficits listed in the problem list box on initial evaluation, provide patient/family education and to maximize patient's level of independence in the home and community environment.     Patient's spiritual, cultural and educational needs considered and patient agreeable to plan of care and goals.     Anticipated barriers to physical therapy: chronicity of condition, adherence to treatment plan, and occupation     Goals:   Reviewed: 12/4/2024      Short Term Goals:  6 weeks Status  Date Met   PAIN: Pt will report worst pain of 7/10 in order to progress toward max functional ability and improve quality of life. [x] Progressing  [] Met  [] Not Met     FUNCTION: Patient will demonstrate improved function as indicated by a score of greater than or equal to 46 out of 100 on FOTO. [x] Progressing  [] Met  [] Not Met     MOBILITY: Patient will improve AROM to 50% of stated goals, listed in objective measures above, in order to progress towards independence with functional activities.  [x] Progressing  [] Met  [] Not Met     STRENGTH: Patient will improve strength to 50% of stated goals, listed in objective measures above, in order to progress towards independence with functional activities. [x] Progressing  [] Met  [] Not Met     POSTURE: Patient will correct postural deviations in sitting and standing, to decrease pain and promote long term stability.  [x] Progressing  [] Met  [] Not Met     HEP: Patient will demonstrate independence with HEP in order to progress toward functional independence. [x] Progressing  [] Met  [] Not Met        Long Term Goals:  12 weeks Status Date Met   PAIN: Pt will report worst  pain of 4/10 in order to progress toward max functional ability and improve quality of life [x] Progressing  [] Met  [] Not Met     FUNCTION: Patient will demonstrate improved function as indicated by a score of greater than or equal to 51 out of 100 on FOTO. [x] Progressing  [] Met  [] Not Met     MOBILITY: Patient will improve AROM to stated goals, listed in objective measures above, in order to return to maximal functional potential and improve quality of life.  [x] Progressing  [] Met  [] Not Met     STRENGTH: Patient will improve strength to stated goals, listed in objective measures above, in order to improve functional independence and quality of life.  [x] Progressing  [] Met  [] Not Met     Patient will return to normal ADL's, IADL's, community involvement, recreational activities, and work-related activities with less than or equal to 4/10 pain and maximal function.  [x] Progressing  [] Met  [] Not Met          PLAN     Monitor response to today's treatment session and progress with Physical Therapy plan of care as indicated.    Randa Brady, PT

## 2024-12-11 ENCOUNTER — CLINICAL SUPPORT (OUTPATIENT)
Dept: INFECTIOUS DISEASES | Facility: CLINIC | Age: 39
End: 2024-12-11
Payer: COMMERCIAL

## 2024-12-11 ENCOUNTER — CLINICAL SUPPORT (OUTPATIENT)
Dept: REHABILITATION | Facility: HOSPITAL | Age: 39
End: 2024-12-11
Payer: COMMERCIAL

## 2024-12-11 DIAGNOSIS — Z74.09 DECREASED FUNCTIONAL MOBILITY AND ENDURANCE: Primary | ICD-10-CM

## 2024-12-11 DIAGNOSIS — M62.81 PROXIMAL MUSCLE WEAKNESS: ICD-10-CM

## 2024-12-11 DIAGNOSIS — A53.0 LATENT SYPHILIS: Primary | ICD-10-CM

## 2024-12-11 DIAGNOSIS — R29.3 POOR POSTURE: ICD-10-CM

## 2024-12-11 DIAGNOSIS — R29.898 DECREASED RANGE OF MOTION OF NECK: ICD-10-CM

## 2024-12-11 PROCEDURE — 97140 MANUAL THERAPY 1/> REGIONS: CPT

## 2024-12-11 PROCEDURE — 96372 THER/PROPH/DIAG INJ SC/IM: CPT | Mod: S$GLB,,, | Performed by: STUDENT IN AN ORGANIZED HEALTH CARE EDUCATION/TRAINING PROGRAM

## 2024-12-11 PROCEDURE — 97112 NEUROMUSCULAR REEDUCATION: CPT

## 2024-12-11 PROCEDURE — 97530 THERAPEUTIC ACTIVITIES: CPT

## 2024-12-11 PROCEDURE — 97110 THERAPEUTIC EXERCISES: CPT

## 2024-12-11 NOTE — PROGRESS NOTES
Patient is present today for 3rd Bicillin 2.4 million units. Cleaned area using alcohol prep. IM injection given to the right ventro. Patient tolerated well. No immediate reaction. Advised to wait 15 minutes. She expressed understanding.

## 2024-12-13 NOTE — PROGRESS NOTES
[facility]   Physical Therapy Treatment Note      Name: Izzy Horner  [facility] Number: [id number]    Therapy Diagnosis:   Encounter Diagnoses   Name Primary?    Decreased functional mobility and endurance Yes    Decreased range of motion of neck     Poor posture     Proximal muscle weakness      Physician: Willie Berumen NP    Visit Date: 12/11/2024    Physician Orders: PT Eval and Treat  Medical Diagnosis from Referral: Nonintractable headache, unspecified chronicity pattern, unspecified headache type [R51.9], Neck pain, bilateral [M54.2]   Evaluation Date: 11/15/2024  Authorization Period Expiration: 12/31/2024   Plan of Care Expiration: 2/15/2025  Progress Note Due: 12/15/2024  Visit # / Visits authorized: 6/25 (+1 Evaluation)  FOTO: 1/3 (last performed on 11/15/2024)     Precautions: Standard    Time In: 0930  Time Out: 1029  Total Billable Time: 55 minutes    SUBJECTIVE     Patient reports: She had to miss her appointment earlier this week due to a bad headache. However notes that her headaches have been much less frequent     She was compliant with home exercise program.  Response to previous treatment: good  Functional change: decreased intensity of headaches since initial evaluation     Pain: 0/10  Location: left head    OBJECTIVE     Objective Measures updated at progress report unless specified.     TREATMENT     CPT Intervention Performed  Today Duration / Intensity      TE UBE x 3 minutes forward, 3 minutes backwards     Upper Trapezius Stretch  x 3 x 30 seconds bilateral    NMR Supine Chin Tucks  x 2 minutes 3 second holds     B shoulder ER x  3 x 10 green band     supine scapular retractions   3 minutes 5 second holds, one second off     series 6- half foam  x 1 minutes each     Open books x 2 minutes each bilateral              TA Scapular Retractions  x 3 x 10 blue band     Shoulder Extensions x  3 x 10 green band    MT Soft tissue mobilization  x B  suboccipitals, paraspinals, upper trapezius, temporalis and SCM   PLAN  UBE   Upper trapezius stretch   Cervical isometrics   Chin tucks   Supine cervical rotations   Cervical retractions   B shoulder ER   Rows   Shoulder extensions              CPT Codes available for Billing:   (15) minutes of Manual therapy (MT) to improve pain and ROM.  (10) minutes of Therapeutic Exercise (TE) to develop strength, endurance, range of motion, and flexibility.  (22) minutes of Neuromuscular Re-Education (NMR)  to improve: Balance, Coordination, Kinesthetic, Sense, Proprioception, and Posture.  (08) minutes of Therapeutic Activities (TA) to improve functional performance.  Unattended Electrical Stimulation (ES) for muscle performance or pain modulation.  BFR: Blood flow restriction applied during exercise  NP or (-): Not Performed              PATIENT EDUCATION AND HOME EXERCISES     Home Exercises Provided and Patient Education Provided     Education provided:    PURPOSE: Patient educated on the impairments noted above and the effects of physical therapy intervention to improve overall condition and QOL.   EXERCISE: Patient was educated on all the above exercise prior/during/after for proper posture, positioning, and execution for safe performance with home exercise program.   STRENGTH: Patient educated on the importance of improved core and extremity strength in order to improve alignment of the spine and extremities with static positions and dynamic movement.     Written Home Exercises Provided: Patient instructed to cont prior HEP. Exercises were reviewed and Izzy was able to demonstrate them prior to the end of the session.  Izzy demonstrated good  understanding of the education provided. See EMR under Patient Instructions for exercises provided during therapy sessions      ASSESSMENT   Patient tolerated session well today.  Notes significant decrease in tenderness to palpation with soft tissue mobilization today.  Continue to perform exercises to improve strength and neuromuscular control of the cervical spine and shoulder.     Izzy Is progressing well towards her goals.   Patient prognosis is Good.     Patient will continue to benefit from skilled outpatient physical therapy to address the deficits listed in the problem list box on initial evaluation, provide patient/family education and to maximize patient's level of independence in the home and community environment.     Patient's spiritual, cultural and educational needs considered and patient agreeable to plan of care and goals.     Anticipated barriers to physical therapy: chronicity of condition, adherence to treatment plan, and occupation     Goals:   Reviewed: 12/11/2024      Short Term Goals:  6 weeks Status  Date Met   PAIN: Pt will report worst pain of 7/10 in order to progress toward max functional ability and improve quality of life. [x] Progressing  [] Met  [] Not Met     FUNCTION: Patient will demonstrate improved function as indicated by a score of greater than or equal to 46 out of 100 on FOTO. [x] Progressing  [] Met  [] Not Met     MOBILITY: Patient will improve AROM to 50% of stated goals, listed in objective measures above, in order to progress towards independence with functional activities.  [x] Progressing  [] Met  [] Not Met     STRENGTH: Patient will improve strength to 50% of stated goals, listed in objective measures above, in order to progress towards independence with functional activities. [x] Progressing  [] Met  [] Not Met     POSTURE: Patient will correct postural deviations in sitting and standing, to decrease pain and promote long term stability.  [x] Progressing  [] Met  [] Not Met     HEP: Patient will demonstrate independence with HEP in order to progress toward functional independence. [x] Progressing  [] Met  [] Not Met        Long Term Goals:  12 weeks Status Date Met   PAIN: Pt will report worst pain of 4/10 in order to progress  toward max functional ability and improve quality of life [x] Progressing  [] Met  [] Not Met     FUNCTION: Patient will demonstrate improved function as indicated by a score of greater than or equal to 51 out of 100 on FOTO. [x] Progressing  [] Met  [] Not Met     MOBILITY: Patient will improve AROM to stated goals, listed in objective measures above, in order to return to maximal functional potential and improve quality of life.  [x] Progressing  [] Met  [] Not Met     STRENGTH: Patient will improve strength to stated goals, listed in objective measures above, in order to improve functional independence and quality of life.  [x] Progressing  [] Met  [] Not Met     Patient will return to normal ADL's, IADL's, community involvement, recreational activities, and work-related activities with less than or equal to 4/10 pain and maximal function.  [x] Progressing  [] Met  [] Not Met          PLAN     Monitor response to today's treatment session and progress with Physical Therapy plan of care as indicated.    Randa Brady, PT

## 2024-12-17 ENCOUNTER — OFFICE VISIT (OUTPATIENT)
Dept: OPHTHALMOLOGY | Facility: CLINIC | Age: 39
End: 2024-12-17
Payer: COMMERCIAL

## 2024-12-17 DIAGNOSIS — R51.9 FREQUENT HEADACHES: Primary | ICD-10-CM

## 2024-12-17 DIAGNOSIS — H53.8 BLURRY VISION, BILATERAL: ICD-10-CM

## 2024-12-17 PROCEDURE — 99999 PR PBB SHADOW E&M-EST. PATIENT-LVL III: CPT | Mod: PBBFAC,,, | Performed by: OPTOMETRIST

## 2024-12-17 PROCEDURE — 3044F HG A1C LEVEL LT 7.0%: CPT | Mod: CPTII,S$GLB,, | Performed by: OPTOMETRIST

## 2024-12-17 PROCEDURE — 1159F MED LIST DOCD IN RCRD: CPT | Mod: CPTII,S$GLB,, | Performed by: OPTOMETRIST

## 2024-12-17 PROCEDURE — 92015 DETERMINE REFRACTIVE STATE: CPT | Mod: S$GLB,,, | Performed by: OPTOMETRIST

## 2024-12-17 PROCEDURE — 92004 COMPRE OPH EXAM NEW PT 1/>: CPT | Mod: S$GLB,,, | Performed by: OPTOMETRIST
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
## 2025-01-03 NOTE — PROGRESS NOTES
"Subjective:       Patient ID: Izzy Horner is a 39 y.o. female.      Chief Complaint: "Headaches".        HPI    HPI 39 Years old Female with PMHx of anemia  and other medical conditions came for the follow-up evaluation and recommendation of "Headaches".      Interval History: (11/04/2024) - Ordered ENT referral / ophthalmology referral / routine EEG / brain MRI without contrast / MRI cervical spine without contrast / PT therapy / MAGGY / ESR / CRP / HCG / FA / Homocysteine / RPR / B1 / B12. Started Topamax 25 mg PO BID. Continued rizatriptan 10 mg daily p.r.n and ibuprofen 800 mg p.o. every 8 hours p.r.n.       Headache History:    Onset: The patient began experiencing "Headaches" in June 2024 following a head injury sustained at work, where a knob from a paper roll fell off the glider and struck her left supraorbital region per patient. She reports that after the impact, she stumbled to a chair, experiencing dizziness and briefly losing consciousness for a few seconds; however, she managed to sit in the chair. No seizures at the time of head injury. No open wounds or skull fracture at the time of the head injury. No loss of vision or hearing at the time of head injury. No paralysis or weakness at the time of head injury. No vomiting at the time of head injury. The patient was evaluated at [facility], where X-rays and a head CT scan were performed, both of which returned normal results according to her report. Following the incident, she noted residual dizziness and headache pain. One week later, she developed bilateral floaters, bilateral tinnitus, and brain fog per patient.     Description: Headaches are pressure, aching, stabbing, and throbbing-like, building up slowly towards the night and early morning. Headaches do wake them up from sleep. They are progressively worsening and interfering with daily activities.    Timing:  Duration of 3 hours.    Frequency: Intermittent, headaches reported 3 " "times per week, and 5 per month are migraine-like.    Pain Severity: Increasing in severity, rated 8-10/10, causing significant morbidity.    Location: Bilateral frontal and temporal areas / Usually starts behind left supraorbital region and radiates to left generalized lobes ending to occipital area.    Family History: No family history of early dementia or Migraines.     Medications: Advil 800 mg Q8H PRN - not helping     Worsening Factors: None / Denies physical and emotional stressors. No specific food triggers identified.    Alleviating Factors: Dark and quiet room     Associated Symptoms: Bilateral eye pressure, light and sound sensitivity, dizziness she means "light-headed", nausea, does report scalp sensitivity to left occipital area, Neck Pain with radiation to the left upper extremity, bilateral hand numbness / tingling, bilateral blurry vision, double vision, and floaters, bilateral ear ringing, spells of loss of awareness where she stares into space since head injury, brain fog.     Pertinent Negative Symptoms: No other associated neurological deficits    Positional/Behavioral Factors: Headaches are positional and postural, worsened by movement and bending the head downward. Denies worsening with Valsalva maneuver.    Triggers: Bending head in a downward position.     Prodromal Symptoms: No visual aura, irritability, or urinary frequency.    Exclusions: No TMJ issues, seizures, smoking history, caffeine overuse, vertigo, blackouts, fever, chills, or significant memory loss. No history of strokes or falls. No prior history of prior TBI. No premorbid history of anxiety-depression. No history off premorbid alcoholism. No premorbid history of migraine headaches. No premorbid history of neck or back pains.  No symptoms of sleep apnea (e.g., snoring, gasping, frequent nighttime awakening, daytime fatigue).         Interval History:  (12/03/2024) - Continued Topamax 25 mg PO BID, rizatriptan 10 mg daily p.r.n., " "ibuprofen 800 mg p.o. every 8 hours p.r.n., routine EEG, EMG-NCS, Ophthalmology, ENT,  infectious disease, neurosurgery, Psychiatry, and PT Referrals.     Headaches have slightly improved per patient since last visit. She reports a reduced frequency due to taking Ibuprofen PRN.   Location is Bilateral frontal and temporal areas / Usually starts behind left supraorbital region and radiates to left generalized lobes ending to occipital area.  No Headache auras   Frequency of tension type is 3 times per week and migraine type is 4-5 per month.   Duration of tension type is 3 hours and migraine type is 12 hours.   Still "pressure, aching, stabbing, and throbbing-like" in nature  Pain intensity is still 8-10/10  Worsened by loud noises  Triggered by Bending head in a downward position / loud noises  Alleviated with Dark and quiet room   Associated symptoms include Bilateral eye pressure, light and sound sensitivity, dizziness she means "light-headed", nausea, does report scalp sensitivity to left occipital area, Neck Pain with radiation to the left upper extremity, bilateral hand numbness / tingling, bilateral blurry vision, double vision, and floaters, bilateral ear ringing, spells of loss of awareness where she stares into space since head injury, brain fog, Anxiety.       New Issues: (01/06/2025) -     No new issues per patient.     Medications:  Topamax 25 mg PO BID: Patient reports that she has not started taking the medication - pharmacy did not have it available per patient - she is requesting to send medication to [facility]  Rizatriptan 10 mg daily p.r.n.: Patient has not taken this medication yet and reports that she never received it from the pharmacy. She is requesting to send medication to [facility]  Ibuprofen 800 mg PO every 8 hours p.r.n.: Patient is tolerating this medication well without side effects. She reports taking it as prescribed.    Headaches have slightly " "improved per patient since last visit. She reports a reduced frequency due to taking Ibuprofen PRN and PT.   Location is Bilateral frontal and temporal areas / Usually starts behind left supraorbital region and radiates to left generalized lobes ending to occipital area.  No Headache auras   Frequency of tension type is 3 times per week and migraine type is 4-5 per month.   Duration of tension type is 3 hours and migraine type is 12 hours.   Still "pressure, aching, stabbing, and throbbing-like" in nature  Pain intensity is still 8-10/10  Worsened by loud noises  Triggered by Bending head in a downward position / loud noises  Alleviated with Dark and quiet room     Associated symptoms include Bilateral eye pressure, light and sound sensitivity, dizziness she means "light-headed", nausea, does report scalp sensitivity to left occipital area, Neck Pain with radiation to the left upper extremity, bilateral hand numbness / tingling, bilateral blurry vision, double vision, and floaters, bilateral ear ringing, spells of loss of awareness where she stares into space since head injury, brain fog, Anxiety.     Pertinent Negative Symptoms: No other associated neurological deficits, vomiting, balance issues, acute thunderclap onset, focal or bilateral limb weakness      Referrals:  ENT Referral: Pending evaluation, Order   Ophthalmology Referral: Patient was seen in 2024, and no acute abnormalities were reported. Examination revealed normal intraocular pressures, no papilledema, and no other abnormalities.  Physical Therapy (PT): Patient reports significant improvement in neck tightness and headaches with physical therapy and is requesting to resume PT. Order   Neurosurgery: Pending evaluation for cervical radiculopathy. Order   Infectious Disease: Patient was seen in 2024 and received a dose of SD Penicillin G Benzathine injection, 100,000 units.  Psychiatry: Pending evaluation, with an upcoming " appointment in 2025.  EMG-NCS for Cervical Radiculopathy - Order     Abortive therapies (tried and failed): Ibuprofen 800 mg PO every 8 hours p.r.n. - Current     Advil 800 mg Q8H PRN - not helping     Rizatriptan 10 mg daily p.r.n. Patient reports she has not started taking medication yet.  She is receptive to trialing therapy.     Preventative therapies (tried and failed): None - Patient has not started Topamax yet. She is receptive to trialing therapy.    Pregnancy and birth control: None       Review of Systems   Constitutional:  Negative for activity change, appetite change, chills, diaphoresis, fatigue, fever and unexpected weight change.   HENT:  Positive for tinnitus. Negative for congestion, dental problem, drooling, ear discharge, ear pain, facial swelling, hearing loss, mouth sores, nosebleeds, postnasal drip, rhinorrhea, sinus pressure, sinus pain, sneezing, sore throat, trouble swallowing and voice change.    Eyes:  Positive for photophobia and visual disturbance. Negative for pain, discharge, redness and itching.        Bilateral eye pressure   Respiratory:  Negative for cough, chest tightness, shortness of breath and wheezing.    Cardiovascular:  Negative for chest pain, palpitations and leg swelling.   Gastrointestinal:  Positive for nausea. Negative for abdominal distention, abdominal pain, blood in stool, constipation, diarrhea and vomiting.   Endocrine: Negative for cold intolerance, heat intolerance, polydipsia, polyphagia and polyuria.   Genitourinary:  Negative for decreased urine volume, difficulty urinating, dysuria, flank pain, frequency, hematuria, pelvic pain, urgency and vaginal discharge.   Musculoskeletal:  Positive for neck pain. Negative for arthralgias, back pain, gait problem, joint swelling, myalgias and neck stiffness.        Neck Pain with radiation to the left upper extremity   Skin:  Negative for color change and rash.   Allergic/Immunologic: Negative for  immunocompromised state.   Neurological:  Positive for dizziness, light-headedness, numbness and headaches. Negative for tremors, seizures, syncope, facial asymmetry, speech difficulty and weakness.        Patient does report scalp sensitivity to left occipital area    Patient spells of loss of awareness where she stares into space since head injury   Hematological:  Negative for adenopathy. Does not bruise/bleed easily.   Psychiatric/Behavioral:  Negative for agitation, behavioral problems, confusion, decreased concentration, dysphoric mood, hallucinations, self-injury, sleep disturbance and suicidal ideas. The patient is nervous/anxious. The patient is not hyperactive.    All other systems reviewed and are negative.                Current Outpatient Medications:     ferrous sulfate 325 (65 FE) MG EC tablet, Take 1 tablet (325 mg total) by mouth once daily., Disp: 30 tablet, Rfl: 5    rizatriptan (MAXALT) 10 MG tablet, Take 1 tablet (10 mg total) by mouth daily as needed for Migraine. If symptoms persist or return, may repeat dose after 2 hours. Do not exceed more than 2 tablets in 24hr period (Patient not taking: Reported on 12/3/2024), Disp: 10 tablet, Rfl: 0    topiramate (TOPAMAX) 25 MG tablet, Take 1 tablet (25 mg total) by mouth 2 (two) times daily. Week 1: Take 25 mg PO every evening / Week 2: Take 25 mg PO BID as prescribed., Disp: 60 tablet, Rfl: 0    Past Medical History:   Diagnosis Date    Anemia     Encounter for blood transfusion        No past surgical history on file.    Social History     Socioeconomic History    Marital status: Single   Tobacco Use    Smoking status: Never    Smokeless tobacco: Never   Substance and Sexual Activity    Alcohol use: No     Comment: OCC    Drug use: No    Sexual activity: Yes     Partners: Male     Birth control/protection: None         Past/Current Medical/Surgical History, Past/Current Social History, Past/Current Family History and Past/Current Medications were  reviewed in detail.    Objective:           VITAL SIGNS WERE REVIEWED      GENERAL APPEARANCE:     The patient looks comfortable.    BMI    No signs of respiratory distress.    Normal breathing pattern.    No dysmorphic features    Normal eye contact.       GENERAL MEDICAL EXAM:    HEENT:  Head is atraumatic normocephalic.     FUNDUSCOPIC (OPHTHALMOSCOPIC) EXAMINATION showed no disc edema (papilledema).      NECK: No JVD. No visible lesions or goiters.     CHEST-CARDIOPULMONARY: No cyanosis. No tachypnea. Normal respiratory effort.    [id number]: No jaundice. No stomas or lesions. No visible hernias. No catheters.     SKIN, HAIR, NAILS: No pathognomonic skin rash.No neurofibromatosis. No visible lesions.No stigmata of autoimmune disease. No clubbing.    LIMBS: No varicose veins. No visible swelling.    MUSCULOSKELETAL: No visible deformities.No visible lesions.             Neurological Exam  Mental Status  Awake, alert and oriented to person, place and time. Oriented to person, place, time and situation. Recent and remote memory are intact. At 5 minutes recalls 3 of 3 objects. Speech is normal. Language is fluent with no aphasia. Attention and concentration are normal. Fund of knowledge is appropriate for level of education. Apraxia absent.    Cranial Nerves  CN I: Sense of smell is normal.  CN II: Visual acuity is normal. Visual fields full to confrontation. Right funduscopic exam: disc intact. Left funduscopic exam: disc intact.  CN III, IV, VI: Extraocular movements intact bilaterally. Normal lids and orbits bilaterally. Pupils equal round and reactive to light bilaterally.  CN V: Facial sensation is normal.  CN VII: Full and symmetric facial movement.  CN VIII: Hearing is normal.  CN IX, X: Palate elevates symmetrically. Normal gag reflex.  CN XI: Shoulder shrug strength is normal.  CN XII: Tongue midline without atrophy or fasciculations.    Motor  Normal muscle bulk throughout. No  fasciculations present. Normal muscle tone. No abnormal involuntary movements. Strength is 5/5 throughout all four extremities.    Sensory  Sensation is intact to light touch, pinprick, vibration and proprioception in all four extremities.    Reflexes                                            Right                      Left  Brachioradialis                    2+                         2+  Biceps                                 2+                         2+  Triceps                                2+                         2+  Finger flex                           2+                         2+  Hamstring                            2+                         2+  Patellar                                2+                         2+  Achilles                                2+                         2+    Coordination  Right: Finger-to-nose normal. Rapid alternating movement normal. Heel-to-shin normal.Left: Finger-to-nose normal. Rapid alternating movement normal. Heel-to-shin normal.    Gait  Casual gait is normal including stance, stride, and arm swing.Normal toe walking. Normal heel walking. Normal tandem gait. Romberg is absent. Normal pull test. Able to rise from chair without using arms.        Lab Results   Component Value Date    WBC 4.91 08/21/2024    HGB 9.3 (L) 08/21/2024    HCT 30.2 (L) 08/21/2024    MCV 78 (L) 08/21/2024     (H) 08/21/2024       Sodium   Date Value Ref Range Status   08/21/2024 138 136 - 145 mmol/L Final     Potassium   Date Value Ref Range Status   08/21/2024 4.3 3.5 - 5.1 mmol/L Final     Chloride   Date Value Ref Range Status   08/21/2024 108 95 - 110 mmol/L Final     CO2   Date Value Ref Range Status   08/21/2024 25 23 - 29 mmol/L Final     Glucose   Date Value Ref Range Status   08/21/2024 93 70 - 110 mg/dL Final     BUN   Date Value Ref Range Status   08/21/2024 11 6 - 20 mg/dL Final     Creatinine   Date Value Ref Range Status   08/21/2024 0.9 0.5 - 1.4 mg/dL Final  "    Calcium   Date Value Ref Range Status   08/21/2024 9.1 8.7 - 10.5 mg/dL Final     Total Protein   Date Value Ref Range Status   08/21/2024 6.1 6.0 - 8.4 g/dL Final     Albumin   Date Value Ref Range Status   08/21/2024 3.1 (L) 3.5 - 5.2 g/dL Final     Total Bilirubin   Date Value Ref Range Status   08/21/2024 0.3 0.1 - 1.0 mg/dL Final     Comment:     For infants and newborns, interpretation of results should be based  on gestational age, weight and in agreement with clinical  observations.    Premature Infant recommended reference ranges:  Up to 24 hours.............<8.0 mg/dL  Up to 48 hours............<12.0 mg/dL  3-5 days..................<15.0 mg/dL  6-29 days.................<15.0 mg/dL       Alkaline Phosphatase   Date Value Ref Range Status   08/21/2024 74 55 - 135 U/L Final     AST   Date Value Ref Range Status   08/21/2024 19 10 - 40 U/L Final     ALT   Date Value Ref Range Status   08/21/2024 21 10 - 44 U/L Final     Anion Gap   Date Value Ref Range Status   08/21/2024 5 (L) 8 - 16 mmol/L Final       Lab Results   Component Value Date    QTHXRQDL60 582 11/04/2024       Lab Results   Component Value Date    TSH 0.935 08/21/2024       No results found in the last 24 hours.    No results found in the last 24 hours.    Reviewed the neuroimaging independently       Assessment:   39 Years old Female with PMH as above came for an evaluation of "Headaches".    1. Intractable chronic migraine without aura and without status migrainosus  Ambulatory referral/consult to Physical/Occupational Therapy    topiramate (TOPAMAX) 25 MG tablet    rizatriptan (MAXALT) 10 MG tablet      2. Post-concussion headache        3. Injury of head, subsequent encounter        4. Spell of loss of consciousness        5. Double vision with both eyes open        6. Blurry vision, bilateral        7. Neck pain, bilateral        8. Cervical radiculopathy  Ambulatory consult to Neurosurgery    EMG W/ ULTRASOUND AND NERVE CONDUCTION TEST " 2 Extremities      9. Dizziness and giddiness  Ambulatory referral/consult to Physical/Occupational Therapy      10. Nausea  ondansetron (ZOFRAN-ODT) 4 MG TbDL      11. Tinnitus of both ears  Ambulatory referral/consult to ENT      12. Other amnesia        13. 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 Bulging of cervical intervertebral disc  Ambulatory consult to Neurosurgery      15. Neural foraminal stenosis of cervical spine  Ambulatory consult to Neurosurgery      16. Vitamin B1 deficiency        17. Anxiety             Plan:   Patient Neurological Assessment is non-focal.  Patient's history and physical exam point to rule out seizures, postconcussion headache, migraines, vestibular migraine, BPPV, cervical stenosis.       Intractable chronic migraine without aura and without status migrainosus  / Post-concussion headache / Injury of head, subsequent encounter     -Continue Topamax 25 mg PO BID for headache prevention therapy and seizure prophylaxis.  Side effects discussed.  Patient verbalized understanding.  No past medical history of kidney stones or glaucoma per patient. Patient is receptive to starting medication. No refills needed.     -Continue rizatriptan 10 mg daily p.r.n. for headache abortive therapy as previously prescribed.  Side effects discussed.  Patient verbalized understanding.  Patient reports she has not started taking medication yet.  She is receptive to trialing therapy. No refills needed.     -Continue ibuprofen 800 mg p.o. every 8 hours p.r.n. for headache abortive therapy.  Side effects discussed.  Patient verbalized understanding. Refills sent.     -Continue PT therapy for migraine prevention therapy / vestibular therapy / neck therapy    -Reviewed results of brain MRI without contrast / MAGGY / ESR / CRP / HCG / FA / Homocysteine / RPR / B1 / B12. Patient verbalized understanding.       Spell of loss of consciousness     -Continue routine EEG to rule out seizure activity.      -No driving policies discussed until seizures are ruled out.  Patient verbalized full understanding.      Double vision with both eyes open / Blurry vision, bilateral     -Continue ophthalmology referral for further evaluation and recommendation of patient's visual symptoms.      Neck pain, bilateral / Cervical radiculopathy / Dizziness and giddiness     -Reviewed results of MRI cervical spine without contrast.  Patient verbalized understanding.      -Continue EMG-NCS - ordered 11/2024 and 01/2025- pending       Nausea     -Start Zofran -ODT PRN.     Tinnitus of both ears     -Continue ENT referral for evaluation of tinnitus today - patient now receptive.       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        1. Intractable chronic migraine without aura and without status migrainosus  - Ambulatory referral/consult to Physical/Occupational Therapy; Future  - topiramate (TOPAMAX) 25 MG tablet; Take 1 tablet (25 mg total) by mouth 2 (two) times daily. Week 1: Take 1 tablet every evening then increase twice daily thereafter  Dispense: 60 tablet; Refill: 0  - rizatriptan (MAXALT) 10 MG tablet; Take 1 tablet (10 mg total) by mouth daily as needed for Migraine. If symptoms persist or return, may repeat dose after 2 hours. Do not exceed more than 2 tablets in 24hr period  Dispense: 18 tablet; Refill: 0    2. Post-concussion headache    3. Injury of head, subsequent encounter    4. Spell of loss of consciousness    5. Double vision with both eyes open    6. Blurry vision, bilateral    7. Neck pain, bilateral    8. Cervical radiculopathy  - Ambulatory consult to Neurosurgery; Future  -  EMG W/ ULTRASOUND AND NERVE CONDUCTION TEST 2 Extremities; Future    9. Dizziness and giddiness  - Ambulatory referral/consult to Physical/Occupational Therapy; Future    10. Nausea  - ondansetron (ZOFRAN-ODT) 4 MG TbDL; Take 1 tablet (4 mg total) by mouth every 8 (eight) hours as needed (Nausea).  Dispense: 30 tablet; Refill: 0    11. Tinnitus of both ears  - Ambulatory referral/consult to ENT; Future    12. Other amnesia    13. 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 Bulging of cervical intervertebral disc  - Ambulatory consult to Neurosurgery; Future    15. Neural foraminal stenosis of cervical spine  - Ambulatory consult to Neurosurgery; Future    16. Vitamin B1 deficiency    17. Anxiety              LABORATORY EVALUATION    Labs: (2024)  MAGGY / ESR / CRP / HCG / FA / Homocysteine / RPR / B1 / B12 / TSH / CBC / CMP / A1C / HIV / Lipid Panel / Hepatitis C Ab   -personally reviewed -non-significant abnormalities except     Treponema Pallidum Antibodies (IgG, IgM) / FTA-ABS  (Reactive) - Ordered infectious disease referral     B1 (36) - decreased -  Patient never started B1 supplement / will re-check levels at her follow-up visit.     recommend starting an over-the-counter vitamin B1 supplement, with a suggested intake of 1.1 mg/day for females. In addition to supplementation, discussed that vitamin B1 can be obtained from a variety of dietary sources, including:  · Whole grains and fortified cereals  · Legumes (such as beans and lentils)  · Nuts and seeds  · Pork and fish  · Eggs  · Green vegetables (including spinach and asparagus)    H&H (9.3 / 30.2) Iron & TIBC (18 / 451 ) - Followed by PCP      RADIOLOGY EVALUATION     Lumbar Puncture - done 11/2024 - Opening pressure: 15 cm H2O - no acute abnormalities     EMG-NCS - ordered 11/2024 - pending     Routine EEG - ordered 11/2024 - pending     Personally reviewed brain MRI without contrast - done 11/2024 - no acute abnormalities    Personally reviewed  MRI cervical spine without contrast - done 11/2024 - C5-6 disc bulge mildly indenting the ventral thecal sac and mild right neural foraminal narrowing.  -Ordered Neurosurgery Referral for further evaluation and recommendation.             NEUROPHYSIOLOGY EVALUATION       PATHOLOGY EVALUATION        NEUROCOGNITIVE AND NEUROPSYCHOLOGY EVALUATION                  MIGRAINE, COMMON, WITHOUT AURA, EPISODIC, HIGH FREQUENCY     EVALUATION     OPHTHALMOLOGY EVALUATION    BRAIN MRI WO FOLLOWED BY LP IF INDICATED         MANAGEMENT       HEADACHE DIARY     DISCUSSED THE THREE-FOLD MANAGEMENT OF MIGRAINE:      LIFESTYLE CHANGES:       Good sleep hygiene  Avoid general triggers like lack of sleep/too much sleep, prolonged sun exposure, excessive screen time and specific triggers based on you own diary   Minimize physical and emotional stress  Smoking avoidance and cessation  Limit caffeine drinks to 1-2 a day   Good hydration   Small frequent meals and avoid skipping meals   Moderate 30-minute-long aerobic exercises 3 times/week. Avoid strenuous exercise         ABORTIVE MEDICATIONS (ACUTE-RESCUE MEDICATIONS):     Should only be taken 2-3 times/week to avoid rebound and overuse headaches.    I-explained to the patient that pain meds especially triptans should NOT be taken daily to avoid Rebound Headache and Overuse Headache.    Take at the ONSET of the headache sumatriptan 100 mg PO  (or other Triptan) in combination with naproxen 500 mg PO or Ibuprofen 800 mg PO for headache without nausea or vomiting.  This regimen can be repeated only once in 24 hours after 2 hours.    Side effects of triptans were discussed and include rare cardiac and cerebral ischemia and cannot be used with migraine associate with focal neurological deficits (complicated migraine) in addition to drowsiness and potential impairment of driving ability. The patient verbalized understanding.    NSAIDs can cause peptic ulcers, renal insufficiency and may  increase the risk of cardiovascular diseases.  SEs were discussed with the patient. The patient verbalized understanding.    Triptans have shown to be more effective than Gepatns with more SE/AE.      AVOID NARCOTICS (OPIATES)      1. No randomized controlled study shows pain-free results with opioids in the treatment of migraine.     2. The physiologic consequences of opioid use are adverse, occur quickly, and can be permanent. Decreased gray matter, release of calcitonin gene-related peptide, dynorphin, and pro-inflammatory peptides, and activation of excitatory glutamate receptors are all associated with opioid exposure.     3. Opioids are pro-nociceptive, prevent reversal of migraine central sensitization, and interfere with triptan effectiveness.     4.Opioids precipitate bad clinical outcomes, especially transformation to daily headache.     5. They cause disease progression, comorbidity, and excessive health care consumption.           NEXT OPTIONS:    Triptans: Sumatriptan (Imitrex), Rizatriptan (Maxalt).    Gepants: Nuretc (rimegepant)75 mg >Ubrelvy (ubrogepant) 100 mg    Ditans: Reyvow (lasmiditan) 100 mg (No driving due to sedation)    Fioricet without codeine with Reglan.      Prednisone with Reglan.      LAST RESORT:     DHE NS Trudhesa (Max 2 a week)     C/I: concomitant use of vasoconstrictors like Triptans, strong CY inhibitors such as HAART PIs (eg, ritonavir, nelfinavir, or indinavir) and Macrolides (eg, erythromycin or clarithromycin), CAD, PVD, Stroke/TIA and Uncontrolled HTN.  Serious SEs include Vasospasm and Fibrosis (chronic use).       IMPENDING STATUS: Prednisone and Vistaril.    STATUS MIGRAINOSUS: ED-Infusion for Status Protocol.        PREVENTATIVE (MORE ACCURATELY MIGRAINE REDUCTION) MEDICATIONS:           Since the patient's headache is very frequent a lengthy discussion about preventative medications was carried out.The patient understands that prevention means DECREASING  frequency and severity and NOT elimination.The patient was made aware that any new medication can cause serious allergic reaction.The medication is considered failure only if a therapeutic dose reached and maintained for 6-8 weeks.        HELPFUL SUPPLEMENTS:     Helpful supplements include Co-Q 10, B2, Mg, Feverfew (Dolovent combination) and butterbur (Petadolex)        NEUROPHARMACOLOGY     NEXT OPTIONS:     Topiramate/Topamax (TPM) slow titration to 50 mg BID which can cause mental slowing, transient tingling, kidney stones, weight loss, cleft lip and palate and rarely glaucoma and visual field defects . The patient was encouraged to drink a lot of fluids.     Zonisamide/Zonegran (ZNS) 100-400 mg QHS is a good alternative to TPM in case of SE/AE.     Amitriptyline/Elavil (TCA) slow titration to 100-Age which can cause sleepiness, dry eyes, dry mouth, urinary retention, and rarely cardiac arrhythmias    Propranolol/Inderal  (BB)slow titration to 80 mg BID which can cause low blood pressure, slow heart rate, erectile dysfunction, depression, airway obstruction and heart failure exacerbations. Cannot be used with migraine associate with focal neurological deficits.    Lamotrigine/Lamictal  (LTG)slow titration to 100 mg BID which can cause serious skin rash and rare cardiac arrhythmias. LTG is superior to other therapies for specifically reducing migraine aura.     ANTI-CGRP AGENTS: Qulipta (alogepant) 60 mg QD, Erenumab (Aimovig) 140 mg SQ Pen monthly (Reported cases of Constipation and BP elevation) , Galcanezumab (Emgality) 120 mg SQ Pen monthly after a loading dose of 240 mg  and Fremnezumab (Ajovy) (Ligand Blocker): 225 mg SQ monthly or 675 mg every 3 months     Botox 200 units every 3 months.         LAST RESORT OPTIONS:      Namenda 10 mg BID     Valproic acid/ Depakote         NEUROMODULATION     Cefaly, Relivion, Nerivio and GammaCore (VNS)               WOMEN IN CHILD BEARING PERIOD     All migraine  medications are not safe during pregnancy and the patient was made aware of this fact. Any pregnancy should be planned, and medications should be stopped PRIOR to pregnancy planning. Folic acid 1 mg daily was recommended. However, hormonal birth control complicates the management of migraine and can exacerbate migraine. If possible, mechanical contraception should be a better option.         PREGNANCY ISSUES         We had a lengthy discussion about managing migraine in patients who are trying to get pregnant or pregnant.    First, I recommend FA 1 mg QD     PRN medications are not safe. The safest option is Reglan PRN and not to be taken more than 2-3 times/week. Fioricet PRN is an option.     Traditional preventative options are not safe including Botox. Cefaly and Relivion are considered safe, but insurance does not cover it.        SCHOOL AND WORK ACCOMMODATIONS        Allow the patient to wear sunglasses or a cap and switch out fluorescent bulbs.    Allow the patient to arrive 5 minutes later and leave 5 minutes earlier to avoid noisy traffic.    Allow the patient to carry a water bottle and refill as needed.    Allow the patient to snack whenever is needed.    Allow the patient to decrease the computer brightness.    Allow the patient to take breaks as needed and extra time for assignments and deadlines.    Allow the patient to avoid strenuous activity as needed.         CONCUSSION EDUCATION:       Concussion occurs at roughly 90 to 100 g-force, which equates to smashing your skull against a wall at 20 mph.    Cognitive and Physical Rest.    Minimize Stimulation.        If symptoms worsen, new symptoms show up or symptoms persist beyond 2 weeks will get Brain MRI (ASL)    If headache continues beyond 2 weeks start: Amitriptyline/Elavil and monitor for sedation.     If cognitive symptoms continue beyond 2 weeks start: Amantadine 100 mg BID.         RETURN TO SCHOOL/SPORT/EXCERCISE AS LONG AS SYMPTOM FREE      MOST CRITICAL IS THE FIRST 3 MONTHS AND ESPECIALLY FIRST 7-10 DAYS       SCHOOL:     Rest for 7 Days     Half Day School for 3 Days         SPORT/EXERCISE:       Rest for 10 Days     Minimal Aerobic Exercise for 3 Days    Moderate Aerobic Exercise for 3 Days    Non-contact Training for 3 Days     Intermittent Contact for 3 Days    Full Contact          MEDICAL/SURGICAL COMORBIDITIES     All relevant medical comorbidities noted and managed by primary care physician and medical care team.          HEALTHY LIFESTYLE AND PREVENTATIVE CARE    The patient to adhere to the age-appropriate health maintenance guidelines including screening tests and vaccinations. The patient to adhere to  healthy lifestyle, optimal weight, exercise, healthy diet, good sleep hygiene and avoiding drugs including smoking, alcohol and recreational drugs.    I spent a total of 39 minutes on the day of the visit.This includes face to face time and non-face to face time preparing to see the patient (eg, review of tests), obtaining and/or reviewing separately obtained history, documenting clinical information in the electronic or other health record, independently interpreting results and communicating results to the patient/family/caregiver, or care coordinator.     Please do not hesitate to contact me with any updates, questions or concerns.    No follow-ups on file.    Willie Berumen, MSN, FNP-C    General Neurology

## 2025-01-06 ENCOUNTER — TELEPHONE (OUTPATIENT)
Dept: PHYSICAL MEDICINE AND REHAB | Facility: CLINIC | Age: 40
End: 2025-01-06
Payer: COMMERCIAL

## 2025-01-06 ENCOUNTER — OFFICE VISIT (OUTPATIENT)
Dept: NEUROLOGY | Facility: CLINIC | Age: 40
End: 2025-01-06
Payer: COMMERCIAL

## 2025-01-06 ENCOUNTER — PATIENT MESSAGE (OUTPATIENT)
Dept: NEUROLOGY | Facility: CLINIC | Age: 40
End: 2025-01-06

## 2025-01-06 VITALS
HEIGHT: 63 IN | WEIGHT: 173.94 LBS | BODY MASS INDEX: 30.82 KG/M2 | DIASTOLIC BLOOD PRESSURE: 79 MMHG | SYSTOLIC BLOOD PRESSURE: 124 MMHG | HEART RATE: 71 BPM

## 2025-01-06 DIAGNOSIS — M54.12 CERVICAL RADICULOPATHY: ICD-10-CM

## 2025-01-06 DIAGNOSIS — R41.3 OTHER AMNESIA: ICD-10-CM

## 2025-01-06 DIAGNOSIS — A53.9 SERUM POSITIVE FOR TREPONEMA PALLIDUM BY PCR: ICD-10-CM

## 2025-01-06 DIAGNOSIS — F41.9 ANXIETY: ICD-10-CM

## 2025-01-06 DIAGNOSIS — M54.2 NECK PAIN, BILATERAL: ICD-10-CM

## 2025-01-06 DIAGNOSIS — G44.309 POST-CONCUSSION HEADACHE: ICD-10-CM

## 2025-01-06 DIAGNOSIS — M50.30 BULGING OF CERVICAL INTERVERTEBRAL DISC: ICD-10-CM

## 2025-01-06 DIAGNOSIS — H93.13 TINNITUS OF BOTH EARS: ICD-10-CM

## 2025-01-06 DIAGNOSIS — H53.2 DOUBLE VISION WITH BOTH EYES OPEN: ICD-10-CM

## 2025-01-06 DIAGNOSIS — E51.9 VITAMIN B1 DEFICIENCY: ICD-10-CM

## 2025-01-06 DIAGNOSIS — H53.8 BLURRY VISION, BILATERAL: ICD-10-CM

## 2025-01-06 DIAGNOSIS — R42 DIZZINESS AND GIDDINESS: ICD-10-CM

## 2025-01-06 DIAGNOSIS — R11.0 NAUSEA: ICD-10-CM

## 2025-01-06 DIAGNOSIS — S09.90XD INJURY OF HEAD, SUBSEQUENT ENCOUNTER: ICD-10-CM

## 2025-01-06 DIAGNOSIS — G43.719 INTRACTABLE CHRONIC MIGRAINE WITHOUT AURA AND WITHOUT STATUS MIGRAINOSUS: Primary | ICD-10-CM

## 2025-01-06 DIAGNOSIS — M48.02 NEURAL FORAMINAL STENOSIS OF CERVICAL SPINE: ICD-10-CM

## 2025-01-06 DIAGNOSIS — R55 SPELL OF LOSS OF CONSCIOUSNESS: ICD-10-CM

## 2025-01-06 PROCEDURE — 1159F MED LIST DOCD IN RCRD: CPT | Mod: CPTII,S$GLB,,

## 2025-01-06 PROCEDURE — 99999 PR PBB SHADOW E&M-EST. PATIENT-LVL V: CPT | Mod: PBBFAC,,,

## 2025-01-06 PROCEDURE — 99214 OFFICE O/P EST MOD 30 MIN: CPT | Mod: S$GLB,,,

## 2025-01-06 PROCEDURE — 3078F DIAST BP <80 MM HG: CPT | Mod: CPTII,S$GLB,,

## 2025-01-06 PROCEDURE — 3074F SYST BP LT 130 MM HG: CPT | Mod: CPTII,S$GLB,,

## 2025-01-06 PROCEDURE — 1160F RVW MEDS BY RX/DR IN RCRD: CPT | Mod: CPTII,S$GLB,,

## 2025-01-06 PROCEDURE — 3008F BODY MASS INDEX DOCD: CPT | Mod: CPTII,S$GLB,,

## 2025-01-06 RX ORDER — IBUPROFEN 800 MG/1
TABLET ORAL
COMMUNITY
Start: 2024-12-31

## 2025-01-06 RX ORDER — ONDANSETRON 4 MG/1
4 TABLET, ORALLY DISINTEGRATING ORAL EVERY 8 HOURS PRN
Qty: 30 TABLET | Refills: 0 | Status: SHIPPED | OUTPATIENT
Start: 2025-01-06 | End: 2025-02-05

## 2025-01-06 RX ORDER — RIZATRIPTAN BENZOATE 10 MG/1
10 TABLET ORAL DAILY PRN
Qty: 18 TABLET | Refills: 0 | Status: SHIPPED | OUTPATIENT
Start: 2025-01-06 | End: 2025-02-05

## 2025-01-06 RX ORDER — TOPIRAMATE 25 MG/1
25 TABLET ORAL 2 TIMES DAILY
Qty: 60 TABLET | Refills: 0 | Status: SHIPPED | OUTPATIENT
Start: 2025-01-06 | End: 2025-02-05

## 2025-01-14 ENCOUNTER — TELEPHONE (OUTPATIENT)
Dept: NEUROSURGERY | Facility: CLINIC | Age: 40
End: 2025-01-14
Payer: COMMERCIAL

## 2025-01-15 ENCOUNTER — TELEPHONE (OUTPATIENT)
Dept: NEUROSURGERY | Facility: CLINIC | Age: 40
End: 2025-01-15
Payer: COMMERCIAL

## 2025-01-15 NOTE — TELEPHONE ENCOUNTER
Attempted to reach patient again for scheduling of NRSX referral. No answer and VM did not . Also sent [employer]Mod[employer] message.  IVANA

## 2025-01-17 ENCOUNTER — OFFICE VISIT (OUTPATIENT)
Dept: PHYSICAL MEDICINE AND REHAB | Facility: CLINIC | Age: 40
End: 2025-01-17
Payer: COMMERCIAL

## 2025-01-17 VITALS — BODY MASS INDEX: 30.82 KG/M2 | WEIGHT: 173.94 LBS | RESPIRATION RATE: 13 BRPM | HEIGHT: 63 IN

## 2025-01-17 DIAGNOSIS — G56.01 CARPAL TUNNEL SYNDROME OF RIGHT WRIST: ICD-10-CM

## 2025-01-17 DIAGNOSIS — M79.18 CERVICAL MYOFASCIAL PAIN SYNDROME: Primary | ICD-10-CM

## 2025-01-17 PROCEDURE — 1159F MED LIST DOCD IN RCRD: CPT | Mod: CPTII,S$GLB,, | Performed by: PHYSICAL MEDICINE & REHABILITATION

## 2025-01-17 PROCEDURE — 99499 UNLISTED E&M SERVICE: CPT | Mod: 25,S$GLB,, | Performed by: PHYSICAL MEDICINE & REHABILITATION

## 2025-01-17 PROCEDURE — 3008F BODY MASS INDEX DOCD: CPT | Mod: CPTII,S$GLB,, | Performed by: PHYSICAL MEDICINE & REHABILITATION

## 2025-01-17 PROCEDURE — 1160F RVW MEDS BY RX/DR IN RCRD: CPT | Mod: CPTII,S$GLB,, | Performed by: PHYSICAL MEDICINE & REHABILITATION

## 2025-01-17 PROCEDURE — 95885 MUSC TST DONE W/NERV TST LIM: CPT | Mod: S$GLB,,, | Performed by: PHYSICAL MEDICINE & REHABILITATION

## 2025-01-17 PROCEDURE — 95911 NRV CNDJ TEST 9-10 STUDIES: CPT | Mod: S$GLB,,, | Performed by: PHYSICAL MEDICINE & REHABILITATION

## 2025-01-17 PROCEDURE — 99999 PR PBB SHADOW E&M-EST. PATIENT-LVL III: CPT | Mod: PBBFAC,,, | Performed by: PHYSICAL MEDICINE & REHABILITATION
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
## 2025-01-24 ENCOUNTER — CLINICAL SUPPORT (OUTPATIENT)
Dept: AUDIOLOGY | Facility: CLINIC | Age: 40
End: 2025-01-24
Payer: COMMERCIAL

## 2025-01-24 ENCOUNTER — OFFICE VISIT (OUTPATIENT)
Dept: OTOLARYNGOLOGY | Facility: CLINIC | Age: 40
End: 2025-01-24
Payer: COMMERCIAL

## 2025-01-24 VITALS — BODY MASS INDEX: 30.82 KG/M2 | HEIGHT: 63 IN | WEIGHT: 173.94 LBS

## 2025-01-24 DIAGNOSIS — S06.0X1A: Primary | ICD-10-CM

## 2025-01-24 DIAGNOSIS — H93.13 TINNITUS OF BOTH EARS: ICD-10-CM

## 2025-01-24 DIAGNOSIS — H93.13 SUBJECTIVE TINNITUS OF BOTH EARS: Primary | ICD-10-CM

## 2025-01-24 PROCEDURE — 99999 PR PBB SHADOW E&M-EST. PATIENT-LVL III: CPT | Mod: PBBFAC,,, | Performed by: STUDENT IN AN ORGANIZED HEALTH CARE EDUCATION/TRAINING PROGRAM

## 2025-01-24 PROCEDURE — 3008F BODY MASS INDEX DOCD: CPT | Mod: CPTII,S$GLB,, | Performed by: STUDENT IN AN ORGANIZED HEALTH CARE EDUCATION/TRAINING PROGRAM

## 2025-01-24 PROCEDURE — 92557 COMPREHENSIVE HEARING TEST: CPT | Mod: S$GLB,,, | Performed by: AUDIOLOGIST

## 2025-01-24 PROCEDURE — 1159F MED LIST DOCD IN RCRD: CPT | Mod: CPTII,S$GLB,, | Performed by: STUDENT IN AN ORGANIZED HEALTH CARE EDUCATION/TRAINING PROGRAM

## 2025-01-24 PROCEDURE — 99204 OFFICE O/P NEW MOD 45 MIN: CPT | Mod: S$GLB,,, | Performed by: STUDENT IN AN ORGANIZED HEALTH CARE EDUCATION/TRAINING PROGRAM

## 2025-01-24 PROCEDURE — 92567 TYMPANOMETRY: CPT | Mod: S$GLB,,, | Performed by: AUDIOLOGIST
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
## 2025-01-28 ENCOUNTER — CLINICAL SUPPORT (OUTPATIENT)
Dept: REHABILITATION | Facility: HOSPITAL | Age: 40
End: 2025-01-28
Payer: COMMERCIAL

## 2025-01-28 DIAGNOSIS — R42 DIZZINESS AND GIDDINESS: ICD-10-CM

## 2025-01-28 DIAGNOSIS — R29.898 DECREASED RANGE OF MOTION OF NECK: ICD-10-CM

## 2025-01-28 DIAGNOSIS — M62.81 PROXIMAL MUSCLE WEAKNESS: ICD-10-CM

## 2025-01-28 DIAGNOSIS — M25.612 DECREASED RANGE OF MOTION OF LEFT SHOULDER: ICD-10-CM

## 2025-01-28 DIAGNOSIS — Z74.09 DECREASED FUNCTIONAL MOBILITY AND ENDURANCE: ICD-10-CM

## 2025-01-28 DIAGNOSIS — M25.611 DECREASED RANGE OF MOTION OF RIGHT SHOULDER: Primary | ICD-10-CM

## 2025-01-28 DIAGNOSIS — G43.719 INTRACTABLE CHRONIC MIGRAINE WITHOUT AURA AND WITHOUT STATUS MIGRAINOSUS: ICD-10-CM

## 2025-01-28 DIAGNOSIS — R29.3 POOR POSTURE: ICD-10-CM

## 2025-01-28 PROCEDURE — 97140 MANUAL THERAPY 1/> REGIONS: CPT

## 2025-01-28 PROCEDURE — 97162 PT EVAL MOD COMPLEX 30 MIN: CPT

## 2025-01-28 PROCEDURE — 97112 NEUROMUSCULAR REEDUCATION: CPT

## 2025-01-28 NOTE — PROGRESS NOTES
Outpatient Rehab    Physical Therapy Evaluation    Patient Name: Izzy Horner  MRN: [id number]  YOB: 1985  Today's Date: 1/28/2025    Therapy Diagnosis:   Encounter Diagnoses   Name Primary?    Intractable chronic migraine without aura and without status migrainosus     Dizziness and giddiness     Decreased range of motion of right shoulder Yes    Decreased range of motion of left shoulder     Decreased functional mobility and endurance     Decreased range of motion of neck     Poor posture     Proximal muscle weakness      Physician: Willie Berumen, NP    Physician Orders: Eval and Treat  Medical Diagnosis: Intractable chronic migraine without aura and without status migrainosus [G43.719], Dizziness and giddiness [R42]     Visit # / Visits Authorized:  1 / 1   Date of Evaluation:  1/28/2025   Insurance Authorization Period: 1/6/2025 - 12/31/2025   Plan of Care Certification:  1/28/2025 to 4/22/2024      Time In: 1105   Time Out: 1202  Total Time: 57   Total Billable Time: 51         Subjective   History of Present Illness  Izzy is a 39 y.o. female who reports to physical therapy with a chief concern of Headaches. According to the patient's chart, Izzy has a past medical history of Anemia and Encounter for blood transfusion. Izzy has no past surgical history on file.                History of Present Condition/Illness: Chronic headaches after an accident at work several months ago. Had been feeling good following her last round of PT with decreased frequency to 0-2 times per week. However, notes that when she stopped coming to therapy her headaches started returning again. States her MD thinks she just needs a little more PT. She is currently on a 21 day fast and is only eating fruits and vegetables. She feels like this may be helping some.     Pain     Patient reports a current pain level of 3/10. Pain at best is reported as 3/10. Pain at worst is reported as 10/10.   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       Past Medical History/Physical Systems Review:   Izzy Horner  has a past medical history of Anemia and Encounter for blood transfusion.    Izzy Horner  has no past surgical history on file.    Izzy has a current medication list which includes the following prescription(s): ferrous sulfate, ibuprofen, ondansetron, rizatriptan, and topiramate.    Review of patient's allergies indicates:  No Known Allergies     Objective   Posture  Patient presents with a Forward head position. Increased thoracic kyphosis is observed.   Shoulders are Rounded.             Subcranial Range of Motion   Active Restricted? Passive Restricted? Pain   Flexion         Protraction         Retraction           Cervical Range of Motion   Active (deg) Passive (deg) Pain   Flexion 40   Yes   Extension 45   Yes   Right Lateral Flexion 43       Right Rotation 65       Left Lateral Flexion 41       Left Rotation 60              Shoulder Range of Motion  Right Shoulder   Active (deg) Passive (deg) Pain   Flexion 170   Yes   Extension         Scaption         ABduction 120   Yes   ADduction         Horizontal ABduction         Horizontal ADduction         External Rotation (Shoulder ABducted 0 degrees)         External Rotation (Shoulder ABducted 45 degrees)         External Rotation (Shoulder ABducted 90 degrees)         Internal Rotation (Shoulder ABducted 0 degrees) 70       Internal Rotation (Shoulder ABducted 45 degrees)         Internal Rotation (Shoulder ABducted 90 degrees)           Left Shoulder   Active (deg) Passive (deg) Pain   Flexion 170       Extension         Scaption         ABduction 120   Yes    ADduction         Horizontal ABduction         Horizontal ADduction         External Rotation (Shoulder ABducted 0 degrees)         External Rotation (Shoulder ABducted 45 degrees)         External Rotation (Shoulder ABducted 90 degrees)         Internal Rotation (Shoulder ABducted 0 degrees) 70       Internal Rotation (Shoulder ABducted 45 degrees)         Internal Rotation (Shoulder ABducted 90 degrees)                       Shoulder Strength - Planes of Motion   Right Strength Right Pain Left Strength Left  Pain   Flexion 4-   4-     Extension 4   4     ABduction 3+   3+     ADduction           Horizontal ABduction           Horizontal ADduction           Internal Rotation 0° 4+   4+     Internal Rotation 90°           External Rotation 0° 4   4     External Rotation 90°                            Intake Outcome Measure for FOTO Survey    Therapist reviewed FOTO scores for Izzy Horner on 1/28/2025.   FOTO report - see Media section or FOTO account episode details.     Intake Score: 50%    Treatment:  CPT Intervention Performed  Today Duration / Intensity       MT Soft tissue mobilization  x B suboccipitals, paraspinals, upper trapezius and SCM   TE UBE   3 minutes forward, 3 minutes backwards     Upper Trapezius Stretch  x 3 x 30 seconds bilateral    NMR Supine Chin Tucks   2 minutes 3 second holds     B shoulder ER x  3 x 10 green band     supine scapular retractions   3 minutes 5 second holds, one second off     series 6- half foam  x 1 minutes each     Open books x 2 minutes each bilateral              TA Scapular Retractions    3 x 10 blue band     Shoulder Extensions    3 x 10 green band   PLAN  UBE   Upper trapezius stretch   Cervical isometrics   Chin tucks   Supine cervical rotations   Cervical retractions   B shoulder ER   Rows   Shoulder extensions              CPT Codes available for Billing:   (12) minutes of Manual therapy (MT) to improve pain and ROM.  (04) minutes of Therapeutic  Exercise (TE) to develop strength, endurance, range of motion, and flexibility.  (15) minutes of Neuromuscular Re-Education (NMR)  to improve: Balance, Coordination, Kinesthetic, Sense, Proprioception, and Posture.  (00) minutes of Therapeutic Activities (TA) to improve functional performance.  Unattended Electrical Stimulation (ES) for muscle performance or pain modulation.  BFR: Blood flow restriction applied during exercise  NP or (-): Not Performed            Patient's spiritual, cultural, and educational needs considered and patient agreeable to plan of care and goals.     Assessment & Plan   Assessment  Izzy presents with a condition of Moderate complexity.   Presentation of Symptoms: Evolving  Will Comorbidities Impact Care: No            Patient Goal for Therapy (PT): Decrease headache frequency and intensity in order to return to prior level of function  Prognosis: Excellent  Assessment Details: Pt presents with impairments in the following categories: IMPAIRMENTS: ROM, strength, posture, and core strength and stability.  Pt will benefit from skilled outpatient Physical Therapy to address the deficits stated above, provide pt/family education, and to maximize pt's level of independence.      Plan  From a physical therapy perspective, the patient would benefit from: Skilled Rehab Services                           This plan was discussed with Patient.   Plan details: .pocOutpatient Physical Therapy to include any combination of the following interventions: virtual visits, dry needling, modalities, electrical stimulation (IFC, Pre-Mod, Attended with Functional Dry Needling), Cervical/Lumbar Traction, Gait Training, Manual Therapy, Neuromuscular Re-ed, Patient Education, Self Care, Therapeutic Exercise, and Therapeutic Activites          Goals:   Active       Long Term Goal        Pt will report worst pain of 4/10 in order to progress toward max functional ability and improve quality of life                 Start:  01/28/25    Expected End:  04/22/25            Patient will improve shoulder abduction AROM to 180 in order to progress towards independence with functional activities.         Start:  01/28/25    Expected End:  04/22/25            Patient will improve strength to at least 4+/5 grossly,  in order to improve functional independence and quality of life.         Start:  01/28/25    Expected End:  04/22/25               Short Term Goal       Pt will report worst pain of 7/10 in order to progress toward max functional ability and improve quality of life                Start:  01/28/25    Expected End:  03/11/25            Patient will improve cervical flexion AROM to 60 in order to progress towards independence with functional activities.         Start:  01/28/25    Expected End:  03/11/25            Patient will improve strength to at least 4+/5 grossly,  in order to improve functional independence and quality of life.         Start:  01/28/25    Expected End:  03/11/25

## 2025-01-30 ENCOUNTER — CLINICAL SUPPORT (OUTPATIENT)
Dept: REHABILITATION | Facility: HOSPITAL | Age: 40
End: 2025-01-30
Payer: COMMERCIAL

## 2025-01-30 DIAGNOSIS — M25.611 DECREASED RANGE OF MOTION OF RIGHT SHOULDER: ICD-10-CM

## 2025-01-30 DIAGNOSIS — M25.612 DECREASED RANGE OF MOTION OF LEFT SHOULDER: ICD-10-CM

## 2025-01-30 DIAGNOSIS — M62.81 PROXIMAL MUSCLE WEAKNESS: ICD-10-CM

## 2025-01-30 DIAGNOSIS — R29.3 POOR POSTURE: ICD-10-CM

## 2025-01-30 DIAGNOSIS — R29.898 DECREASED RANGE OF MOTION OF NECK: ICD-10-CM

## 2025-01-30 DIAGNOSIS — Z74.09 DECREASED FUNCTIONAL MOBILITY AND ENDURANCE: Primary | ICD-10-CM

## 2025-01-30 PROCEDURE — 97112 NEUROMUSCULAR REEDUCATION: CPT

## 2025-01-30 PROCEDURE — 97530 THERAPEUTIC ACTIVITIES: CPT

## 2025-01-30 PROCEDURE — 97140 MANUAL THERAPY 1/> REGIONS: CPT

## 2025-01-30 PROCEDURE — 97110 THERAPEUTIC EXERCISES: CPT

## 2025-01-30 NOTE — PROGRESS NOTES
Outpatient Rehab    Physical Therapy Visit    Patient Name: Izzy Horner  MRN: [id number]  YOB: 1985  Today's Date: 1/30/2025    Therapy Diagnosis:   Encounter Diagnoses   Name Primary?    Decreased functional mobility and endurance Yes    Decreased range of motion of neck     Poor posture     Proximal muscle weakness     Decreased range of motion of right shoulder     Decreased range of motion of left shoulder      Physician: Willie Berumen NP    Medical Diagnosis: Intractable chronic migraine without aura and without status migrainosus [G43.719], Dizziness and giddiness [R42]      Visit # / Visits Authorized:  1 / 25 (+1 from evaluation)  Date of Evaluation:  1/28/2025   Insurance Authorization Period: 1/6/2025 - 12/31/2025   Plan of Care Certification:  1/28/2025 to 4/22/2024      Time In: 0900   Time Out: 1001  Total Time: 61   Total Billable Time: 55         Subjective   she felt great following previous session but notes that she must have slept funny last night and woke up with increased pain in her neck.         Objective            Treatment:  CPT Intervention Performed  Today Duration / Intensity       MT Soft tissue mobilization  x B suboccipitals, paraspinals, upper trapezius and SCM   TE UBE x 3 minutes forward, 3 minutes backwards     Upper Trapezius Stretch  x 3 x 30 seconds bilateral      Pec corner stretch  X 10x10s holds    NMR Supine Chin Tucks  x 2 minutes 3 second holds     Chin tuck and lift  X 2x10      B shoulder ER    3 x 10 green band     supine scapular retractions   3 minutes 5 second holds, one second off     series 6- half foam  X  X 1 minutes each  Red band x 30 reps each   B shoulder ER  Pull aparts  Diagonal pull aparts  Flexion     Open books x 2 minutes each bilateral              TA Scapular Retractions  x 3 x 10 5#     Shoulder Extensions x  3 x 10 5#   PLAN  UBE   Upper trapezius stretch   Cervical isometrics   Chin tucks   Supine cervical rotations    Cervical retractions   B shoulder ER   Rows   Shoulder extensions              CPT Codes available for Billing:   (15) minutes of Manual therapy (MT) to improve pain and ROM.  (12) minutes of Therapeutic Exercise (TE) to develop strength, endurance, range of motion, and flexibility.  (20) minutes of Neuromuscular Re-Education (NMR)  to improve: Balance, Coordination, Kinesthetic, Sense, Proprioception, and Posture.  (08) minutes of Therapeutic Activities (TA) to improve functional performance.  Unattended Electrical Stimulation (ES) for muscle performance or pain modulation.  BFR: Blood flow restriction applied during exercise  NP or (-): Not Performed            Patient's spiritual, cultural, and educational needs considered and patient agreeable to plan of care and goals.     Assessment & Plan   Assessment: Pt tolerated session well today. incorporated chin tuck and lift to improve cervical stability and endurance.           Plan: Continue Plan of Care (POC) and progress per patient tolerance. See treatment section for details on planned progressions next session.    Goals:   Active       Long Term Goal        Pt will report worst pain of 4/10 in order to progress toward max functional ability and improve quality of life          (Progressing)       Start:  01/28/25    Expected End:  04/22/25            Patient will improve shoulder abduction AROM to 180 in order to progress towards independence with functional activities.   (Progressing)       Start:  01/28/25    Expected End:  04/22/25            Patient will improve strength to at least 4+/5 grossly,  in order to improve functional independence and quality of life.   (Progressing)       Start:  01/28/25    Expected End:  04/22/25               Short Term Goal       Pt will report worst pain of 7/10 in order to progress toward max functional ability and improve quality of life          (Progressing)       Start:  01/28/25    Expected End:  03/11/25             Patient will improve cervical flexion AROM to 60 in order to progress towards independence with functional activities.   (Progressing)       Start:  01/28/25    Expected End:  03/11/25            Patient will improve strength to at least 4+/5 grossly,  in order to improve functional independence and quality of life.   (Progressing)       Start:  01/28/25    Expected End:  03/11/25

## 2025-02-04 ENCOUNTER — CLINICAL SUPPORT (OUTPATIENT)
Dept: REHABILITATION | Facility: HOSPITAL | Age: 40
End: 2025-02-04
Payer: COMMERCIAL

## 2025-02-04 DIAGNOSIS — M25.611 DECREASED RANGE OF MOTION OF RIGHT SHOULDER: ICD-10-CM

## 2025-02-04 DIAGNOSIS — Z74.09 DECREASED FUNCTIONAL MOBILITY AND ENDURANCE: Primary | ICD-10-CM

## 2025-02-04 DIAGNOSIS — R29.898 DECREASED RANGE OF MOTION OF NECK: ICD-10-CM

## 2025-02-04 DIAGNOSIS — M25.612 DECREASED RANGE OF MOTION OF LEFT SHOULDER: ICD-10-CM

## 2025-02-04 DIAGNOSIS — M62.81 PROXIMAL MUSCLE WEAKNESS: ICD-10-CM

## 2025-02-04 DIAGNOSIS — R29.3 POOR POSTURE: ICD-10-CM

## 2025-02-04 PROCEDURE — 97530 THERAPEUTIC ACTIVITIES: CPT

## 2025-02-04 PROCEDURE — 97140 MANUAL THERAPY 1/> REGIONS: CPT

## 2025-02-04 PROCEDURE — 97110 THERAPEUTIC EXERCISES: CPT

## 2025-02-04 PROCEDURE — 97112 NEUROMUSCULAR REEDUCATION: CPT

## 2025-02-04 NOTE — PROGRESS NOTES
Outpatient Rehab    Physical Therapy Visit    Patient Name: Izzy Horner  MRN: [id number]  YOB: 1985  Today's Date: 2/4/2025    Therapy Diagnosis:   Encounter Diagnoses   Name Primary?    Decreased functional mobility and endurance Yes    Decreased range of motion of neck     Poor posture     Proximal muscle weakness     Decreased range of motion of right shoulder     Decreased range of motion of left shoulder      Physician: Willie Berumen NP    Medical Diagnosis: Intractable chronic migraine without aura and without status migrainosus [G43.719], Dizziness and giddiness [R42]      Visit # / Visits Authorized:  2 / 25 (+1 from evaluation)  Date of Evaluation:  1/28/2025   Insurance Authorization Period: 1/6/2025 - 12/31/2025   Plan of Care Certification:  1/28/2025 to 4/22/2024      Time In: 1000   Time Out: 1103  Total Time: 63   Total Billable Time: 52         Subjective   She woke up with a pretty bad headache today but states she is ready for her session. Notes that she felt really great following her previous session.         Objective            Treatment:  CPT Intervention Performed  Today Duration / Intensity       MT Soft tissue mobilization  x B suboccipitals, paraspinals, upper trapezius and SCM   TE UBE x 3 minutes forward, 3 minutes backwards     Pec corner stretch  X 10x10s holds      Upper Trapezius Stretch  x 3 x 30 seconds bilateral    NMR Supine Chin Tucks   2 minutes 3 second holds     Chin tuck and lift  X 2x10      Cervical retractions  X 3x10      B shoulder ER    3 x 10 green band     supine scapular retractions   3 minutes 5 second holds, one second off     series 6- half foam  X  X 1 minutes each  Green band x 30 reps each   B shoulder ER  Pull aparts  Diagonal pull aparts  Flexion     Open books x 2 minutes each bilateral              TA Scapular Retractions  x 3 x 10 5#     Shoulder Extensions x 3 x 10 5#   PLAN  UBE   Upper trapezius stretch   Cervical  isometrics   Chin tucks   Supine cervical rotations   Cervical retractions   B shoulder ER   Rows   Shoulder extensions              CPT Codes available for Billing:   (12) minutes of Manual therapy (MT) to improve pain and ROM.  (12) minutes of Therapeutic Exercise (TE) to develop strength, endurance, range of motion, and flexibility.  (20) minutes of Neuromuscular Re-Education (NMR)  to improve: Balance, Coordination, Kinesthetic, Sense, Proprioception, and Posture.  (08) minutes of Therapeutic Activities (TA) to improve functional performance.  Unattended Electrical Stimulation (ES) for muscle performance or pain modulation.  BFR: Blood flow restriction applied during exercise  NP or (-): Not Performed            Patient's spiritual, cultural, and educational needs considered and patient agreeable to plan of care and goals.     Assessment & Plan   Assessment: Patient tolerated session well today.  Added cervical retractions to improve cervical stability.           Plan: Continue Plan of Care (POC) and progress per patient tolerance. See treatment section for details on planned progressions next session.    Goals:   Active       Long Term Goal        Pt will report worst pain of 4/10 in order to progress toward max functional ability and improve quality of life          (Progressing)       Start:  01/28/25    Expected End:  04/22/25            Patient will improve shoulder abduction AROM to 180 in order to progress towards independence with functional activities.   (Progressing)       Start:  01/28/25    Expected End:  04/22/25            Patient will improve strength to at least 4+/5 grossly,  in order to improve functional independence and quality of life.   (Progressing)       Start:  01/28/25    Expected End:  04/22/25               Short Term Goal       Pt will report worst pain of 7/10 in order to progress toward max functional ability and improve quality of life          (Progressing)       Start:  01/28/25     Expected End:  03/11/25            Patient will improve cervical flexion AROM to 60 in order to progress towards independence with functional activities.   (Progressing)       Start:  01/28/25    Expected End:  03/11/25            Patient will improve strength to at least 4+/5 grossly,  in order to improve functional independence and quality of life.   (Progressing)       Start:  01/28/25    Expected End:  03/11/25                Randa Brady, PT

## 2025-02-06 NOTE — PROGRESS NOTES
"Subjective:       Patient ID: Izzy Horner is a 39 y.o. female.      Chief Complaint: "Headaches".        HPI    HPI 39 Years old Female with PMHx of anemia  and other medical conditions came for the follow-up evaluation and recommendation of "Headaches".      Interval History: (11/04/2024) - Ordered ENT referral / ophthalmology referral / routine EEG / brain MRI without contrast / MRI cervical spine without contrast / PT therapy / MAGGY / ESR / CRP / HCG / FA / Homocysteine / RPR / B1 / B12. Started Topamax 25 mg PO BID. Continued rizatriptan 10 mg daily p.r.n and ibuprofen 800 mg p.o. every 8 hours p.r.n.       Headache History:    Onset: The patient began experiencing "Headaches" in June 2024 following a head injury sustained at work, where a knob from a paper roll fell off the glider and struck her left supraorbital region per patient. She reports that after the impact, she stumbled to a chair, experiencing dizziness and briefly losing consciousness for a few seconds; however, she managed to sit in the chair. No seizures at the time of head injury. No open wounds or skull fracture at the time of the head injury. No loss of vision or hearing at the time of head injury. No paralysis or weakness at the time of head injury. No vomiting at the time of head injury. The patient was evaluated at [facility], where X-rays and a head CT scan were performed, both of which returned normal results according to her report. Following the incident, she noted residual dizziness and headache pain. One week later, she developed bilateral floaters, bilateral tinnitus, and brain fog per patient.     Description: Headaches are pressure, aching, stabbing, and throbbing-like, building up slowly towards the night and early morning. Headaches do wake them up from sleep. They are progressively worsening and interfering with daily activities.    Timing:  Duration of 3 hours.    Frequency: Intermittent, headaches reported 3 " "times per week, and 5 per month are migraine-like.    Pain Severity: Increasing in severity, rated 8-10/10, causing significant morbidity.    Location: Bilateral frontal and temporal areas / Usually starts behind left supraorbital region and radiates to left generalized lobes ending to occipital area.    Family History: No family history of early dementia or Migraines.     Medications: Advil 800 mg Q8H PRN - not helping     Worsening Factors: None / Denies physical and emotional stressors. No specific food triggers identified.    Alleviating Factors: Dark and quiet room     Associated Symptoms: Bilateral eye pressure, light and sound sensitivity, dizziness she means "light-headed", nausea, does report scalp sensitivity to left occipital area, Neck Pain with radiation to the left upper extremity, bilateral hand numbness / tingling, bilateral blurry vision, double vision, and floaters, bilateral ear ringing, spells of loss of awareness where she stares into space since head injury, brain fog.     Pertinent Negative Symptoms: No other associated neurological deficits    Positional/Behavioral Factors: Headaches are positional and postural, worsened by movement and bending the head downward. Denies worsening with Valsalva maneuver.    Triggers: Bending head in a downward position.     Prodromal Symptoms: No visual aura, irritability, or urinary frequency.    Exclusions: No TMJ issues, seizures, smoking history, caffeine overuse, vertigo, blackouts, fever, chills, or significant memory loss. No history of strokes or falls. No prior history of prior TBI. No premorbid history of anxiety-depression. No history off premorbid alcoholism. No premorbid history of migraine headaches. No premorbid history of neck or back pains.  No symptoms of sleep apnea (e.g., snoring, gasping, frequent nighttime awakening, daytime fatigue).         Interval History:  (12/03/2024) - Continued Topamax 25 mg PO BID, rizatriptan 10 mg daily p.r.n., " "ibuprofen 800 mg p.o. every 8 hours p.r.n., routine EEG, EMG-NCS, Ophthalmology, ENT,  infectious disease, neurosurgery, Psychiatry, and PT Referrals.     Headaches have slightly improved per patient since last visit. She reports a reduced frequency due to taking Ibuprofen PRN.   Location is Bilateral frontal and temporal areas / Usually starts behind left supraorbital region and radiates to left generalized lobes ending to occipital area.  No Headache auras   Frequency of tension type is 3 times per week and migraine type is 4-5 per month.   Duration of tension type is 3 hours and migraine type is 12 hours.   Still "pressure, aching, stabbing, and throbbing-like" in nature  Pain intensity is still 8-10/10  Worsened by loud noises  Triggered by Bending head in a downward position / loud noises  Alleviated with Dark and quiet room   Associated symptoms include Bilateral eye pressure, light and sound sensitivity, dizziness she means "light-headed", nausea, does report scalp sensitivity to left occipital area, Neck Pain with radiation to the left upper extremity, bilateral hand numbness / tingling, bilateral blurry vision, double vision, and floaters, bilateral ear ringing, spells of loss of awareness where she stares into space since head injury, brain fog, Anxiety.       Interval History: (01/06/2025) - Continued Topamax 25 mg PO BID, rizatriptan 10 mg daily p.r.n., ibuprofen 800 mg p.o. every 8 hours p.r.n., PT, ophthalmology, Neurosurgery, ID, Psychiatry, & ENT referrals, Routine EEG, and EMG-NCS. Started Zofran-ODT 4 mg PO Q8H PRN.           Headaches have slightly improved per patient since last visit.   Location is Bilateral frontal and temporal areas / Usually starts behind left supraorbital region and radiates to left generalized lobes ending to occipital area.  No Headache auras   Frequency of tension type is 3 times per week and migraine type is 4-5 per month.   Duration of tension type is 3 hours and " "migraine type is 12 hours.   Still "pressure, aching, stabbing, and throbbing-like" in nature  Pain intensity is still 8-10/10  Worsened by loud noises  Triggered by Bending head in a downward position / loud noises  Alleviated with Dark and quiet room     Associated symptoms include Bilateral eye pressure, light and sound sensitivity, dizziness she means "light-headed", nausea, does report scalp sensitivity to left occipital area, Neck Pain with radiation to the left upper extremity, bilateral hand numbness / tingling, bilateral blurry vision, double vision, and floaters, bilateral ear ringing, spells of loss of awareness where she stares into space since head injury, brain fog, Anxiety.     Pertinent Negative Symptoms: No other associated neurological deficits          New Issues: (02/07/2025) -     No new issues per patient.     Medications:  Topamax 25 mg PO BID:   Rizatriptan 10 mg daily p.r.n.  Ibuprofen 800 mg PO every 8 hours p.r.n.:   Zofran-ODT 4 mg PO Q8H PRN  Patient is tolerating all medications well without side effects. She reports taking it as prescribed.    Headaches have significantly improved per patient since last visit. She reports a reduced frequency due to taking Ibuprofen PRN, PT, and dietary changes (Vegan).   Location is Bilateral frontal and temporal areas / Usually starts behind left supraorbital region and radiates to left generalized lobes ending to occipital area.  No Headache auras   Frequency -decreased -  tension type is 1 per month and migraine type is 2 per month.   Duration  -decreased tension type is 2-3 hours and migraine type is 12 hours.   Still "pressure, aching, stabbing, and throbbing-like" in nature  Pain intensity is decreased 7/10  Worsened by loud noises  Triggered by Bending head in a downward position / loud noises  Alleviated with Dark and quiet room     Associated symptoms include Bilateral eye pressure, light and sound sensitivity, dizziness she means " ""light-headed", nausea, does report scalp sensitivity to left occipital area, Neck Pain with radiation to the left upper extremity, bilateral hand numbness / tingling, bilateral blurry vision, double vision, and floaters, bilateral ear ringing, spells of loss of awareness where she stares into space since head injury, brain fog, Anxiety.     Pertinent Negative Symptoms: No other associated neurological deficits, vomiting, balance issues, acute thunderclap onset, focal or bilateral limb weakness      Referrals:  ENT Referral: no acute abnormalities per patient.   Ophthalmology Referral: Patient was seen in 12/2024, and no acute abnormalities were reported. Examination revealed normal intraocular pressures, no papilledema, and no other abnormalities per patient.  Physical Therapy (PT): Patient reports significant improvement in neck tightness and headaches with physical therapy.  Neurosurgery: Pending evaluation for cervical radiculopathy and abnormal C-spine MRI  Infectious Disease: Patient was seen in 12/2024 and received a dose of NH Penicillin G Benzathine injection, 100,000 units.  Psychiatry: Pending evaluation, with an upcoming appointment in 02/26/2025.  EMG-NCS for Cervical Radiculopathy - complete    Abortive therapies (tried and failed): Ibuprofen 800 mg PO every 8 hours p.r.n. / Rizatriptan - Current      Preventative therapies (tried and failed): Topamax - Current     Pregnancy and birth control: None       Review of Systems   Constitutional:  Negative for activity change, appetite change, chills, diaphoresis, fatigue, fever and unexpected weight change.   HENT:  Positive for tinnitus. Negative for congestion, dental problem, drooling, ear discharge, ear pain, facial swelling, hearing loss, mouth sores, nosebleeds, postnasal drip, rhinorrhea, sinus pressure, sinus pain, sneezing, sore throat, trouble swallowing and voice change.    Eyes:  Positive for photophobia and visual disturbance. Negative for pain, " discharge, redness and itching.        Bilateral eye pressure   Respiratory:  Negative for cough, chest tightness, shortness of breath and wheezing.    Cardiovascular:  Negative for chest pain, palpitations and leg swelling.   Gastrointestinal:  Positive for nausea. Negative for abdominal distention, abdominal pain, blood in stool, constipation, diarrhea and vomiting.   Endocrine: Negative for cold intolerance, heat intolerance, polydipsia, polyphagia and polyuria.   Genitourinary:  Negative for decreased urine volume, difficulty urinating, dysuria, flank pain, frequency, hematuria, pelvic pain, urgency and vaginal discharge.   Musculoskeletal:  Positive for neck pain. Negative for arthralgias, back pain, gait problem, joint swelling, myalgias and neck stiffness.        Neck Pain with radiation to the left upper extremity   Skin:  Negative for color change and rash.   Allergic/Immunologic: Negative for immunocompromised state.   Neurological:  Positive for dizziness, light-headedness, numbness and headaches. Negative for tremors, seizures, syncope, facial asymmetry, speech difficulty and weakness.        Patient does report scalp sensitivity to left occipital area    Patient spells of loss of awareness where she stares into space since head injury   Hematological:  Negative for adenopathy. Does not bruise/bleed easily.   Psychiatric/Behavioral:  Negative for agitation, behavioral problems, confusion, decreased concentration, dysphoric mood, hallucinations, self-injury, sleep disturbance and suicidal ideas. The patient is nervous/anxious. The patient is not hyperactive.    All other systems reviewed and are negative.                Current Outpatient Medications:     ferrous sulfate 325 (65 FE) MG EC tablet, Take 1 tablet (325 mg total) by mouth once daily., Disp: 30 tablet, Rfl: 5    ibuprofen (ADVIL,MOTRIN) 800 MG tablet, Take by mouth., Disp: , Rfl:     rizatriptan (MAXALT) 10 MG tablet, Take 1 tablet (10 mg  total) by mouth daily as needed for Migraine. If symptoms persist or return, may repeat dose after 2 hours. Do not exceed more than 2 tablets in 24hr period, Disp: 18 tablet, Rfl: 0    topiramate (TOPAMAX) 25 MG tablet, Take 1 tablet (25 mg total) by mouth 2 (two) times daily. Week 1: Take 1 tablet every evening then increase twice daily thereafter, Disp: 60 tablet, Rfl: 0    Past Medical History:   Diagnosis Date    Anemia     Encounter for blood transfusion        No past surgical history on file.    Social History     Socioeconomic History    Marital status: Single   Tobacco Use    Smoking status: Never    Smokeless tobacco: Never   Substance and Sexual Activity    Alcohol use: No     Comment: OCC    Drug use: No    Sexual activity: Yes     Partners: Male     Birth control/protection: None         Past/Current Medical/Surgical History, Past/Current Social History, Past/Current Family History and Past/Current Medications were reviewed in detail.    Objective:           VITAL SIGNS WERE REVIEWED      GENERAL APPEARANCE:     The patient looks comfortable.    BMI    No signs of respiratory distress.    Normal breathing pattern.    No dysmorphic features    Normal eye contact.       GENERAL MEDICAL EXAM:    HEENT:  Head is atraumatic normocephalic.     FUNDUSCOPIC (OPHTHALMOSCOPIC) EXAMINATION showed no disc edema (papilledema).      NECK: No JVD. No visible lesions or goiters.     CHEST-CARDIOPULMONARY: No cyanosis. No tachypnea. Normal respiratory effort.    [id number]: No jaundice. No stomas or lesions. No visible hernias. No catheters.     SKIN, HAIR, NAILS: No pathognomonic skin rash.No neurofibromatosis. No visible lesions.No stigmata of autoimmune disease. No clubbing.    LIMBS: No varicose veins. No visible swelling.    MUSCULOSKELETAL: No visible deformities.No visible lesions.             Neurological Exam  Mental Status  Awake, alert and oriented to person, place and time. Oriented  to person, place, time and situation. Recent and remote memory are intact. At 5 minutes recalls 3 of 3 objects. Speech is normal. Language is fluent with no aphasia. Attention and concentration are normal. Fund of knowledge is appropriate for level of education. Apraxia absent.    Cranial Nerves  CN I: Sense of smell is normal.  CN II: Visual acuity is normal. Visual fields full to confrontation. Right funduscopic exam: disc intact. Left funduscopic exam: disc intact.  CN III, IV, VI: Extraocular movements intact bilaterally. Normal lids and orbits bilaterally. Pupils equal round and reactive to light bilaterally.  CN V: Facial sensation is normal.  CN VII: Full and symmetric facial movement.  CN VIII: Hearing is normal.  CN IX, X: Palate elevates symmetrically. Normal gag reflex.  CN XI: Shoulder shrug strength is normal.  CN XII: Tongue midline without atrophy or fasciculations.    Motor  Normal muscle bulk throughout. No fasciculations present. Normal muscle tone. No abnormal involuntary movements. Strength is 5/5 throughout all four extremities.    Sensory  Sensation is intact to light touch, pinprick, vibration and proprioception in all four extremities.    Reflexes                                            Right                      Left  Brachioradialis                    2+                         2+  Biceps                                 2+                         2+  Triceps                                2+                         2+  Finger flex                           2+                         2+  Hamstring                            2+                         2+  Patellar                                2+                         2+  Achilles                                2+                         2+    Coordination  Right: Finger-to-nose normal. Rapid alternating movement normal. Heel-to-shin normal.Left: Finger-to-nose normal. Rapid alternating movement normal. Heel-to-shin  normal.    Gait  Casual gait is normal including stance, stride, and arm swing.Normal toe walking. Normal heel walking. Normal tandem gait. Romberg is absent. Normal pull test. Able to rise from chair without using arms.        Lab Results   Component Value Date    WBC 4.91 08/21/2024    HGB 9.3 (L) 08/21/2024    HCT 30.2 (L) 08/21/2024    MCV 78 (L) 08/21/2024     (H) 08/21/2024       Sodium   Date Value Ref Range Status   08/21/2024 138 136 - 145 mmol/L Final     Potassium   Date Value Ref Range Status   08/21/2024 4.3 3.5 - 5.1 mmol/L Final     Chloride   Date Value Ref Range Status   08/21/2024 108 95 - 110 mmol/L Final     CO2   Date Value Ref Range Status   08/21/2024 25 23 - 29 mmol/L Final     Glucose   Date Value Ref Range Status   08/21/2024 93 70 - 110 mg/dL Final     BUN   Date Value Ref Range Status   08/21/2024 11 6 - 20 mg/dL Final     Creatinine   Date Value Ref Range Status   08/21/2024 0.9 0.5 - 1.4 mg/dL Final     Calcium   Date Value Ref Range Status   08/21/2024 9.1 8.7 - 10.5 mg/dL Final     Total Protein   Date Value Ref Range Status   08/21/2024 6.1 6.0 - 8.4 g/dL Final     Albumin   Date Value Ref Range Status   08/21/2024 3.1 (L) 3.5 - 5.2 g/dL Final     Total Bilirubin   Date Value Ref Range Status   08/21/2024 0.3 0.1 - 1.0 mg/dL Final     Comment:     For infants and newborns, interpretation of results should be based  on gestational age, weight and in agreement with clinical  observations.    Premature Infant recommended reference ranges:  Up to 24 hours.............<8.0 mg/dL  Up to 48 hours............<12.0 mg/dL  3-5 days..................<15.0 mg/dL  6-29 days.................<15.0 mg/dL       Alkaline Phosphatase   Date Value Ref Range Status   08/21/2024 74 55 - 135 U/L Final     AST   Date Value Ref Range Status   08/21/2024 19 10 - 40 U/L Final     ALT   Date Value Ref Range Status   08/21/2024 21 10 - 44 U/L Final     Anion Gap   Date Value Ref Range Status   08/21/2024  "5 (L) 8 - 16 mmol/L Final       Lab Results   Component Value Date    [id number] 582 11/04/2024       Lab Results   Component Value Date    TSH 0.935 08/21/2024       No results found in the last 24 hours.    No results found in the last 24 hours.    Reviewed the neuroimaging independently       Assessment:   39 Years old Female with PMH as above came for an evaluation of "Headaches".    1. Intractable chronic migraine without aura and without status migrainosus        2. Post-concussion headache        3. Injury of head, subsequent encounter        4. Spell of loss of consciousness        5. Double vision with both eyes open        6. Blurry vision, bilateral        7. Neck pain, bilateral        8. Cervical radiculopathy        9. Dizziness and giddiness        10. Nausea        11. Tinnitus of both ears        12. Other amnesia        13. 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 Bulging of cervical intervertebral disc        15. Neural foraminal stenosis of cervical spine        16. Vitamin B1 deficiency        17. Anxiety             Plan:   Patient Neurological Assessment is non-focal.  Patient's history and physical exam point to rule out seizures, postconcussion headache, migraines, vestibular migraine, BPPV, cervical stenosis.       Intractable chronic migraine without aura and without status migrainosus  / Post-concussion headache / Injury of head, subsequent encounter     -Continue Topamax 25 mg PO BID for headache prevention therapy and seizure prophylaxis.  Side effects discussed.  Patient verbalized understanding.  No past medical history of kidney stones or glaucoma per patient. Patient is receptive to starting medication. No refills needed.     -Continue rizatriptan 10 mg daily p.r.n. for headache abortive therapy as previously prescribed.  Side effects discussed.  Patient verbalized understanding.  Patient reports she has not started taking medication yet.  She is receptive to trialing " therapy. No refills needed.     -Continue ibuprofen 800 mg p.o. every 8 hours p.r.n. for headache abortive therapy.  Side effects discussed.  Patient verbalized understanding. Refills sent.     -Continue PT therapy for migraine prevention therapy / vestibular therapy / neck therapy    -Reviewed results of brain MRI without contrast / MAGGY / ESR / CRP / HCG / FA / Homocysteine / RPR / B1 / B12. Patient verbalized understanding.       Spell of loss of consciousness     -Continue routine EEG to rule out seizure activity.     -No driving policies discussed until seizures are ruled out.  Patient verbalized full understanding.      Double vision with both eyes open / Blurry vision, bilateral     -Continue ophthalmology referral for further evaluation and recommendation of patient's visual symptoms.      Neck pain, bilateral / Cervical radiculopathy / Dizziness and giddiness     -Reviewed results of MRI cervical spine without contrast / EMG-NCS.  Patient verbalized understanding.        Nausea     -Continue Zofran-ODT 4 mg PO Q8H PRN    Tinnitus of both ears     -Continue ENT referral for evaluation of tinnitus today - patient now receptive.     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                      LABORATORY EVALUATION    Labs: (2024)  MAGGY / ESR / CRP / HCG / FA / Homocysteine / RPR / B1 / B12 / TSH / CBC / CMP / A1C / HIV / Lipid Panel / Hepatitis C Ab   -personally reviewed -non-significant abnormalities except     Treponema Pallidum Antibodies (IgG, IgM) / FTA-ABS  (Reactive) - Ordered infectious disease referral     B1 (36) - decreased -  Patient never started B1 supplement  / will re-check levels at her follow-up visit.     recommend starting an over-the-counter vitamin B1 supplement, with a suggested intake of 1.1 mg/day for females. In addition to supplementation, discussed that vitamin B1 can be obtained from a variety of dietary sources, including:  · Whole grains and fortified cereals  · Legumes (such as beans and lentils)  · Nuts and seeds  · Pork and fish  · Eggs  · Green vegetables (including spinach and asparagus)    H&H (9.3 / 30.2) Iron & TIBC (18 / 451 ) - Followed by PCP      RADIOLOGY EVALUATION     Lumbar Puncture - done 11/2024 - Opening pressure: 15 cm H2O - no acute abnormalities     EMG-NCS - done 01/2025 -  There is electrodiagnostic evidence of a mild demyelinating median neuropathy (Carpal tunnel syndrome) across the right wrist. There was no evidence of a cervical radiculopathy of the C5-T1 nerve roots  3. There is clinical evidence of cervical myofascial pain    Routine EEG - ordered 11/2024 - pending     Personally reviewed brain MRI without contrast - done 11/2024 - no acute abnormalities    Personally reviewed MRI cervical spine without contrast - done 11/2024 - C5-6 disc bulge mildly indenting the ventral thecal sac and mild right neural foraminal narrowing.  -Ordered Neurosurgery Referral for further evaluation and recommendation.             NEUROPHYSIOLOGY EVALUATION       PATHOLOGY EVALUATION        NEUROCOGNITIVE AND NEUROPSYCHOLOGY EVALUATION                  MIGRAINE, COMMON, WITHOUT AURA, EPISODIC, HIGH FREQUENCY     EVALUATION     OPHTHALMOLOGY EVALUATION    BRAIN MRI WO FOLLOWED BY LP IF INDICATED         MANAGEMENT       HEADACHE DIARY     DISCUSSED THE THREE-FOLD MANAGEMENT OF MIGRAINE:      LIFESTYLE CHANGES:       Good sleep hygiene  Avoid general triggers like lack of sleep/too much sleep, prolonged sun exposure, excessive screen time and specific triggers based on you own diary   Minimize physical and emotional stress  Smoking avoidance and  cessation  Limit caffeine drinks to 1-2 a day   Good hydration   Small frequent meals and avoid skipping meals   Moderate 30-minute-long aerobic exercises 3 times/week. Avoid strenuous exercise         ABORTIVE MEDICATIONS (ACUTE-RESCUE MEDICATIONS):     Should only be taken 2-3 times/week to avoid rebound and overuse headaches.    I-explained to the patient that pain meds especially triptans should NOT be taken daily to avoid Rebound Headache and Overuse Headache.    Take at the ONSET of the headache sumatriptan 100 mg PO  (or other Triptan) in combination with naproxen 500 mg PO or Ibuprofen 800 mg PO for headache without nausea or vomiting.  This regimen can be repeated only once in 24 hours after 2 hours.    Side effects of triptans were discussed and include rare cardiac and cerebral ischemia and cannot be used with migraine associate with focal neurological deficits (complicated migraine) in addition to drowsiness and potential impairment of driving ability. The patient verbalized understanding.    NSAIDs can cause peptic ulcers, renal insufficiency and may increase the risk of cardiovascular diseases.  SEs were discussed with the patient. The patient verbalized understanding.    Triptans have shown to be more effective than Gepatns with more SE/AE.      AVOID NARCOTICS (OPIATES)      1. No randomized controlled study shows pain-free results with opioids in the treatment of migraine.     2. The physiologic consequences of opioid use are adverse, occur quickly, and can be permanent. Decreased gray matter, release of calcitonin gene-related peptide, dynorphin, and pro-inflammatory peptides, and activation of excitatory glutamate receptors are all associated with opioid exposure.     3. Opioids are pro-nociceptive, prevent reversal of migraine central sensitization, and interfere with triptan effectiveness.     4.Opioids precipitate bad clinical outcomes, especially transformation to daily headache.     5. They  cause disease progression, comorbidity, and excessive health care consumption.           NEXT OPTIONS:    Triptans: Sumatriptan (Imitrex), Rizatriptan (Maxalt).    Gepants: Nuretc (rimegepant)75 mg >Ubrelvy (ubrogepant) 100 mg    Ditans: Reyvow (lasmiditan) 100 mg (No driving due to sedation)    Fioricet without codeine with Reglan.      Prednisone with Reglan.      LAST RESORT:     DHE NS Trudhesa (Max 2 a week)     C/I: concomitant use of vasoconstrictors like Triptans, strong CY inhibitors such as HAART PIs (eg, ritonavir, nelfinavir, or indinavir) and Macrolides (eg, erythromycin or clarithromycin), CAD, PVD, Stroke/TIA and Uncontrolled HTN.  Serious SEs include Vasospasm and Fibrosis (chronic use).       IMPENDING STATUS: Prednisone and Vistaril.    STATUS MIGRAINOSUS: ED-Infusion for Status Protocol.        PREVENTATIVE (MORE ACCURATELY MIGRAINE REDUCTION) MEDICATIONS:           Since the patient's headache is very frequent a lengthy discussion about preventative medications was carried out.The patient understands that prevention means DECREASING frequency and severity and NOT elimination.The patient was made aware that any new medication can cause serious allergic reaction.The medication is considered failure only if a therapeutic dose reached and maintained for 6-8 weeks.        HELPFUL SUPPLEMENTS:     Helpful supplements include Co-Q 10, B2, Mg, Feverfew (Dolovent combination) and butterbur (Petadolex)        NEUROPHARMACOLOGY     NEXT OPTIONS:     Topiramate/Topamax (TPM) slow titration to 50 mg BID which can cause mental slowing, transient tingling, kidney stones, weight loss, cleft lip and palate and rarely glaucoma and visual field defects . The patient was encouraged to drink a lot of fluids.     Zonisamide/Zonegran (ZNS) 100-400 mg QHS is a good alternative to TPM in case of SE/AE.     Amitriptyline/Elavil (TCA) slow titration to 100-Age which can cause sleepiness, dry eyes, dry mouth, urinary  retention, and rarely cardiac arrhythmias    Propranolol/Inderal  (BB)slow titration to 80 mg BID which can cause low blood pressure, slow heart rate, erectile dysfunction, depression, airway obstruction and heart failure exacerbations. Cannot be used with migraine associate with focal neurological deficits.    Lamotrigine/Lamictal  (LTG)slow titration to 100 mg BID which can cause serious skin rash and rare cardiac arrhythmias. LTG is superior to other therapies for specifically reducing migraine aura.     ANTI-CGRP AGENTS: Qulipta (alogepant) 60 mg QD, Erenumab (Aimovig) 140 mg SQ Pen monthly (Reported cases of Constipation and BP elevation) , Galcanezumab (Emgality) 120 mg SQ Pen monthly after a loading dose of 240 mg  and Fremnezumab (Ajovy) (Ligand Blocker): 225 mg SQ monthly or 675 mg every 3 months     Botox 200 units every 3 months.         LAST RESORT OPTIONS:      Namenda 10 mg BID     Valproic acid/ Depakote         NEUROMODULATION     Cefaly, Relivion, Nerivio and GammaCore (VNS)               WOMEN IN CHILD BEARING PERIOD     All migraine medications are not safe during pregnancy and the patient was made aware of this fact. Any pregnancy should be planned, and medications should be stopped PRIOR to pregnancy planning. Folic acid 1 mg daily was recommended. However, hormonal birth control complicates the management of migraine and can exacerbate migraine. If possible, mechanical contraception should be a better option.         PREGNANCY ISSUES         We had a lengthy discussion about managing migraine in patients who are trying to get pregnant or pregnant.    First, I recommend FA 1 mg QD     PRN medications are not safe. The safest option is Reglan PRN and not to be taken more than 2-3 times/week. Fioricet PRN is an option.     Traditional preventative options are not safe including Botox. Cefaly and Relivion are considered safe, but insurance does not cover it.        SCHOOL AND WORK  ACCOMMODATIONS        Allow the patient to wear sunglasses or a cap and switch out fluorescent bulbs.    Allow the patient to arrive 5 minutes later and leave 5 minutes earlier to avoid noisy traffic.    Allow the patient to carry a water bottle and refill as needed.    Allow the patient to snack whenever is needed.    Allow the patient to decrease the computer brightness.    Allow the patient to take breaks as needed and extra time for assignments and deadlines.    Allow the patient to avoid strenuous activity as needed.         CONCUSSION EDUCATION:       Concussion occurs at roughly 90 to 100 g-force, which equates to smashing your skull against a wall at 20 mph.    Cognitive and Physical Rest.    Minimize Stimulation.        If symptoms worsen, new symptoms show up or symptoms persist beyond 2 weeks will get Brain MRI (ASL)    If headache continues beyond 2 weeks start: Amitriptyline/Elavil and monitor for sedation.     If cognitive symptoms continue beyond 2 weeks start: Amantadine 100 mg BID.         RETURN TO SCHOOL/SPORT/EXCERCISE AS LONG AS SYMPTOM FREE     MOST CRITICAL IS THE FIRST 3 MONTHS AND ESPECIALLY FIRST 7-10 DAYS       SCHOOL:     Rest for 7 Days     Half Day School for 3 Days         SPORT/EXERCISE:       Rest for 10 Days     Minimal Aerobic Exercise for 3 Days    Moderate Aerobic Exercise for 3 Days    Non-contact Training for 3 Days     Intermittent Contact for 3 Days    Full Contact          MEDICAL/SURGICAL COMORBIDITIES     All relevant medical comorbidities noted and managed by primary care physician and medical care team.          HEALTHY LIFESTYLE AND PREVENTATIVE CARE    The patient to adhere to the age-appropriate health maintenance guidelines including screening tests and vaccinations. The patient to adhere to  healthy lifestyle, optimal weight, exercise, healthy diet, good sleep hygiene and avoiding drugs including smoking, alcohol and recreational drugs.    I spent a total of 51  minutes on the day of the visit.This includes face to face time and non-face to face time preparing to see the patient (eg, review of tests), obtaining and/or reviewing separately obtained history, documenting clinical information in the electronic or other health record, independently interpreting results and communicating results to the patient/family/caregiver, or care coordinator.     Please do not hesitate to contact me with any updates, questions or concerns.    No follow-ups on file.    Willie Berumen MSN, FNP-C    General Neurology

## 2025-02-07 ENCOUNTER — OFFICE VISIT (OUTPATIENT)
Dept: NEUROLOGY | Facility: CLINIC | Age: 40
End: 2025-02-07
Payer: COMMERCIAL

## 2025-02-07 ENCOUNTER — CLINICAL SUPPORT (OUTPATIENT)
Dept: REHABILITATION | Facility: HOSPITAL | Age: 40
End: 2025-02-07
Payer: COMMERCIAL

## 2025-02-07 VITALS
HEART RATE: 67 BPM | BODY MASS INDEX: 30.82 KG/M2 | SYSTOLIC BLOOD PRESSURE: 119 MMHG | RESPIRATION RATE: 16 BRPM | DIASTOLIC BLOOD PRESSURE: 87 MMHG | OXYGEN SATURATION: 99 % | WEIGHT: 173.94 LBS | HEIGHT: 63 IN

## 2025-02-07 DIAGNOSIS — R29.898 DECREASED RANGE OF MOTION OF NECK: ICD-10-CM

## 2025-02-07 DIAGNOSIS — R42 DIZZINESS AND GIDDINESS: ICD-10-CM

## 2025-02-07 DIAGNOSIS — M25.612 DECREASED RANGE OF MOTION OF LEFT SHOULDER: ICD-10-CM

## 2025-02-07 DIAGNOSIS — Z74.09 DECREASED FUNCTIONAL MOBILITY AND ENDURANCE: Primary | ICD-10-CM

## 2025-02-07 DIAGNOSIS — H53.2 DOUBLE VISION WITH BOTH EYES OPEN: ICD-10-CM

## 2025-02-07 DIAGNOSIS — E51.9 VITAMIN B1 DEFICIENCY: ICD-10-CM

## 2025-02-07 DIAGNOSIS — M54.2 NECK PAIN, BILATERAL: ICD-10-CM

## 2025-02-07 DIAGNOSIS — G43.719 INTRACTABLE CHRONIC MIGRAINE WITHOUT AURA AND WITHOUT STATUS MIGRAINOSUS: Primary | ICD-10-CM

## 2025-02-07 DIAGNOSIS — R29.3 POOR POSTURE: ICD-10-CM

## 2025-02-07 DIAGNOSIS — R41.3 OTHER AMNESIA: ICD-10-CM

## 2025-02-07 DIAGNOSIS — F41.9 ANXIETY: ICD-10-CM

## 2025-02-07 DIAGNOSIS — M25.611 DECREASED RANGE OF MOTION OF RIGHT SHOULDER: ICD-10-CM

## 2025-02-07 DIAGNOSIS — M62.81 PROXIMAL MUSCLE WEAKNESS: ICD-10-CM

## 2025-02-07 DIAGNOSIS — M50.30 BULGING OF CERVICAL INTERVERTEBRAL DISC: ICD-10-CM

## 2025-02-07 DIAGNOSIS — M54.12 CERVICAL RADICULOPATHY: ICD-10-CM

## 2025-02-07 DIAGNOSIS — G44.309 POST-CONCUSSION HEADACHE: ICD-10-CM

## 2025-02-07 DIAGNOSIS — R55 SPELL OF LOSS OF CONSCIOUSNESS: ICD-10-CM

## 2025-02-07 DIAGNOSIS — M48.02 NEURAL FORAMINAL STENOSIS OF CERVICAL SPINE: ICD-10-CM

## 2025-02-07 DIAGNOSIS — A53.9 SERUM POSITIVE FOR TREPONEMA PALLIDUM BY PCR: ICD-10-CM

## 2025-02-07 DIAGNOSIS — R11.0 NAUSEA: ICD-10-CM

## 2025-02-07 DIAGNOSIS — H93.13 TINNITUS OF BOTH EARS: ICD-10-CM

## 2025-02-07 DIAGNOSIS — S09.90XD INJURY OF HEAD, SUBSEQUENT ENCOUNTER: ICD-10-CM

## 2025-02-07 DIAGNOSIS — H53.8 BLURRY VISION, BILATERAL: ICD-10-CM

## 2025-02-07 PROCEDURE — 1159F MED LIST DOCD IN RCRD: CPT | Mod: CPTII,S$GLB,,

## 2025-02-07 PROCEDURE — 99215 OFFICE O/P EST HI 40 MIN: CPT | Mod: S$GLB,,,

## 2025-02-07 PROCEDURE — 3074F SYST BP LT 130 MM HG: CPT | Mod: CPTII,S$GLB,,

## 2025-02-07 PROCEDURE — 1160F RVW MEDS BY RX/DR IN RCRD: CPT | Mod: CPTII,S$GLB,,

## 2025-02-07 PROCEDURE — 3008F BODY MASS INDEX DOCD: CPT | Mod: CPTII,S$GLB,,

## 2025-02-07 PROCEDURE — 97530 THERAPEUTIC ACTIVITIES: CPT

## 2025-02-07 PROCEDURE — 97112 NEUROMUSCULAR REEDUCATION: CPT

## 2025-02-07 PROCEDURE — 3079F DIAST BP 80-89 MM HG: CPT | Mod: CPTII,S$GLB,,

## 2025-02-07 PROCEDURE — 99999 PR PBB SHADOW E&M-EST. PATIENT-LVL IV: CPT | Mod: PBBFAC,,,

## 2025-02-07 PROCEDURE — 97140 MANUAL THERAPY 1/> REGIONS: CPT

## 2025-02-07 PROCEDURE — 97110 THERAPEUTIC EXERCISES: CPT

## 2025-02-07 RX ORDER — RIZATRIPTAN BENZOATE 10 MG/1
10 TABLET ORAL DAILY PRN
Qty: 9 TABLET | Refills: 2 | Status: SHIPPED | OUTPATIENT
Start: 2025-02-07 | End: 2025-05-08

## 2025-02-07 RX ORDER — IBUPROFEN 800 MG/1
800 TABLET ORAL EVERY 8 HOURS PRN
Qty: 30 TABLET | Refills: 0 | Status: SHIPPED | OUTPATIENT
Start: 2025-02-07 | End: 2025-03-09

## 2025-02-07 RX ORDER — TOPIRAMATE 25 MG/1
25 TABLET ORAL 2 TIMES DAILY
Qty: 60 TABLET | Refills: 2 | Status: SHIPPED | OUTPATIENT
Start: 2025-02-07 | End: 2025-05-08

## 2025-02-07 NOTE — PROGRESS NOTES
Outpatient Rehab    Physical Therapy Visit    Patient Name: Izzy Horner  MRN: [id number]  YOB: 1985  Today's Date: 2/7/2025    Therapy Diagnosis:   Encounter Diagnoses   Name Primary?    Decreased functional mobility and endurance Yes    Decreased range of motion of neck     Poor posture     Proximal muscle weakness     Decreased range of motion of right shoulder     Decreased range of motion of left shoulder      Physician: Willie Berumen NP    Medical Diagnosis: Intractable chronic migraine without aura and without status migrainosus [G43.719], Dizziness and giddiness [R42]      Visit # / Visits Authorized:  3 / 25 (+1 from evaluation)  Date of Evaluation:  1/28/2025   Insurance Authorization Period: 1/6/2025 - 12/31/2025   Plan of Care Certification:  1/28/2025 to 4/22/2024      Time In:   0900  Time Out:  1003  Total Time:   63  Total Billable Time: 54         Subjective She had a bad headache last night at work and had to go home early. Feels like the loud noise in the building may be what's causing the headache. She is feeling much better overall with decreased frequency and intensity of headaches             Objective            Treatment:  CPT Intervention Performed  Today Duration / Intensity       MT Soft tissue mobilization  x B suboccipitals, paraspinals, upper trapezius and SCM   TE UBE x 3 minutes forward, 3 minutes backwards     Pec corner stretch  X 10x10s holds      Upper Trapezius Stretch x 3 x 30 seconds bilateral    NMR Supine Chin Tucks   2 minutes 3 second holds     Chin tuck and lift  X 2x10      Cervical retractions  X 3x10      B shoulder ER    3 x 10 green band     supine scapular retractions   3 minutes 5 second holds, one second off     series 6- half foam  X  X 1 minutes each  Green band x 30 reps each   B shoulder ER  Pull aparts  Diagonal pull aparts  Flexion     Open books x 2 minutes each bilateral              TA Scapular Retractions  x 3 x 10 5#      Shoulder Extensions x 3 x 10 5#   PLAN  UBE   Upper trapezius stretch   Cervical isometrics   Chin tucks   Supine cervical rotations   Cervical retractions   B shoulder ER   Rows   Shoulder extensions              CPT Codes available for Billing:   (12) minutes of Manual therapy (MT) to improve pain and ROM.  (14) minutes of Therapeutic Exercise (TE) to develop strength, endurance, range of motion, and flexibility.  (20) minutes of Neuromuscular Re-Education (NMR)  to improve: Balance, Coordination, Kinesthetic, Sense, Proprioception, and Posture.  (08) minutes of Therapeutic Activities (TA) to improve functional performance.  Unattended Electrical Stimulation (ES) for muscle performance or pain modulation.  BFR: Blood flow restriction applied during exercise  NP or (-): Not Performed              Patient's spiritual, cultural, and educational needs considered and patient agreeable to plan of care and goals.     Assessment & Plan   Assessment:  Patient tolerated session well today.  Significant decreased muscle tension and tenderness to palpation noted with manual interventions today. Improved range of movement noted with upper trapezius stretch and open books, indicating improved postural mobility .            Plan:      Goals:   Active       Long Term Goal        Pt will report worst pain of 4/10 in order to progress toward max functional ability and improve quality of life          (Progressing)       Start:  01/28/25    Expected End:  04/22/25            Patient will improve shoulder abduction AROM to 180 in order to progress towards independence with functional activities.   (Progressing)       Start:  01/28/25    Expected End:  04/22/25            Patient will improve strength to at least 4+/5 grossly,  in order to improve functional independence and quality of life.   (Progressing)       Start:  01/28/25    Expected End:  04/22/25               Short Term Goal       Pt will report worst pain of 7/10 in order  to progress toward max functional ability and improve quality of life          (Progressing)       Start:  01/28/25    Expected End:  03/11/25            Patient will improve cervical flexion AROM to 60 in order to progress towards independence with functional activities.   (Progressing)       Start:  01/28/25    Expected End:  03/11/25            Patient will improve strength to at least 4+/5 grossly,  in order to improve functional independence and quality of life.   (Progressing)       Start:  01/28/25    Expected End:  03/11/25                Randa Brady PT

## 2025-02-17 ENCOUNTER — CLINICAL SUPPORT (OUTPATIENT)
Dept: REHABILITATION | Facility: HOSPITAL | Age: 40
End: 2025-02-17
Payer: COMMERCIAL

## 2025-02-17 DIAGNOSIS — M25.611 DECREASED RANGE OF MOTION OF RIGHT SHOULDER: ICD-10-CM

## 2025-02-17 DIAGNOSIS — Z74.09 DECREASED FUNCTIONAL MOBILITY AND ENDURANCE: Primary | ICD-10-CM

## 2025-02-17 DIAGNOSIS — M62.81 PROXIMAL MUSCLE WEAKNESS: ICD-10-CM

## 2025-02-17 DIAGNOSIS — M25.612 DECREASED RANGE OF MOTION OF LEFT SHOULDER: ICD-10-CM

## 2025-02-17 DIAGNOSIS — R29.898 DECREASED RANGE OF MOTION OF NECK: ICD-10-CM

## 2025-02-17 DIAGNOSIS — R29.3 POOR POSTURE: ICD-10-CM

## 2025-02-17 PROCEDURE — 97140 MANUAL THERAPY 1/> REGIONS: CPT

## 2025-02-17 PROCEDURE — 97110 THERAPEUTIC EXERCISES: CPT

## 2025-02-17 PROCEDURE — 97112 NEUROMUSCULAR REEDUCATION: CPT

## 2025-02-17 PROCEDURE — 97530 THERAPEUTIC ACTIVITIES: CPT

## 2025-02-17 NOTE — PROGRESS NOTES
Outpatient Rehab    Physical Therapy Visit    Patient Name: Izzy Horner  MRN: [id number]  YOB: 1985  Today's Date: 2/17/2025    Therapy Diagnosis:   Encounter Diagnoses   Name Primary?    Decreased functional mobility and endurance Yes    Decreased range of motion of neck     Poor posture     Proximal muscle weakness     Decreased range of motion of right shoulder     Decreased range of motion of left shoulder      Physician: Willie Berumen NP    Medical Diagnosis: Intractable chronic migraine without aura and without status migrainosus [G43.719], Dizziness and giddiness [R42]      Visit # / Visits Authorized:  4 / 25 (+1 from evaluation)  Date of Evaluation:  1/28/2025   Insurance Authorization Period: 1/6/2025 - 12/31/2025   Plan of Care Certification:  1/28/2025 to 4/22/2024      Time In: 1030   Time Out: 1131  Total Time: 61   Total Billable Time: 54    FOTO:  Intake Score:  %  Survey Score 1:  %  Survey Score 2:  %         Subjective   She is feeling okay today but notes that she had to miss her last session due to a headache. Does feel like the frequency of her headaches is improving.         Objective            Treatment:  CPT Intervention Performed  Today Duration / Intensity       MT Soft tissue mobilization  x B suboccipitals, paraspinals, upper trapezius and SCM   TE UBE x 3 minutes forward, 3 minutes backwards     Pec corner stretch  X 10x10s holds      Upper Trapezius Stretch   3 x 30 seconds bilateral      First rib stretch  x 2x30s b    NMR Supine Chin Tucks   2 minutes 3 second holds     Chin tuck and lift  X 2x10      Cervical retractions  X 3x10      B shoulder ER    3 x 10 green band     supine scapular retractions   3 minutes 5 second holds, one second off     series 6- half foam  X    1 minutes each  Green band x 30 reps each   B shoulder ER  Pull aparts  Diagonal pull aparts  Flexion     Open books   2 minutes each bilateral              TA Scapular Retractions  x  3 x 10 7.5#     D1 Shoulder Extensions x 3 x 10 5#   PLAN  UBE   Upper trapezius stretch   Cervical isometrics   Chin tucks   Supine cervical rotations   Cervical retractions   B shoulder ER   Rows   Shoulder extensions              CPT Codes available for Billing:   (14) minutes of Manual therapy (MT) to improve pain and ROM.  (14) minutes of Therapeutic Exercise (TE) to develop strength, endurance, range of motion, and flexibility.  (18) minutes of Neuromuscular Re-Education (NMR)  to improve: Balance, Coordination, Kinesthetic, Sense, Proprioception, and Posture.  (08) minutes of Therapeutic Activities (TA) to improve functional performance.  Unattended Electrical Stimulation (ES) for muscle performance or pain modulation.  BFR: Blood flow restriction applied during exercise  NP or (-): Not Performed            Assessment & Plan   Assessment: Patient tolerated session well today.  Increased resistance with rows and incorporated D1 extensions to improve functional upper extremity strength. Added a first rib stretch to improve cervical and rib mobility. Pt notes reproduction of headache with this stretch.       Patient will continue to benefit from skilled outpatient physical therapy to address the deficits listed in the problem list box on initial evaluation, provide pt/family education and to maximize pt's level of independence in the home and community environment.     Patient's spiritual, cultural, and educational needs considered and patient agreeable to plan of care and goals.           Plan: Continue Plan of Care (POC) and progress per patient tolerance. See treatment section for details on planned progressions next session.    Goals:   Active       Long Term Goal        Pt will report worst pain of 4/10 in order to progress toward max functional ability and improve quality of life          (Progressing)       Start:  01/28/25    Expected End:  04/22/25            Patient will improve shoulder abduction AROM  to 180 in order to progress towards independence with functional activities.   (Progressing)       Start:  01/28/25    Expected End:  04/22/25            Patient will improve strength to at least 4+/5 grossly,  in order to improve functional independence and quality of life.   (Progressing)       Start:  01/28/25    Expected End:  04/22/25               Short Term Goal       Pt will report worst pain of 7/10 in order to progress toward max functional ability and improve quality of life          (Progressing)       Start:  01/28/25    Expected End:  03/11/25            Patient will improve cervical flexion AROM to 60 in order to progress towards independence with functional activities.   (Progressing)       Start:  01/28/25    Expected End:  03/11/25            Patient will improve strength to at least 4+/5 grossly,  in order to improve functional independence and quality of life.   (Progressing)       Start:  01/28/25    Expected End:  03/11/25                Randa Brady, PT

## 2025-03-31 ENCOUNTER — PATIENT MESSAGE (OUTPATIENT)
Dept: NEUROLOGY | Facility: CLINIC | Age: 40
End: 2025-03-31
Payer: COMMERCIAL

## 2025-03-31 ENCOUNTER — TELEPHONE (OUTPATIENT)
Dept: NEUROLOGY | Facility: CLINIC | Age: 40
End: 2025-03-31
Payer: COMMERCIAL

## 2025-03-31 NOTE — TELEPHONE ENCOUNTER
Called patient to reschedule appt due to provider being out office. Patient did not answer. No option to leave vm. Sent message in portal.

## 2025-04-10 ENCOUNTER — PATIENT MESSAGE (OUTPATIENT)
Dept: NEUROLOGY | Facility: CLINIC | Age: 40
End: 2025-04-10
Payer: COMMERCIAL

## 2025-05-01 ENCOUNTER — TELEPHONE (OUTPATIENT)
Dept: NEUROLOGY | Facility: CLINIC | Age: 40
End: 2025-05-01
Payer: COMMERCIAL

## 2025-05-02 ENCOUNTER — TELEPHONE (OUTPATIENT)
Dept: NEUROSURGERY | Facility: CLINIC | Age: 40
End: 2025-05-02
Payer: COMMERCIAL

## 2025-05-02 ENCOUNTER — TELEPHONE (OUTPATIENT)
Dept: PSYCHIATRY | Facility: CLINIC | Age: 40
End: 2025-05-02
Payer: COMMERCIAL

## 2025-05-02 ENCOUNTER — OFFICE VISIT (OUTPATIENT)
Dept: NEUROLOGY | Facility: CLINIC | Age: 40
End: 2025-05-02
Payer: COMMERCIAL

## 2025-05-02 ENCOUNTER — PATIENT MESSAGE (OUTPATIENT)
Dept: NEUROSURGERY | Facility: CLINIC | Age: 40
End: 2025-05-02
Payer: COMMERCIAL

## 2025-05-02 VITALS
HEART RATE: 71 BPM | WEIGHT: 171.75 LBS | HEIGHT: 63 IN | BODY MASS INDEX: 30.43 KG/M2 | SYSTOLIC BLOOD PRESSURE: 124 MMHG | OXYGEN SATURATION: 99 % | RESPIRATION RATE: 16 BRPM | DIASTOLIC BLOOD PRESSURE: 83 MMHG

## 2025-05-02 DIAGNOSIS — G43.719 INTRACTABLE CHRONIC MIGRAINE WITHOUT AURA AND WITHOUT STATUS MIGRAINOSUS: Primary | ICD-10-CM

## 2025-05-02 DIAGNOSIS — M48.02 NEURAL FORAMINAL STENOSIS OF CERVICAL SPINE: ICD-10-CM

## 2025-05-02 DIAGNOSIS — R11.0 NAUSEA: ICD-10-CM

## 2025-05-02 DIAGNOSIS — E51.9 VITAMIN B1 DEFICIENCY: ICD-10-CM

## 2025-05-02 DIAGNOSIS — R41.3 OTHER AMNESIA: ICD-10-CM

## 2025-05-02 DIAGNOSIS — R55 SPELL OF LOSS OF CONSCIOUSNESS: ICD-10-CM

## 2025-05-02 DIAGNOSIS — M54.2 NECK PAIN, BILATERAL: ICD-10-CM

## 2025-05-02 DIAGNOSIS — R42 DIZZINESS AND GIDDINESS: ICD-10-CM

## 2025-05-02 DIAGNOSIS — H93.13 TINNITUS OF BOTH EARS: ICD-10-CM

## 2025-05-02 DIAGNOSIS — H53.2 DOUBLE VISION WITH BOTH EYES OPEN: ICD-10-CM

## 2025-05-02 DIAGNOSIS — A53.9 SERUM POSITIVE FOR TREPONEMA PALLIDUM BY PCR: ICD-10-CM

## 2025-05-02 DIAGNOSIS — S09.90XD INJURY OF HEAD, SUBSEQUENT ENCOUNTER: ICD-10-CM

## 2025-05-02 DIAGNOSIS — H53.8 BLURRY VISION, BILATERAL: ICD-10-CM

## 2025-05-02 DIAGNOSIS — M50.30 BULGING OF CERVICAL INTERVERTEBRAL DISC: ICD-10-CM

## 2025-05-02 DIAGNOSIS — M54.12 CERVICAL RADICULOPATHY: ICD-10-CM

## 2025-05-02 DIAGNOSIS — G44.309 POST-CONCUSSION HEADACHE: ICD-10-CM

## 2025-05-02 DIAGNOSIS — G56.01 CARPAL TUNNEL SYNDROME, RIGHT: ICD-10-CM

## 2025-05-02 DIAGNOSIS — F41.9 ANXIETY: ICD-10-CM

## 2025-05-02 PROCEDURE — 99999 PR PBB SHADOW E&M-EST. PATIENT-LVL V: CPT | Mod: PBBFAC,,,

## 2025-05-02 RX ORDER — RIZATRIPTAN BENZOATE 10 MG/1
10 TABLET ORAL DAILY PRN
Qty: 18 TABLET | Refills: 2 | Status: SHIPPED | OUTPATIENT
Start: 2025-05-02 | End: 2025-07-31

## 2025-05-02 RX ORDER — TOPIRAMATE 25 MG/1
25 TABLET ORAL 2 TIMES DAILY
Qty: 60 TABLET | Refills: 2 | Status: SHIPPED | OUTPATIENT
Start: 2025-05-02 | End: 2025-07-31

## 2025-05-02 RX ORDER — BUTALBITAL, ACETAMINOPHEN AND CAFFEINE 300; 40; 50 MG/1; MG/1; MG/1
CAPSULE ORAL
COMMUNITY
Start: 2025-02-20 | End: 2025-05-02

## 2025-05-02 RX ORDER — IBUPROFEN 800 MG/1
800 TABLET ORAL EVERY 8 HOURS PRN
Qty: 30 TABLET | Refills: 2 | Status: SHIPPED | OUTPATIENT
Start: 2025-05-02 | End: 2025-07-31

## 2025-05-02 NOTE — PROGRESS NOTES
"Subjective:       Patient ID: Izzy Horner is a 39 y.o. female.      Chief Complaint: "Headaches".        HPI    HPI 39 Years old Female with PMHx of anemia  and other medical conditions came for the follow-up evaluation and recommendation of "Headaches".      Interval History: (11/04/2024) - Ordered ENT referral / ophthalmology referral / routine EEG / brain MRI without contrast / MRI cervical spine without contrast / PT therapy / MGAGY / ESR / CRP / HCG / FA / Homocysteine / RPR / B1 / B12. Started Topamax 25 mg PO BID. Continued rizatriptan 10 mg daily p.r.n and ibuprofen 800 mg p.o. every 8 hours p.r.n.       Headache History:    Onset: The patient began experiencing "Headaches" in June 2024 following a head injury sustained at work, where a knob from a paper roll fell off the glider and struck her left supraorbital region per patient. She reports that after the impact, she stumbled to a chair, experiencing dizziness and briefly losing consciousness for a few seconds; however, she managed to sit in the chair. No seizures at the time of head injury. No open wounds or skull fracture at the time of the head injury. No loss of vision or hearing at the time of head injury. No paralysis or weakness at the time of head injury. No vomiting at the time of head injury. The patient was evaluated at [facility], where X-rays and a head CT scan were performed, both of which returned normal results according to her report. Following the incident, she noted residual dizziness and headache pain. One week later, she developed bilateral floaters, bilateral tinnitus, and brain fog per patient.     Description: Headaches are pressure, aching, stabbing, and throbbing-like, building up slowly towards the night and early morning. Headaches do wake them up from sleep. They are progressively worsening and interfering with daily activities.    Timing:  Duration of 3 hours.    Frequency: Intermittent, headaches reported 3 " "times per week, and 5 per month are migraine-like.    Pain Severity: Increasing in severity, rated 8-10/10, causing significant morbidity.    Location: Bilateral frontal and temporal areas / Usually starts behind left supraorbital region and radiates to left generalized lobes ending to occipital area.    Family History: No family history of early dementia or Migraines.     Medications: Advil 800 mg Q8H PRN - not helping     Worsening Factors: None / Denies physical and emotional stressors. No specific food triggers identified.    Alleviating Factors: Dark and quiet room     Associated Symptoms: Bilateral eye pressure, light and sound sensitivity, dizziness she means "light-headed", nausea, does report scalp sensitivity to left occipital area, Neck Pain with radiation to the left upper extremity, bilateral hand numbness / tingling, bilateral blurry vision, double vision, and floaters, bilateral ear ringing, spells of loss of awareness where she stares into space since head injury, brain fog.     Pertinent Negative Symptoms: No other associated neurological deficits    Positional/Behavioral Factors: Headaches are positional and postural, worsened by movement and bending the head downward. Denies worsening with Valsalva maneuver.    Triggers: Bending head in a downward position.     Prodromal Symptoms: No visual aura, irritability, or urinary frequency.    Exclusions: No TMJ issues, seizures, smoking history, caffeine overuse, vertigo, blackouts, fever, chills, or significant memory loss. No history of strokes or falls. No prior history of prior TBI. No premorbid history of anxiety-depression. No history off premorbid alcoholism. No premorbid history of migraine headaches. No premorbid history of neck or back pains.  No symptoms of sleep apnea (e.g., snoring, gasping, frequent nighttime awakening, daytime fatigue).         Interval History:  (12/03/2024) - Continued Topamax 25 mg PO BID, rizatriptan 10 mg daily p.r.n., " "ibuprofen 800 mg p.o. every 8 hours p.r.n., routine EEG, EMG-NCS, Ophthalmology, ENT,  infectious disease, neurosurgery, Psychiatry, and PT Referrals.     Headaches have slightly improved per patient since last visit. She reports a reduced frequency due to taking Ibuprofen PRN.   Location is Bilateral frontal and temporal areas / Usually starts behind left supraorbital region and radiates to left generalized lobes ending to occipital area.  No Headache auras   Frequency of tension type is 3 times per week and migraine type is 4-5 per month.   Duration of tension type is 3 hours and migraine type is 12 hours.   Still "pressure, aching, stabbing, and throbbing-like" in nature  Pain intensity is still 8-10/10  Worsened by loud noises  Triggered by Bending head in a downward position / loud noises  Alleviated with Dark and quiet room   Associated symptoms include Bilateral eye pressure, light and sound sensitivity, dizziness she means "light-headed", nausea, does report scalp sensitivity to left occipital area, Neck Pain with radiation to the left upper extremity, bilateral hand numbness / tingling, bilateral blurry vision, double vision, and floaters, bilateral ear ringing, spells of loss of awareness where she stares into space since head injury, brain fog, Anxiety.       Interval History: (01/06/2025) - Continued Topamax 25 mg PO BID, rizatriptan 10 mg daily p.r.n., ibuprofen 800 mg p.o. every 8 hours p.r.n., PT, ophthalmology, Neurosurgery, ID, Psychiatry, & ENT referrals, Routine EEG, and EMG-NCS. Started Zofran-ODT 4 mg PO Q8H PRN.           Headaches have slightly improved per patient since last visit.   Location is Bilateral frontal and temporal areas / Usually starts behind left supraorbital region and radiates to left generalized lobes ending to occipital area.  No Headache auras   Frequency of tension type is 3 times per week and migraine type is 4-5 per month.   Duration of tension type is 3 hours and " "migraine type is 12 hours.   Still "pressure, aching, stabbing, and throbbing-like" in nature  Pain intensity is still 8-10/10  Worsened by loud noises  Triggered by Bending head in a downward position / loud noises  Alleviated with Dark and quiet room     Associated symptoms include Bilateral eye pressure, light and sound sensitivity, dizziness she means "light-headed", nausea, does report scalp sensitivity to left occipital area, Neck Pain with radiation to the left upper extremity, bilateral hand numbness / tingling, bilateral blurry vision, double vision, and floaters, bilateral ear ringing, spells of loss of awareness where she stares into space since head injury, brain fog, Anxiety.     Pertinent Negative Symptoms: No other associated neurological deficits          Interval History: (02/07/2025) - Continued Zofran-ODT 4 mg PO Q8H PRN, Topamax 25 mg PO BID, rizatriptan 10 mg daily p.r.n., ibuprofen 800 mg p.o. every 8 hours p.r.n., PT, ophthalmology, Neurosurgery, ID, Psychiatry, & ENT referrals, Routine EEG.     Headaches have significantly improved per patient since last visit. She reports a reduced frequency due to taking Ibuprofen PRN, PT, and dietary changes (Vegan).   Location is Bilateral frontal and temporal areas / Usually starts behind left supraorbital region and radiates to left generalized lobes ending to occipital area.  No Headache auras   Frequency -decreased -  tension type is 1 per month and migraine type is 2 per month.   Duration  -decreased tension type is 2-3 hours and migraine type is 12 hours.   Still "pressure, aching, stabbing, and throbbing-like" in nature  Pain intensity is decreased 7/10  Worsened by loud noises  Triggered by Bending head in a downward position / loud noises  Alleviated with Dark and quiet room     Associated symptoms include Bilateral eye pressure, light and sound sensitivity, dizziness she means "light-headed", nausea, does report scalp sensitivity to left " "occipital area, Neck Pain with radiation to the left upper extremity, bilateral hand numbness / tingling, bilateral blurry vision, double vision, and floaters, bilateral ear ringing, spells of loss of awareness where she stares into space since head injury, brain fog, Anxiety.     Pertinent Negative Symptoms: No other associated neurological deficits            New Issues: (05/02/2025) -     No new issues per patient.     Medications:  Topamax 25 mg PO BID:   Rizatriptan 10 mg daily p.r.n.   Ibuprofen 800 mg PO every 8 hours p.r.n.  Zofran-ODT 4 mg PO Q8H PRN  Patient is tolerating all medications well without side effects. She reports taking it as prescribed.    Headaches have significantly improved per patient since last visit. She reports a reduced frequency due to taking Ibuprofen PRN, PT, and dietary changes (Vegan).   Location is Bilateral frontal and temporal areas / Usually starts behind left supraorbital region and radiates to left generalized lobes ending to occipital area.  No Headache auras   Frequency -decreased -  tension type is 1 per month and migraine type is 2-4 per month.   Duration  -decreased tension type is 2-3 hours and migraine type is 12 hours.   Still "pressure, aching, stabbing, and throbbing-like" in nature  Pain intensity is decreased 5/10  Worsened by loud noises  Triggered by Bending head in a downward position / loud noises  Alleviated with Dark and quiet room     Associated symptoms include Bilateral eye pressure, light and sound sensitivity, dizziness she means "light-headed", nausea, does report scalp sensitivity to left occipital area, Neck Pain with radiation to the left upper extremity, bilateral hand numbness / tingling, bilateral blurry vision, double vision, and floaters, bilateral ear ringing, spells of loss of awareness where she stares into space since head injury, brain fog, Anxiety.     Pertinent Negative Symptoms: No other associated neurological deficits, vomiting, " balance issues, acute thunderclap onset, focal or bilateral limb weakness      Referrals:  ENT Referral: no acute abnormalities per patient.   Ophthalmology Referral: Patient was seen in 12/2024, and no acute abnormalities were reported. Examination revealed normal intraocular pressures, no papilledema, and no other abnormalities per patient.  Physical Therapy (PT): Patient reports significant improvement in neck tightness and headaches with physical therapy.  Neurosurgery: Pending evaluation for cervical radiculopathy and abnormal C-spine MRI  Infectious Disease: Patient was seen in 12/2024 and received a dose of [place] Penicillin G Benzathine injection, 100,000 units.  Psychiatry: Pending evaluation, with an upcoming appointment in 02/26/2025.  EMG-NCS for Cervical Radiculopathy - complete    Abortive therapies (tried and failed): Ibuprofen 800 mg PO every 8 hours p.r.n. / Rizatriptan - Current      Preventative therapies (tried and failed): Topamax - Current     Pregnancy and birth control: None         Review of Systems   Constitutional:  Negative for activity change, appetite change, chills, diaphoresis, fatigue, fever and unexpected weight change.   HENT:  Positive for tinnitus. Negative for congestion, dental problem, drooling, ear discharge, ear pain, facial swelling, hearing loss, mouth sores, nosebleeds, postnasal drip, rhinorrhea, sinus pressure, sinus pain, sneezing, sore throat, trouble swallowing and voice change.    Eyes:  Positive for photophobia and visual disturbance. Negative for pain, discharge, redness and itching.        Bilateral eye pressure   Respiratory:  Negative for cough, chest tightness, shortness of breath and wheezing.    Cardiovascular:  Negative for chest pain, palpitations and leg swelling.   Gastrointestinal:  Positive for nausea. Negative for abdominal distention, abdominal pain, blood in stool, constipation, diarrhea and vomiting.   Endocrine: Negative for cold intolerance, heat  intolerance, polydipsia, polyphagia and polyuria.   Genitourinary:  Negative for decreased urine volume, difficulty urinating, dysuria, flank pain, frequency, hematuria, pelvic pain, urgency and vaginal discharge.   Musculoskeletal:  Positive for neck pain. Negative for arthralgias, back pain, gait problem, joint swelling, myalgias and neck stiffness.        Neck Pain with radiation to the left upper extremity   Skin:  Negative for color change and rash.   Allergic/Immunologic: Negative for immunocompromised state.   Neurological:  Positive for dizziness, light-headedness, numbness and headaches. Negative for tremors, seizures, syncope, facial asymmetry, speech difficulty and weakness.        Patient does report scalp sensitivity to left occipital area    Patient spells of loss of awareness where she stares into space since head injury   Hematological:  Negative for adenopathy. Does not bruise/bleed easily.   Psychiatric/Behavioral:  Negative for agitation, behavioral problems, confusion, decreased concentration, dysphoric mood, hallucinations, self-injury, sleep disturbance and suicidal ideas. The patient is nervous/anxious. The patient is not hyperactive.    All other systems reviewed and are negative.                Current Outpatient Medications:     ferrous sulfate 325 (65 FE) MG EC tablet, Take 1 tablet (325 mg total) by mouth once daily., Disp: 30 tablet, Rfl: 5    rizatriptan (MAXALT) 10 MG tablet, Take 1 tablet (10 mg total) by mouth daily as needed for Migraine. If symptoms persist or return, may repeat dose after 2 hours. Do not exceed more than 2 tablets in 24hr period, Disp: 9 tablet, Rfl: 2    topiramate (TOPAMAX) 25 MG tablet, Take 1 tablet (25 mg total) by mouth 2 (two) times daily., Disp: 60 tablet, Rfl: 2    Past Medical History:   Diagnosis Date    Anemia     Encounter for blood transfusion        No past surgical history on file.    Social History     Socioeconomic History    Marital status:  Single   Tobacco Use    Smoking status: Never    Smokeless tobacco: Never   Substance and Sexual Activity    Alcohol use: No     Comment: OCC    Drug use: No    Sexual activity: Yes     Partners: Male     Birth control/protection: None         Past/Current Medical/Surgical History, Past/Current Social History, Past/Current Family History and Past/Current Medications were reviewed in detail.    Objective:           VITAL SIGNS WERE REVIEWED      GENERAL APPEARANCE:     The patient looks comfortable.    BMI    No signs of respiratory distress.    Normal breathing pattern.    No dysmorphic features    Normal eye contact.       GENERAL MEDICAL EXAM:    HEENT:  Head is atraumatic normocephalic.     FUNDUSCOPIC (OPHTHALMOSCOPIC) EXAMINATION showed no disc edema (papilledema).      NECK: No JVD. No visible lesions or goiters.     CHEST-CARDIOPULMONARY: No cyanosis. No tachypnea. Normal respiratory effort.    [id number]: No jaundice. No stomas or lesions. No visible hernias. No catheters.     SKIN, HAIR, NAILS: No pathognomonic skin rash.No neurofibromatosis. No visible lesions.No stigmata of autoimmune disease. No clubbing.    LIMBS: No varicose veins. No visible swelling.    MUSCULOSKELETAL: No visible deformities.No visible lesions.             Neurological Exam  Mental Status  Awake, alert and oriented to person, place and time. Oriented to person, place, time and situation. Recent and remote memory are intact. At 5 minutes recalls 3 of 3 objects. Speech is normal. Language is fluent with no aphasia. Attention and concentration are normal. Fund of knowledge is appropriate for level of education. Apraxia absent.    Cranial Nerves  CN I: Sense of smell is normal.  CN II: Visual acuity is normal. Visual fields full to confrontation. Right funduscopic exam: disc intact. Left funduscopic exam: disc intact.  CN III, IV, VI: Extraocular movements intact bilaterally. Normal lids and orbits bilaterally.  Pupils equal round and reactive to light bilaterally.  CN V: Facial sensation is normal.  CN VII: Full and symmetric facial movement.  CN VIII: Hearing is normal.  CN IX, X: Palate elevates symmetrically. Normal gag reflex.  CN XI: Shoulder shrug strength is normal.  CN XII: Tongue midline without atrophy or fasciculations.    Motor  Normal muscle bulk throughout. No fasciculations present. Normal muscle tone. No abnormal involuntary movements. Strength is 5/5 throughout all four extremities.    Sensory  Sensation is intact to light touch, pinprick, vibration and proprioception in all four extremities.    Reflexes                                            Right                      Left  Brachioradialis                    2+                         2+  Biceps                                 2+                         2+  Triceps                                2+                         2+  Finger flex                           2+                         2+  Hamstring                            2+                         2+  Patellar                                2+                         2+  Achilles                                2+                         2+    Coordination  Right: Finger-to-nose normal. Rapid alternating movement normal. Heel-to-shin normal.Left: Finger-to-nose normal. Rapid alternating movement normal. Heel-to-shin normal.    Gait  Casual gait is normal including stance, stride, and arm swing.Normal toe walking. Normal heel walking. Normal tandem gait. Romberg is absent. Normal pull test. Able to rise from chair without using arms.        Lab Results   Component Value Date    WBC 4.91 08/21/2024    HGB 9.3 (L) 08/21/2024    HCT 30.2 (L) 08/21/2024    MCV 78 (L) 08/21/2024     (H) 08/21/2024       Sodium   Date Value Ref Range Status   08/21/2024 138 136 - 145 mmol/L Final     Potassium   Date Value Ref Range Status   08/21/2024 4.3 3.5 - 5.1 mmol/L Final     Chloride   Date Value Ref  "Range Status   08/21/2024 108 95 - 110 mmol/L Final     CO2   Date Value Ref Range Status   08/21/2024 25 23 - 29 mmol/L Final     Glucose   Date Value Ref Range Status   08/21/2024 93 70 - 110 mg/dL Final     BUN   Date Value Ref Range Status   08/21/2024 11 6 - 20 mg/dL Final     Creatinine   Date Value Ref Range Status   08/21/2024 0.9 0.5 - 1.4 mg/dL Final     Calcium   Date Value Ref Range Status   08/21/2024 9.1 8.7 - 10.5 mg/dL Final     Total Protein   Date Value Ref Range Status   08/21/2024 6.1 6.0 - 8.4 g/dL Final     Albumin   Date Value Ref Range Status   08/21/2024 3.1 (L) 3.5 - 5.2 g/dL Final     Total Bilirubin   Date Value Ref Range Status   08/21/2024 0.3 0.1 - 1.0 mg/dL Final     Comment:     For infants and newborns, interpretation of results should be based  on gestational age, weight and in agreement with clinical  observations.    Premature Infant recommended reference ranges:  Up to 24 hours.............<8.0 mg/dL  Up to 48 hours............<12.0 mg/dL  3-5 days..................<15.0 mg/dL  6-29 days.................<15.0 mg/dL       Alkaline Phosphatase   Date Value Ref Range Status   08/21/2024 74 55 - 135 U/L Final     AST   Date Value Ref Range Status   08/21/2024 19 10 - 40 U/L Final     ALT   Date Value Ref Range Status   08/21/2024 21 10 - 44 U/L Final     Anion Gap   Date Value Ref Range Status   08/21/2024 5 (L) 8 - 16 mmol/L Final       Lab Results   Component Value Date    HLCMULRR88 582 11/04/2024       Lab Results   Component Value Date    TSH 0.935 08/21/2024       No results found in the last 24 hours.    No results found in the last 24 hours.    Reviewed the neuroimaging independently       Assessment:   39 Years old Female with PMH as above came for an evaluation of "Headaches".    1. Intractable chronic migraine without aura and without status migrainosus  topiramate (TOPAMAX) 25 MG tablet    rizatriptan (MAXALT) 10 MG tablet    ibuprofen (ADVIL,MOTRIN) 800 MG tablet    " Ambulatory Referral/Consult to Physical Therapy      2. Post-concussion headache        3. Injury of head, subsequent encounter        4. Spell of loss of consciousness        5. Double vision with both eyes open        6. Blurry vision, bilateral        7. Neck pain, bilateral  Ambulatory referral/consult to Pain Clinic      8. Cervical radiculopathy        9. Dizziness and giddiness        10. Nausea        11. Tinnitus of both ears        12. Other amnesia        13. 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 Bulging of cervical intervertebral disc        15. Neural foraminal stenosis of cervical spine        16. Vitamin B1 deficiency        17. Anxiety        18. Carpal tunnel syndrome, right             Plan:   Patient Neurological Assessment is non-focal.  Patient's history and physical exam point to rule out seizures, postconcussion headache, migraines, vestibular migraine, BPPV, cervical stenosis.       Intractable chronic migraine without aura and without status migrainosus  / Post-concussion headache / Injury of head, subsequent encounter     -Continue Topamax 25 mg PO BID for headache prevention therapy and seizure prophylaxis.  Side effects discussed.  Patient verbalized understanding.  No past medical history of kidney stones or glaucoma per patient. Patient is receptive to starting medication. No refills needed.     -Continue rizatriptan 10 mg daily p.r.n. for headache abortive therapy as previously prescribed.  Side effects discussed.  Patient verbalized understanding.  Patient reports she has not started taking medication yet.  She is receptive to trialing therapy. No refills needed.     -Continue ibuprofen 800 mg p.o. every 8 hours p.r.n. for headache abortive therapy.  Side effects discussed.  Patient verbalized understanding. Refills sent.     -Continue PT therapy for migraine prevention therapy / vestibular therapy / neck therapy    -Reviewed results of brain MRI without contrast /  MAGGY / ESR / CRP / HCG / FA / Homocysteine / RPR / B1 / B12. Patient verbalized understanding.       Spell of loss of consciousness     -Continue routine EEG to rule out seizure activity.     -No driving policies discussed until seizures are ruled out.  Patient verbalized full understanding.      Double vision with both eyes open / Blurry vision, bilateral     -Continue ophthalmology referral for further evaluation and recommendation of patient's visual symptoms.      Neck pain, bilateral / Cervical radiculopathy / Dizziness and giddiness     -Reviewed results of MRI cervical spine without contrast / EMG-NCS.  Patient verbalized understanding.      Nausea     -Continue Zofran-ODT 4 mg PO Q8H PRN    Tinnitus of both ears     -Continue ENT referral for evaluation of tinnitus today - patient now receptive.     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       Anxiety     -Continue Psychiatry Referral for further evaluation and management.       Carpal tunnel syndrome, right     -Offered Hand Speciality referral - patient declines at this time     -CTS exercises provided.        1. Intractable chronic migraine without aura and without status migrainosus  - topiramate (TOPAMAX) 25 MG tablet; Take 1 tablet (25 mg total) by mouth 2 (two) times daily.  Dispense: 60 tablet; Refill: 2  - rizatriptan (MAXALT) 10 MG tablet; Take 1 tablet (10 mg total) by mouth daily as needed for Migraine. If symptoms persist or return, may repeat dose after 2 hours. Do not exceed more than 2 tablets in 24hr period  Dispense: 9 tablet; Refill: 2  - ibuprofen (ADVIL,MOTRIN) 800 MG tablet; Take 1 tablet (800 mg total) by  mouth every 8 (eight) hours as needed for Pain (Migraine).  Dispense: 30 tablet; Refill: 2  - Ambulatory Referral/Consult to Physical Therapy; Future    2. Post-concussion headache    3. Injury of head, subsequent encounter    4. Spell of loss of consciousness    5. Double vision with both eyes open    6. Blurry vision, bilateral    7. Neck pain, bilateral  - Ambulatory referral/consult to Pain Clinic; Future    8. Cervical radiculopathy    9. Dizziness and giddiness    10. Nausea    11. Tinnitus of both ears    12. Other amnesia    13. 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 Bulging of cervical intervertebral disc    15. Neural foraminal stenosis of cervical spine    16. Vitamin B1 deficiency    17. Anxiety    18. Carpal tunnel syndrome, right          LABORATORY EVALUATION    Labs: (2024)  MAGYG / ESR / CRP / HCG / FA / Homocysteine / RPR / B1 / B12 / TSH / CBC / CMP / A1C / HIV / Lipid Panel / Hepatitis C Ab   -personally reviewed -non-significant abnormalities except     Treponema Pallidum Antibodies (IgG, IgM) / FTA-ABS  (Reactive) - Ordered infectious disease referral     B1 (36) - decreased -  Patient never started B1 supplement / will re-check levels at her follow-up visit.     recommend starting an over-the-counter vitamin B1 supplement, with a suggested intake of 1.1 mg/day for females. In addition to supplementation, discussed that vitamin B1 can be obtained from a variety of dietary sources, including:  · Whole grains and fortified cereals  · Legumes (such as beans and lentils)  · Nuts and seeds  · Pork and fish  · Eggs  · Green vegetables (including spinach and asparagus)    H&H (9.3 / 30.2) Iron & TIBC (18 / 451 ) - Followed by PCP      RADIOLOGY EVALUATION     Lumbar Puncture - done 11/2024 - Opening pressure: 15 cm H2O - no acute abnormalities     EMG-NCS - done 01/2025 -  There is electrodiagnostic evidence of a mild demyelinating median neuropathy (Carpal tunnel syndrome) across the right  wrist. There was no evidence of a cervical radiculopathy of the C5-T1 nerve roots  3. There is clinical evidence of cervical myofascial pain     Routine EEG - ordered 11/2024 - pending     Personally reviewed brain MRI without contrast - done 11/2024 - no acute abnormalities    Personally reviewed MRI cervical spine without contrast - done 11/2024 - C5-6 disc bulge mildly indenting the ventral thecal sac and mild right neural foraminal narrowing.  -Ordered Neurosurgery Referral for further evaluation and recommendation.             NEUROPHYSIOLOGY EVALUATION       PATHOLOGY EVALUATION        NEUROCOGNITIVE AND NEUROPSYCHOLOGY EVALUATION                  MIGRAINE, COMMON, WITHOUT AURA, EPISODIC, HIGH FREQUENCY     EVALUATION     OPHTHALMOLOGY EVALUATION    BRAIN MRI WO FOLLOWED BY LP IF INDICATED         MANAGEMENT       HEADACHE DIARY     DISCUSSED THE THREE-FOLD MANAGEMENT OF MIGRAINE:      LIFESTYLE CHANGES:       Good sleep hygiene  Avoid general triggers like lack of sleep/too much sleep, prolonged sun exposure, excessive screen time and specific triggers based on you own diary   Minimize physical and emotional stress  Smoking avoidance and cessation  Limit caffeine drinks to 1-2 a day   Good hydration   Small frequent meals and avoid skipping meals   Moderate 30-minute-long aerobic exercises 3 times/week. Avoid strenuous exercise         ABORTIVE MEDICATIONS (ACUTE-RESCUE MEDICATIONS):     Should only be taken 2-3 times/week to avoid rebound and overuse headaches.    I-explained to the patient that pain meds especially triptans should NOT be taken daily to avoid Rebound Headache and Overuse Headache.    Take at the ONSET of the headache sumatriptan 100 mg PO  (or other Triptan) in combination with naproxen 500 mg PO or Ibuprofen 800 mg PO for headache without nausea or vomiting.  This regimen can be repeated only once in 24 hours after 2 hours.    Side effects of triptans were discussed and include rare cardiac  and cerebral ischemia and cannot be used with migraine associate with focal neurological deficits (complicated migraine) in addition to drowsiness and potential impairment of driving ability. The patient verbalized understanding.    NSAIDs can cause peptic ulcers, renal insufficiency and may increase the risk of cardiovascular diseases.  SEs were discussed with the patient. The patient verbalized understanding.    Triptans have shown to be more effective than Gepatns with more SE/AE.      AVOID NARCOTICS (OPIATES)      1. No randomized controlled study shows pain-free results with opioids in the treatment of migraine.     2. The physiologic consequences of opioid use are adverse, occur quickly, and can be permanent. Decreased gray matter, release of calcitonin gene-related peptide, dynorphin, and pro-inflammatory peptides, and activation of excitatory glutamate receptors are all associated with opioid exposure.     3. Opioids are pro-nociceptive, prevent reversal of migraine central sensitization, and interfere with triptan effectiveness.     4.Opioids precipitate bad clinical outcomes, especially transformation to daily headache.     5. They cause disease progression, comorbidity, and excessive health care consumption.           NEXT OPTIONS:    Triptans: Sumatriptan (Imitrex), Rizatriptan (Maxalt).    Gepants: Nuretc (rimegepant)75 mg >Ubrelvy (ubrogepant) 100 mg    Ditans: Reyvow (lasmiditan) 100 mg (No driving due to sedation)    Fioricet without codeine with Reglan.      Prednisone with Reglan.      LAST RESORT:     DHE NS Trudhesa (Max 2 a week)     C/I: concomitant use of vasoconstrictors like Triptans, strong CY inhibitors such as HAART PIs (eg, ritonavir, nelfinavir, or indinavir) and Macrolides (eg, erythromycin or clarithromycin), CAD, PVD, Stroke/TIA and Uncontrolled HTN.  Serious SEs include Vasospasm and Fibrosis (chronic use).       IMPENDING STATUS: Prednisone and Vistaril.    STATUS MIGRAINOSUS:  ED-Infusion for Status Protocol.        PREVENTATIVE (MORE ACCURATELY MIGRAINE REDUCTION) MEDICATIONS:           Since the patient's headache is very frequent a lengthy discussion about preventative medications was carried out.The patient understands that prevention means DECREASING frequency and severity and NOT elimination.The patient was made aware that any new medication can cause serious allergic reaction.The medication is considered failure only if a therapeutic dose reached and maintained for 6-8 weeks.        HELPFUL SUPPLEMENTS:     Helpful supplements include Co-Q 10, B2, Mg, Feverfew (Dolovent combination) and butterbur (Petadolex)        NEUROPHARMACOLOGY     NEXT OPTIONS:     Topiramate/Topamax (TPM) slow titration to 50 mg BID which can cause mental slowing, transient tingling, kidney stones, weight loss, cleft lip and palate and rarely glaucoma and visual field defects . The patient was encouraged to drink a lot of fluids.     Zonisamide/Zonegran (ZNS) 100-400 mg QHS is a good alternative to TPM in case of SE/AE.     Amitriptyline/Elavil (TCA) slow titration to 100-Age which can cause sleepiness, dry eyes, dry mouth, urinary retention, and rarely cardiac arrhythmias    Propranolol/Inderal  (BB)slow titration to 80 mg BID which can cause low blood pressure, slow heart rate, erectile dysfunction, depression, airway obstruction and heart failure exacerbations. Cannot be used with migraine associate with focal neurological deficits.    Lamotrigine/Lamictal  (LTG)slow titration to 100 mg BID which can cause serious skin rash and rare cardiac arrhythmias. LTG is superior to other therapies for specifically reducing migraine aura.     ANTI-CGRP AGENTS: Qulipta (alogepant) 60 mg QD, Erenumab (Aimovig) 140 mg SQ Pen monthly (Reported cases of Constipation and BP elevation) , Galcanezumab (Emgality) 120 mg SQ Pen monthly after a loading dose of 240 mg  and Fremnezumab (Ajovy) (Ligand Blocker): 225 mg SQ monthly  or 675 mg every 3 months     Botox 200 units every 3 months.         LAST RESORT OPTIONS:      Namenda 10 mg BID     Valproic acid/ Depakote         NEUROMODULATION     Cefaly, Relivion, Nerivio and GammaCore (VNS)               WOMEN IN CHILD BEARING PERIOD     All migraine medications are not safe during pregnancy and the patient was made aware of this fact. Any pregnancy should be planned, and medications should be stopped PRIOR to pregnancy planning. Folic acid 1 mg daily was recommended. However, hormonal birth control complicates the management of migraine and can exacerbate migraine. If possible, mechanical contraception should be a better option.         PREGNANCY ISSUES         We had a lengthy discussion about managing migraine in patients who are trying to get pregnant or pregnant.    First, I recommend FA 1 mg QD     PRN medications are not safe. The safest option is Reglan PRN and not to be taken more than 2-3 times/week. Fioricet PRN is an option.     Traditional preventative options are not safe including Botox. Cefaly and Relivion are considered safe, but insurance does not cover it.        SCHOOL AND WORK ACCOMMODATIONS        Allow the patient to wear sunglasses or a cap and switch out fluorescent bulbs.    Allow the patient to arrive 5 minutes later and leave 5 minutes earlier to avoid noisy traffic.    Allow the patient to carry a water bottle and refill as needed.    Allow the patient to snack whenever is needed.    Allow the patient to decrease the computer brightness.    Allow the patient to take breaks as needed and extra time for assignments and deadlines.    Allow the patient to avoid strenuous activity as needed.         CONCUSSION EDUCATION:       Concussion occurs at roughly 90 to 100 g-force, which equates to smashing your skull against a wall at 20 mph.    Cognitive and Physical Rest.    Minimize Stimulation.        If symptoms worsen, new symptoms show up or symptoms persist beyond 2  weeks will get Brain MRI (ASL)    If headache continues beyond 2 weeks start: Amitriptyline/Elavil and monitor for sedation.     If cognitive symptoms continue beyond 2 weeks start: Amantadine 100 mg BID.         RETURN TO SCHOOL/SPORT/EXCERCISE AS LONG AS SYMPTOM FREE     MOST CRITICAL IS THE FIRST 3 MONTHS AND ESPECIALLY FIRST 7-10 DAYS       SCHOOL:     Rest for 7 Days     Half Day School for 3 Days         SPORT/EXERCISE:       Rest for 10 Days     Minimal Aerobic Exercise for 3 Days    Moderate Aerobic Exercise for 3 Days    Non-contact Training for 3 Days     Intermittent Contact for 3 Days    Full Contact          MEDICAL/SURGICAL COMORBIDITIES     All relevant medical comorbidities noted and managed by primary care physician and medical care team.          HEALTHY LIFESTYLE AND PREVENTATIVE CARE    The patient to adhere to the age-appropriate health maintenance guidelines including screening tests and vaccinations. The patient to adhere to  healthy lifestyle, optimal weight, exercise, healthy diet, good sleep hygiene and avoiding drugs including smoking, alcohol and recreational drugs.    I spent a total of 35 minutes on the day of the visit.This includes face to face time and non-face to face time preparing to see the patient (eg, review of tests), obtaining and/or reviewing separately obtained history, documenting clinical information in the electronic or other health record, independently interpreting results and communicating results to the patient/family/caregiver, or care coordinator.     Please do not hesitate to contact me with any updates, questions or concerns.    No follow-ups on file.    Willie Berumen, MSN, FNP-C    General Neurology

## 2025-05-08 ENCOUNTER — PATIENT MESSAGE (OUTPATIENT)
Dept: RESEARCH | Facility: HOSPITAL | Age: 40
End: 2025-05-08
Payer: COMMERCIAL

## 2025-05-16 ENCOUNTER — CLINICAL SUPPORT (OUTPATIENT)
Dept: REHABILITATION | Facility: HOSPITAL | Age: 40
End: 2025-05-16
Payer: COMMERCIAL

## 2025-05-16 DIAGNOSIS — Z74.09 DECREASED FUNCTIONAL MOBILITY AND ENDURANCE: ICD-10-CM

## 2025-05-16 DIAGNOSIS — R29.898 DECREASED RANGE OF MOTION OF NECK: ICD-10-CM

## 2025-05-16 DIAGNOSIS — R29.898 DECREASED STRENGTH OF UPPER EXTREMITY: ICD-10-CM

## 2025-05-16 DIAGNOSIS — G43.719 INTRACTABLE CHRONIC MIGRAINE WITHOUT AURA AND WITHOUT STATUS MIGRAINOSUS: ICD-10-CM

## 2025-05-16 DIAGNOSIS — M54.2 NECK PAIN: Primary | ICD-10-CM

## 2025-05-16 PROCEDURE — 97140 MANUAL THERAPY 1/> REGIONS: CPT

## 2025-05-16 PROCEDURE — 97112 NEUROMUSCULAR REEDUCATION: CPT

## 2025-05-16 PROCEDURE — 97162 PT EVAL MOD COMPLEX 30 MIN: CPT
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
## 2025-05-20 ENCOUNTER — PATIENT MESSAGE (OUTPATIENT)
Dept: NEUROLOGY | Facility: CLINIC | Age: 40
End: 2025-05-20
Payer: COMMERCIAL

## 2025-05-23 ENCOUNTER — CLINICAL SUPPORT (OUTPATIENT)
Dept: REHABILITATION | Facility: HOSPITAL | Age: 40
End: 2025-05-23
Payer: COMMERCIAL

## 2025-05-23 DIAGNOSIS — R29.898 DECREASED STRENGTH OF UPPER EXTREMITY: ICD-10-CM

## 2025-05-23 DIAGNOSIS — R29.898 DECREASED RANGE OF MOTION OF NECK: ICD-10-CM

## 2025-05-23 DIAGNOSIS — Z74.09 DECREASED FUNCTIONAL MOBILITY AND ENDURANCE: Primary | ICD-10-CM

## 2025-05-23 DIAGNOSIS — M54.2 NECK PAIN: ICD-10-CM

## 2025-05-23 PROCEDURE — 97140 MANUAL THERAPY 1/> REGIONS: CPT

## 2025-05-23 PROCEDURE — 97110 THERAPEUTIC EXERCISES: CPT

## 2025-05-23 PROCEDURE — 97112 NEUROMUSCULAR REEDUCATION: CPT

## 2025-05-23 NOTE — PROGRESS NOTES
Outpatient Rehab    Physical Therapy Visit    Patient Name: Izzy Horner  MRN: [id number]  YOB: 1985  Encounter Date: 5/23/2025    Therapy Diagnosis:   Encounter Diagnoses   Name Primary?    Decreased functional mobility and endurance Yes    Decreased range of motion of neck     Decreased strength of upper extremity     Neck pain      Physician: Willie Berumen NP    Physician Orders: Eval and Treat  Medical Diagnosis: Intractable chronic migraine without aura and without status migrainosus    Visit # / Visits Authorized:  1 / 25  Insurance Authorization Period: 5/16/2025 to 9/1/2026  Date of Evaluation: 5/16/2025  Plan of Care Certification: 5/16/2025 to 7/11/2025      PT/PTA:     Number of PTA visits since last PT visit:   Time In: 1100   Time Out: 1156  Total Time (in minutes): 56   Total Billable Time (in minutes): 53    FOTO:  Intake Score:  %  Survey Score 2:  %  Survey Score 3:  %    Precautions:       Subjective   Pt reports no significant changes overall. She has a 6/10 headache today..         Objective            Treatment:     CPT Intervention Performed   Today Duration / Intensity   MT Soft tissue massage  x Suboccipitals, cervical paraspinals, upper traps     Dry Needling       TE UBE   x 3 mins forward and backward      Upper trapezius stretch  x 2x30s holds b      Open books  x 15x b      Series 6 - 1/2 foam  x 1 min each     NMR Cervical Retractions  x  30x      Scapular Retractions         Shoulder Rolls         Prone lower trap isometrics  x 3 mins x 5s holds      Prone extensions  x 2x10 b      Prone rows  x 2x10 b      Prone Ts  x 2x10 b      B shoulder ER  x Red band 3x10    TA                                                 PLAN Dry needling of suboccipitals and upper traps   Pec stretch, series 6, B ER, prone scapular series      CPT Codes available for Billing:   (10) minutes of Manual therapy (MT) to improve pain and ROM.  (18) minutes of Therapeutic Exercise (TE)  to develop strength, endurance, range of motion, and flexibility.  (25) minutes of Neuromuscular Re-Education (NMR)  to improve: Balance, Coordination, Kinesthetic, Sense, Proprioception, and Posture.  (00) minutes of Therapeutic Activities (TA) to improve functional performance.  Vasopneumatic Device Therapy () for management of swelling/edema. (54988)  Unattended Electrical Stimulation (ES) for muscle performance or pain modulation.  BFR: Blood flow restriction applied during exercise          Time Entry(in minutes):       Assessment & Plan   Assessment: Patient tolerated session well today.  She notes significant reduction in headache following manual interventions. Incorporated upper trapezius stretch and open books to improve mobility.       The patient will continue to benefit from skilled outpatient physical therapy in order to address the deficits listed in the problem list on the initial evaluation, provide patient and family education, and maximize the patients level of independence in the home and community environments.     The patient's spiritual, cultural, and educational needs were considered, and the patient is agreeable to the plan of care and goals.           Plan: Continue Plan of Care (POC) and progress per patient tolerance. See treatment section for details on planned progressions next session.    Goals:   Active       Functional outcome       Patient will show a significant change in FOTO patient-reported outcome tool to  goal score to demonstrate subjective improvement       Start:  05/16/25    Expected End:  07/11/25            Patient will demonstrate independence in home program for support of progression       Start:  05/16/25    Expected End:  06/13/25               Pain       Patient will report pain of 3-4/10 demonstrating a reduction of overall pain       Start:  05/16/25    Expected End:  07/11/25            Patient will report a 2 point reduction in pain.       Start:  05/16/25     Expected End:  06/13/25               Range of Motion       Patient will achieve cervical extension ROM 70 degrees       Start:  05/16/25    Expected End:  07/11/25               Strength       Patient will achieve bilateral shoulder abduction strength of 4+/5       Start:  05/16/25    Expected End:  07/11/25                Randa Brady PT

## 2025-05-28 ENCOUNTER — TELEPHONE (OUTPATIENT)
Dept: NEUROLOGY | Facility: CLINIC | Age: 40
End: 2025-05-28
Payer: COMMERCIAL

## 2025-05-28 ENCOUNTER — CLINICAL SUPPORT (OUTPATIENT)
Dept: REHABILITATION | Facility: HOSPITAL | Age: 40
End: 2025-05-28
Payer: COMMERCIAL

## 2025-05-28 DIAGNOSIS — G44.309 POST-CONCUSSION HEADACHE: ICD-10-CM

## 2025-05-28 DIAGNOSIS — S09.90XD INJURY OF HEAD, SUBSEQUENT ENCOUNTER: ICD-10-CM

## 2025-05-28 DIAGNOSIS — R29.898 DECREASED RANGE OF MOTION OF NECK: ICD-10-CM

## 2025-05-28 DIAGNOSIS — M54.2 NECK PAIN, BILATERAL: ICD-10-CM

## 2025-05-28 DIAGNOSIS — Z74.09 DECREASED FUNCTIONAL MOBILITY AND ENDURANCE: Primary | ICD-10-CM

## 2025-05-28 DIAGNOSIS — G43.719 INTRACTABLE CHRONIC MIGRAINE WITHOUT AURA AND WITHOUT STATUS MIGRAINOSUS: Primary | ICD-10-CM

## 2025-05-28 DIAGNOSIS — R29.898 DECREASED STRENGTH OF UPPER EXTREMITY: ICD-10-CM

## 2025-05-28 DIAGNOSIS — M54.2 NECK PAIN: ICD-10-CM

## 2025-05-28 PROCEDURE — 97140 MANUAL THERAPY 1/> REGIONS: CPT

## 2025-05-28 PROCEDURE — 97110 THERAPEUTIC EXERCISES: CPT

## 2025-05-28 PROCEDURE — 97112 NEUROMUSCULAR REEDUCATION: CPT

## 2025-05-28 NOTE — TELEPHONE ENCOUNTER
----- Message from Skylar sent at 5/28/2025  3:15 PM CDT -----  Contact: Izzy Scott is calling in regards to the status of her Accommodation letter.please call pt back at .[phone] ThanksWJ

## 2025-05-28 NOTE — TELEPHONE ENCOUNTER
Spoke with patient and informed her that being her employer needs to know her lifting and bending limitations, the physical therapist will need to complete a work capacity evaluation, which will be able to determine her limits. And once this is done, Ms. Tapia can review and adjust her letter with the necessary restrictions. The patient asked if her physical therapist will write the letter and I told her no, Ms. Tapia will write the letter once the evaluation is completed. Also informed her Ms. Tapia spoke directly to her physical therapist all she'll need to do is speak with her physical therapist at her next appointment.

## 2025-05-28 NOTE — PROGRESS NOTES
Outpatient Rehab    Physical Therapy Visit    Patient Name: Izzy Horner  MRN: [id number]  YOB: 1985  Encounter Date: 5/28/2025    Therapy Diagnosis:   Encounter Diagnoses   Name Primary?    Decreased functional mobility and endurance Yes    Decreased range of motion of neck     Decreased strength of upper extremity     Neck pain      Physician: Willie Berumen NP    Physician Orders: Eval and Treat  Medical Diagnosis: Intractable chronic migraine without aura and without status migrainosus    Visit # / Visits Authorized:  2 / 25  Insurance Authorization Period: 5/16/2025 to 9/1/2026  Date of Evaluation: 5/16/2025  Plan of Care Certification: 5/16/2025 to 7/11/2025      PT/PTA:     Number of PTA visits since last PT visit:   Time In: 0837   Time Out: 0932  Total Time (in minutes): 55   Total Billable Time (in minutes):  55    FOTO:  Intake Score:  %  Survey Score 2:  %  Survey Score 3:  %    Precautions: none     Subjective   Patient reports no headaches today but pain and tightness in cervical musculature..  Pain reported as 4/10.      Objective        Treatment:     CPT Intervention Performed   Today Duration / Intensity   MT Soft tissue massage  x Suboccipitals, cervical paraspinals, upper traps     Dry Needling       TE UBE  x 3 mins forward and backward      Upper trapezius stretch   2x30s holds b      Open books  x 15x b      Series 6 - 1/2 foam  x 1 min each           NMR Cervical Retractions x 30x      Scapular Retractions         Shoulder Rolls         Prone lower trap isometrics  x 3 mins x 5s holds      Prone extensions  x 2x10 b with 4#     Prone rows  x 2x10 b with 4#      Prone Ts  x 2x10 b     Prone W's x 2x10 b     B shoulder ER   Red band 3x10          TA                                                 PLAN Dry needling of suboccipitals and upper traps   Pec stretch, series 6, B ER, prone scapular series      CPT Codes available for Billing:   (12) minutes of Manual  therapy (MT) to improve pain and ROM.  (16) minutes of Therapeutic Exercise (TE) to develop strength, endurance, range of motion, and flexibility.  (25) minutes of Neuromuscular Re-Education (NMR)  to improve: Balance, Coordination, Kinesthetic, Sense, Proprioception, and Posture.  (00) minutes of Therapeutic Activities (TA) to improve functional performance.  Vasopneumatic Device Therapy () for management of swelling/edema. (19552)  Unattended Electrical Stimulation (ES) for muscle performance or pain modulation.  BFR: Blood flow restriction applied during exercise    Assessment & Plan   Assessment: Patient tolerated session well. Pt progress with performance of prone W's for improved lower trap activation. Pt also progressed with adding resistance with prone rows and shoulder extensions with good tolerance.     The patient will continue to benefit from skilled outpatient physical therapy in order to address the deficits listed in the problem list on the initial evaluation, provide patient and family education, and maximize the patients level of independence in the home and community environments.     The patient's spiritual, cultural, and educational needs were considered, and the patient is agreeable to the plan of care and goals.      Plan: Continue Plan of Care (POC) and progress per patient tolerance. See treatment section for details on planned progressions next session.    Goals:   Active       Functional outcome       Patient will show a significant change in FOTO patient-reported outcome tool to  goal score to demonstrate subjective improvement       Start:  05/16/25    Expected End:  07/11/25            Patient will demonstrate independence in home program for support of progression       Start:  05/16/25    Expected End:  06/13/25               Pain       Patient will report pain of 3-4/10 demonstrating a reduction of overall pain       Start:  05/16/25    Expected End:  07/11/25            Patient  will report a 2 point reduction in pain.       Start:  05/16/25    Expected End:  06/13/25               Range of Motion       Patient will achieve cervical extension ROM 70 degrees       Start:  05/16/25    Expected End:  07/11/25               Strength       Patient will achieve bilateral shoulder abduction strength of 4+/5       Start:  05/16/25    Expected End:  07/11/25                Christophe Paniagua, PT

## 2025-05-29 ENCOUNTER — CLINICAL SUPPORT (OUTPATIENT)
Dept: REHABILITATION | Facility: HOSPITAL | Age: 40
End: 2025-05-29
Payer: COMMERCIAL

## 2025-05-29 DIAGNOSIS — R29.898 DECREASED STRENGTH OF UPPER EXTREMITY: ICD-10-CM

## 2025-05-29 DIAGNOSIS — M54.2 NECK PAIN: ICD-10-CM

## 2025-05-29 DIAGNOSIS — R29.898 DECREASED RANGE OF MOTION OF NECK: ICD-10-CM

## 2025-05-29 DIAGNOSIS — Z74.09 DECREASED FUNCTIONAL MOBILITY AND ENDURANCE: Primary | ICD-10-CM

## 2025-05-29 PROCEDURE — 97110 THERAPEUTIC EXERCISES: CPT

## 2025-05-29 PROCEDURE — 97112 NEUROMUSCULAR REEDUCATION: CPT

## 2025-05-29 PROCEDURE — 97140 MANUAL THERAPY 1/> REGIONS: CPT

## 2025-05-29 NOTE — PROGRESS NOTES
Outpatient Rehab    Physical Therapy Visit    Patient Name: Izzy Horner  MRN: [id number]  YOB: 1985  Encounter Date: 5/29/2025    Therapy Diagnosis:   Encounter Diagnoses   Name Primary?    Decreased functional mobility and endurance Yes    Decreased range of motion of neck     Decreased strength of upper extremity     Neck pain      Physician: Willie Berumen NP    Physician Orders: Eval and Treat  Medical Diagnosis: Intractable chronic migraine without aura and without status migrainosus    Visit # / Visits Authorized:  3 / 25  Insurance Authorization Period: 5/16/2025 to 9/1/2026  Date of Evaluation: 5/16/2025  Plan of Care Certification: 5/16/2025 to 7/11/2025      PT/PTA:     Number of PTA visits since last PT visit:   Time In: 1001   Time Out: 1058  Total Time (in minutes): 57   Total Billable Time (in minutes):  55    FOTO:  Intake Score:  %  Survey Score 2:  %  Survey Score 3:  %    Precautions: none     Subjective   Patient reports feeling sore from session yesterday. Attributes it to the prone exercises performed yesterday and compensating using cervical and upper trap muscles..  Pain reported as 5/10. Cervical    Objective        Treatment:     CPT Intervention Performed   Today Duration / Intensity   MT Soft tissue massage  x Suboccipitals, cervical paraspinals, upper traps     Dry Needling x Rectus Capitis   TE UBE  x 3 mins forward and backward      Upper trapezius stretch   2x30s holds b      Open books  x 15x b      Series 6 - 1/2 foam  x 1 min each, 2 min pec stretch          NMR Cervical Retractions x 30x      Scapular Retractions         Shoulder Rolls         Prone lower trap isometrics  x 3 mins x 5s holds      Prone extensions   2x10 b with 4#     Prone rows   2x10 b with 4#      Prone Ts   2x10 b     Prone W's  2x10 b     B shoulder ER   Red band 3x10          TA                                                 PLAN Dry needling of suboccipitals and upper traps   Pec  stretch, series 6, B ER, prone scapular series      CPT Codes available for Billing:   (25) minutes of Manual therapy (MT) to improve pain and ROM.  (18) minutes of Therapeutic Exercise (TE) to develop strength, endurance, range of motion, and flexibility.  (10) minutes of Neuromuscular Re-Education (NMR)  to improve: Balance, Coordination, Kinesthetic, Sense, Proprioception, and Posture.  (00) minutes of Therapeutic Activities (TA) to improve functional performance.  Vasopneumatic Device Therapy () for management of swelling/edema. (78327)  Unattended Electrical Stimulation (ES) for muscle performance or pain modulation.  BFR: Blood flow restriction applied during exercise    Assessment & Plan   Assessment: Patient tolerated session well. Dry needling performed today with good response in cervical suboccipitals. Mobility exercises performed following to promote blood flow and reduce soreness.     The patient will continue to benefit from skilled outpatient physical therapy in order to address the deficits listed in the problem list on the initial evaluation, provide patient and family education, and maximize the patients level of independence in the home and community environments.     The patient's spiritual, cultural, and educational needs were considered, and the patient is agreeable to the plan of care and goals.      Plan: Continue Plan of Care (POC) and progress per patient tolerance. See treatment section for details on planned progressions next session.    Goals:   Active       Functional outcome       Patient will show a significant change in FOTO patient-reported outcome tool to  goal score to demonstrate subjective improvement       Start:  05/16/25    Expected End:  07/11/25            Patient will demonstrate independence in home program for support of progression       Start:  05/16/25    Expected End:  06/13/25               Pain       Patient will report pain of 3-4/10 demonstrating a reduction  of overall pain       Start:  05/16/25    Expected End:  07/11/25            Patient will report a 2 point reduction in pain.       Start:  05/16/25    Expected End:  06/13/25               Range of Motion       Patient will achieve cervical extension ROM 70 degrees       Start:  05/16/25    Expected End:  07/11/25               Strength       Patient will achieve bilateral shoulder abduction strength of 4+/5       Start:  05/16/25    Expected End:  07/11/25                Christophe Paniagua, PT

## 2025-06-03 ENCOUNTER — CLINICAL SUPPORT (OUTPATIENT)
Dept: REHABILITATION | Facility: HOSPITAL | Age: 40
End: 2025-06-03
Payer: COMMERCIAL

## 2025-06-03 DIAGNOSIS — R29.898 DECREASED RANGE OF MOTION OF NECK: ICD-10-CM

## 2025-06-03 DIAGNOSIS — M54.2 NECK PAIN: ICD-10-CM

## 2025-06-03 DIAGNOSIS — R29.898 DECREASED STRENGTH OF UPPER EXTREMITY: ICD-10-CM

## 2025-06-03 DIAGNOSIS — Z74.09 DECREASED FUNCTIONAL MOBILITY AND ENDURANCE: Primary | ICD-10-CM

## 2025-06-03 PROCEDURE — 97530 THERAPEUTIC ACTIVITIES: CPT

## 2025-06-03 PROCEDURE — 97112 NEUROMUSCULAR REEDUCATION: CPT

## 2025-06-03 PROCEDURE — 97110 THERAPEUTIC EXERCISES: CPT

## 2025-06-03 PROCEDURE — 97140 MANUAL THERAPY 1/> REGIONS: CPT

## 2025-06-10 ENCOUNTER — CLINICAL SUPPORT (OUTPATIENT)
Dept: REHABILITATION | Facility: HOSPITAL | Age: 40
End: 2025-06-10
Payer: COMMERCIAL

## 2025-06-10 DIAGNOSIS — Z74.09 DECREASED FUNCTIONAL MOBILITY AND ENDURANCE: Primary | ICD-10-CM

## 2025-06-10 DIAGNOSIS — R29.898 DECREASED RANGE OF MOTION OF NECK: ICD-10-CM

## 2025-06-10 DIAGNOSIS — M54.2 NECK PAIN: ICD-10-CM

## 2025-06-10 DIAGNOSIS — R29.898 DECREASED STRENGTH OF UPPER EXTREMITY: ICD-10-CM

## 2025-06-10 PROCEDURE — 97110 THERAPEUTIC EXERCISES: CPT

## 2025-06-10 PROCEDURE — 97140 MANUAL THERAPY 1/> REGIONS: CPT

## 2025-06-10 PROCEDURE — 97112 NEUROMUSCULAR REEDUCATION: CPT

## 2025-06-10 PROCEDURE — 97530 THERAPEUTIC ACTIVITIES: CPT

## 2025-06-10 NOTE — PROGRESS NOTES
"New Patient Interventional Pain Note (Initial Visit)    Referring Physician: Willie Berumen NP    PCP: Valentine Payne DO    Chief Complaint:   Chief Complaint   Patient presents with    Neck Pain        SUBJECTIVE:    Izzy Horner is a 39 y.o. female with past medical history significant for  who presents to the clinic for the evaluation of neck, scalp shoulder and arm pain.  Patient reports pain began approximately 1 year prior following a mechanical injury.  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  Prior to this time patient reports no neck pain or associated headaches.  Since this time patient has had severe pain in bilateral cervical paraspinous musculature which can radiate to the occiput as well as down bilateral shoulders and the upper extremities to the hands and predominantly C7-8 dermatomal distribution.  Pain is worse on the right than on the left.  Pain is intermittent and today is rated an 8/10.  Pain is described as aching and throbbing in the head and neck and tingling and numbness in the upper extremities.  Pain can be exacerbated with cervical flexion and extension and lateral rotation.  Patient reports grinding and popping sensation in the neck.  She struggles with day-to-day activities involving overhead activities or with pushing and pulling motions.  Patient has been temporarily requested to leave from work until a formal evaluation regarding movements with weights and duration has been formally evaluated and filled out by her provider.  Therefore pain is significantly impacting activities of daily living, career in quality of life.  She reports difficulty with even carrying 1 gal of water.  She does endorse weakness in the upper extremities as well as compromise in hand  strength and dexterity.  Pain is " marginally improved with ibuprofen 800 mg as well as physical therapy.    Patient has been avidly performing conventional land-based physical therapy for cervical neck pain and migraine headaches and has completed from 11/15/2024 through 02/17/2025, 12 sessions as well as has re-initiated from 05/16/2025 through current date 06/12/2025 and has completed 6 sessions total in his continuing twice weekly sessions.  She does report significant improvement with cervical traction exercises.    She also suffers from several headaches which occur weekly.  She reports a proximally 4 headaches per week exceeding 15 headaches per month.  Headache location can vary but predominantly in the frontotemporal retro-orbital area.  She has associated photophobia as well as nausea but denies vomiting, diplopia or neck stiffness.      Patient has been started on Topamax 25 mg by Neurology, Ms. Berumen with no improvement in her symptoms.    Patient has not received prior interventional treatment for neck pain or cervical radiculopathy.    Patient reports significant motor weakness and loss of sensations.  Patient denies night fever/night sweats, urinary incontinence, bowel incontinence, and significant weight loss.      Pain Disability Index Review:         6/11/2025    10:13 AM   Last 3 PDI Scores   Pain Disability Index (PDI) 56       Non-Pharmacologic Treatments:  Physical Therapy/Home Exercise: yes  Ice/Heat:yes  TENS: no  Acupuncture: no  Massage: yes  Chiropractic: no    Other: no      Pain Medications:  - Adjuvant Medications: Advil,Motrin ( Ibuprofen) and Topamax (Topiramate), Maxalt    Pain Procedures:   None    Past Medical History:   Diagnosis Date    Anemia     Encounter for blood transfusion      History reviewed. No pertinent surgical history.  Review of patient's allergies indicates:  No Known Allergies    Current Outpatient Medications   Medication Sig    ferrous sulfate 325 (65 FE) MG EC tablet Take 1 tablet (325 mg  "total) by mouth once daily.    ibuprofen (ADVIL,MOTRIN) 800 MG tablet Take 1 tablet (800 mg total) by mouth every 8 (eight) hours as needed for Pain (Migraine).    rizatriptan (MAXALT) 10 MG tablet Take 1 tablet (10 mg total) by mouth daily as needed for Migraine. If symptoms persist or return, may repeat dose after 2 hours. Do not exceed more than 2 tablets in 24hr period    topiramate (TOPAMAX) 50 MG tablet Take 1 tablet (50 mg total) by mouth 2 (two) times daily for 15 days, THEN 2 tablets (100 mg total) 2 (two) times daily for 15 days     No current facility-administered medications for this visit.             Review of Systems:  GENERAL:  No weight loss, malaise or fevers.  HEENT:   No recent changes in vision or hearing  NECK:  Negative for lumps, no difficulty with swallowing.  RESPIRATORY:  Negative for cough, wheezing or shortness of breath, patient denies any recent URI.  CARDIOVASCULAR:  Negative for chest pain or palpitations.  GI:  Negative for abdominal discomfort, blood in stools or black stools or change in bowel habits.  MUSCULOSKELETAL:  See HPI.  SKIN:  Negative for lesions, rash, and itching.  PSYCH:  No mood disorder or recent psychosocial stressors.   HEMATOLOGY/LYMPHOLOGY:  Negative for prolonged bleeding, bruising easily or swollen nodes.    NEURO:   No history of syncope, paralysis, seizures or tremors.  All other reviewed and negative other than HPI.        OBJECTIVE:    Physical Exam:  BP (!) 139/98 (BP Location: Right arm)   Pulse 73   Ht 5' 3" (1.6 m)   Wt 77.8 kg (171 lb 8.3 oz)   BMI 30.38 kg/m²     GENERAL: Well appearing, in no acute distress, alert and oriented x3.  PSYCH:  Mood and affect appropriate.  SKIN: Skin color, texture, turgor normal, no rashes or lesions.  HEAD/FACE:  Normocephalic, atraumatic. Cranial nerves grossly intact.    NECK:  pain to palpation over the cervical paraspinous muscles, bilaterally and in greater and lesser occipital distributions. Spurling " positive on right.  pain with neck flexion, extension, or lateral flexion.   Normaltesting biceps, triceps and brachioradialis bilaterally.    NegativeHoffmann's bilaterally.    5/5 strength testing deltoid, biceps, triceps, wrist extensor, wrist flexor and ulnar intrinsics bilaterally.    Normal  strength bilaterally    CV: RRR with palpation of the radial artery.  PULM: No evidence of respiratory difficulty, symmetric chest rise.  GI:  Soft and non-tender.    NEURO: Bilateral upper and lower extremity coordination and muscle stretch reflexes are physiologic and symmetric. No loss of sensation is noted.  GAIT: normal.        Imagin/19/24    MRI Cervical Spine Without Contrast    Narrative  EXAMINATION:  MRI CERVICAL SPINE WITHOUT CONTRAST    CLINICAL HISTORY:  Neck trauma, focal neuro deficit or paresthesia (Age 16-64y); Unspecified injury of head, subsequent encounter    TECHNIQUE:  Multiplanar, multisequence MR images of the cervical spine were performed without the administration of contrast.    COMPARISON:  None.    FINDINGS:  Alignment: Normal.    Vertebrae: Normal marrow signal. No fracture.    Discs: Normal height and signal.    Cord: Normal.    Skull base and craniocervical junction: Normal.    Degenerative findings:    C2-C3: No significant neural foraminal narrowing or spinal canal stenosis.    C3-C4: No significant neural foraminal narrowing or spinal canal stenosis.    C4-C5: No significant neural foraminal narrowing or spinal canal stenosis.    C5-C6: Broad-based posterior disc bulge mildly indenting the ventral thecal sac.  Right uncovertebral hypertrophy contributing to mild right neural foraminal narrowing.  No significant spinal canal stenosis.    C6-C7: No significant neural foraminal narrowing or spinal canal stenosis.    C7-T1: No significant neural foraminal narrowing or spinal canal stenosis.    Paraspinal muscles & soft tissues: Unremarkable.    Impression  C5-6 disc bulge and  mild right neural foraminal narrowing.                ASSESSMENT: 39 y.o. year old female with     1. DDD (degenerative disc disease), cervical  Ambulatory Referral/Consult to Physical Therapy      2. Neck pain, bilateral  Ambulatory referral/consult to Pain Clinic      3. Cervical radiculopathy  Ambulatory Referral/Consult to Physical Therapy    Case Request-RAD/Other Procedure Area: C5/6 IL PAO with right paramedian approach      4. Facet arthropathy, cervical  Ambulatory Referral/Consult to Physical Therapy      5. Intractable chronic migraine without aura and without status migrainosus  topiramate (TOPAMAX) 50 MG tablet            PLAN:   - Interventions:  Schedule for C5-6 interlaminar epidural steroid injection with right paramedian approach to see if this helps with cervical radiculopathy. Explained the risks and benefits of the procedure in detail with the patient today in clinic along with alternative treatment options, and the patient elected to pursue the intervention at this time.      - Anticoagulation use: No no anticoagulation     report:  Reviewed and consistent with medication use as prescribed.    - Medications:  -We have discussed starting the patient on a Topamax titration.  We discussed potential side effects of this medication include drowsiness, dizziness, tingling of the hands and feet, diarrhea, dysgeusia, weight loss/anorexia.  Patient has been given the following algorithm to follow    Topamax Titration  Step 1: 50mg BID for a week  Step 2+: 100 mg BID     - Therapy:   We discussed continuing conventional land-based physical therapy to help manage the patient/s painful condition. The patient was counseled that muscle strengthening will improve the long term prognosis in regards to pain and may also help increase range of motion and mobility. Patient has been avidly performing conventional land-based physical therapy for cervical neck pain and migraine headaches and has completed from  11/15/2024 through 02/17/2025, 12 sessions as well as has re-initiated from 05/16/2025 through current date 06/12/2025 and has completed 6 sessions total in his continuing twice weekly sessions.      - Imaging: Reviewed available imaging(cervical MRI) with patient and answered any questions they had regarding study.    - Consults/Referrals:(PRN)  -Neurology: Ms. Berumen, intractable chronic migraine, postconcussion headache    - Follow up visit: return to clinic in 4-6 weeks status post injection.  Virtual visit with Balbina Newell      The above plan and management options were discussed at length with patient. Patient is in agreement with the above and verbalized understanding.    - I discussed the goals of interventional chronic pain management with the patient on today's visit. We discussed a multimodal and systematic approach to pain.  This includes diagnostic and therapeutic injections, adjuvant pharmacologic treatment, physical therapy, and at times psychiatry.  I emphasized the importance of regular exercise, core strengthening and stretching, diet and weight loss as a cornerstone of long-term pain management.    - This condition does not require this patient to take time off of work, and the primary goal of our Pain Management services is to improve the patient's functional capacity.  - Patient Questions: Answered all of the patient's questions regarding diagnoses, therapy, treatment and next steps        Allyn Sofia MD  Interventional Pain Management  [facility]    Disclaimer:  This note was prepared using voice recognition system and is likely to have sound alike errors that may have been overlooked even after proof reading.  Please call me with any questions     0

## 2025-06-10 NOTE — PROGRESS NOTES
"  Outpatient Rehab    Physical Therapy Visit    Patient Name: Izzy Horner  MRN: [id number]  YOB: 1985  Encounter Date: 6/10/2025    Therapy Diagnosis:   Encounter Diagnoses   Name Primary?    Decreased functional mobility and endurance Yes    Decreased range of motion of neck     Decreased strength of upper extremity     Neck pain      Physician: Willie Berumen NP    Physician Orders: Eval and Treat  Medical Diagnosis: Intractable chronic migraine without aura and without status migrainosus  Visit # / Visits Authorized:  5 / 16  Insurance Authorization Period: 5/23/2025 to 7/11/2025  Date of Evaluation: 5/16/2025  Plan of Care Certification: 5/16/2025 to 7/11/2025      PT/PTA:     Number of PTA visits since last PT visit:   Time In: 1000   Time Out: 1102  Total Time (in minutes): 62   Total Billable Time (in minutes):  60    FOTO:  Intake Score:  %  Survey Score 2:  %  Survey Score 3:  %    Precautions: none     Subjective   Patient reports she is feeling great today. Notes she did have bad headache over the weekend..  Pain reported as 5/10. Cervical    Objective        Treatment:     CPT Intervention Performed   Today Duration / Intensity   MT Soft tissue massage  x Suboccipitals, cervical paraspinals, upper traps    Joint Mobilization x Cervical traction     Dry Needling x Bilateral upper traps         TE UBE  x 3 mins forward and backward      Upper trapezius stretch  x 2x30s holds b      Open books  x 15x b      Series 6 - 1/2 foam  x 1 min each, 2 min pec stretch          NMR Cervical Retractions x 30x with 5" holds     Scapular Retractions x X30 with 5" holds      Shoulder Rolls x X30 each way     Prone lower trap isometrics  x 3 mins x 5s holds      Prone extensions   2x10 b with 4#     Prone rows   2x10 b with 4#      Prone Ts  x 2x10 b     Prone W's x 2x10 b     B shoulder ER  x Red band 3x10          TA Rows x 3 x 10 5# cables    Lat Pull Downs x 3 x 10 with 5# cables             "            PLAN Dry needling of suboccipitals and upper traps   Pec stretch, series 6, B ER, prone scapular series     CPT Codes available for Billing:   (12) minutes of Manual therapy (MT) to improve pain and ROM.  (15) minutes of Therapeutic Exercise (TE) to develop strength, endurance, range of motion, and flexibility.  (24) minutes of Neuromuscular Re-Education (NMR)  to improve: Balance, Coordination, Kinesthetic, Sense, Proprioception, and Posture.  (09) minutes of Therapeutic Activities (TA) to improve functional performance.  Vasopneumatic Device Therapy () for management of swelling/edema. (01283)  Unattended Electrical Stimulation (ES) for muscle performance or pain modulation.  BFR: Blood flow restriction applied during exercise    Assessment & Plan   Assessment: Patient tolerated session well. Dry needling performed today to bilateral upper traps with good twitch response of involved trigger points. Pt educated on performance of HEP and mobility exercises today in order to support benefits of dry needling.     The patient will continue to benefit from skilled outpatient physical therapy in order to address the deficits listed in the problem list on the initial evaluation, provide patient and family education, and maximize the patients level of independence in the home and community environments.     The patient's spiritual, cultural, and educational needs were considered, and the patient is agreeable to the plan of care and goals.      Plan: Continue Plan of Care (POC) and progress per patient tolerance. See treatment section for details on planned progressions next session.    Goals:   Active       Functional outcome       Patient will show a significant change in FOTO patient-reported outcome tool to  goal score to demonstrate subjective improvement       Start:  05/16/25    Expected End:  07/11/25            Patient will demonstrate independence in home program for support of progression        Start:  05/16/25    Expected End:  06/13/25               Pain       Patient will report pain of 3-4/10 demonstrating a reduction of overall pain       Start:  05/16/25    Expected End:  07/11/25            Patient will report a 2 point reduction in pain.       Start:  05/16/25    Expected End:  06/13/25               Range of Motion       Patient will achieve cervical extension ROM 70 degrees       Start:  05/16/25    Expected End:  07/11/25               Strength       Patient will achieve bilateral shoulder abduction strength of 4+/5       Start:  05/16/25    Expected End:  07/11/25                Christophe Paniagua, PT

## 2025-06-11 ENCOUNTER — OFFICE VISIT (OUTPATIENT)
Dept: PAIN MEDICINE | Facility: CLINIC | Age: 40
End: 2025-06-11
Payer: COMMERCIAL

## 2025-06-11 VITALS
HEIGHT: 63 IN | SYSTOLIC BLOOD PRESSURE: 139 MMHG | WEIGHT: 171.5 LBS | DIASTOLIC BLOOD PRESSURE: 98 MMHG | BODY MASS INDEX: 30.39 KG/M2 | HEART RATE: 73 BPM

## 2025-06-11 DIAGNOSIS — M47.812 FACET ARTHROPATHY, CERVICAL: ICD-10-CM

## 2025-06-11 DIAGNOSIS — M54.12 CERVICAL RADICULOPATHY: ICD-10-CM

## 2025-06-11 DIAGNOSIS — M54.2 NECK PAIN, BILATERAL: ICD-10-CM

## 2025-06-11 DIAGNOSIS — M50.30 DDD (DEGENERATIVE DISC DISEASE), CERVICAL: Primary | ICD-10-CM

## 2025-06-11 DIAGNOSIS — G43.719 INTRACTABLE CHRONIC MIGRAINE WITHOUT AURA AND WITHOUT STATUS MIGRAINOSUS: ICD-10-CM

## 2025-06-11 PROCEDURE — 3008F BODY MASS INDEX DOCD: CPT | Mod: CPTII,S$GLB,, | Performed by: ANESTHESIOLOGY

## 2025-06-11 PROCEDURE — 1159F MED LIST DOCD IN RCRD: CPT | Mod: CPTII,S$GLB,, | Performed by: ANESTHESIOLOGY

## 2025-06-11 PROCEDURE — 3080F DIAST BP >= 90 MM HG: CPT | Mod: CPTII,S$GLB,, | Performed by: ANESTHESIOLOGY

## 2025-06-11 PROCEDURE — 99204 OFFICE O/P NEW MOD 45 MIN: CPT | Mod: S$GLB,,, | Performed by: ANESTHESIOLOGY

## 2025-06-11 PROCEDURE — 99999 PR PBB SHADOW E&M-EST. PATIENT-LVL IV: CPT | Mod: PBBFAC,,, | Performed by: ANESTHESIOLOGY

## 2025-06-11 PROCEDURE — 1160F RVW MEDS BY RX/DR IN RCRD: CPT | Mod: CPTII,S$GLB,, | Performed by: ANESTHESIOLOGY

## 2025-06-11 PROCEDURE — 3075F SYST BP GE 130 - 139MM HG: CPT | Mod: CPTII,S$GLB,, | Performed by: ANESTHESIOLOGY

## 2025-06-11 RX ORDER — TOPIRAMATE 50 MG/1
TABLET, FILM COATED ORAL
Qty: 90 TABLET | Refills: 0 | Status: SHIPPED | OUTPATIENT
Start: 2025-06-11 | End: 2025-07-12

## 2025-06-19 ENCOUNTER — PATIENT MESSAGE (OUTPATIENT)
Dept: INTERNAL MEDICINE | Facility: CLINIC | Age: 40
End: 2025-06-19
Payer: COMMERCIAL

## 2025-06-19 ENCOUNTER — PATIENT MESSAGE (OUTPATIENT)
Dept: NEUROLOGY | Facility: CLINIC | Age: 40
End: 2025-06-19
Payer: COMMERCIAL

## 2025-06-23 ENCOUNTER — PATIENT MESSAGE (OUTPATIENT)
Dept: REHABILITATION | Facility: HOSPITAL | Age: 40
End: 2025-06-23
Payer: COMMERCIAL

## 2025-07-08 ENCOUNTER — PATIENT MESSAGE (OUTPATIENT)
Dept: INTERNAL MEDICINE | Facility: CLINIC | Age: 40
End: 2025-07-08
Payer: COMMERCIAL

## 2025-08-01 ENCOUNTER — TELEPHONE (OUTPATIENT)
Dept: NEUROLOGY | Facility: CLINIC | Age: 40
End: 2025-08-01
Payer: COMMERCIAL

## 2025-08-06 ENCOUNTER — TELEPHONE (OUTPATIENT)
Dept: PAIN MEDICINE | Facility: CLINIC | Age: 40
End: 2025-08-06
Payer: COMMERCIAL

## 2025-08-06 NOTE — TELEPHONE ENCOUNTER
Attempt to reach patient to schedule procedure  The patient did not answer. Left voice message on patients voice box to call back at earliest convenience to schedule apt.     Jeremias Larios  Medical Assistant